# Patient Record
Sex: MALE | Race: WHITE | NOT HISPANIC OR LATINO | Employment: OTHER | URBAN - METROPOLITAN AREA
[De-identification: names, ages, dates, MRNs, and addresses within clinical notes are randomized per-mention and may not be internally consistent; named-entity substitution may affect disease eponyms.]

---

## 2017-11-21 ENCOUNTER — OFFICE VISIT (OUTPATIENT)
Dept: FAMILY MEDICINE CLINIC | Facility: CLINIC | Age: 67
End: 2017-11-21

## 2017-11-21 VITALS
WEIGHT: 201.6 LBS | DIASTOLIC BLOOD PRESSURE: 68 MMHG | HEART RATE: 59 BPM | BODY MASS INDEX: 31.64 KG/M2 | OXYGEN SATURATION: 99 % | SYSTOLIC BLOOD PRESSURE: 110 MMHG | HEIGHT: 67 IN

## 2017-11-21 DIAGNOSIS — Z85.46 HISTORY OF PROSTATE CANCER: ICD-10-CM

## 2017-11-21 DIAGNOSIS — Z80.8 FAMILY HISTORY OF THYROID CANCER: ICD-10-CM

## 2017-11-21 DIAGNOSIS — I49.5 SICK SINUS SYNDROME (HCC): Primary | ICD-10-CM

## 2017-11-21 DIAGNOSIS — E78.00 ELEVATED CHOLESTEROL: ICD-10-CM

## 2017-11-21 DIAGNOSIS — Z95.0 PACEMAKER: ICD-10-CM

## 2017-11-21 DIAGNOSIS — Z90.79 H/O PROSTATECTOMY: ICD-10-CM

## 2017-11-21 DIAGNOSIS — D17.9 MULTIPLE LIPOMAS: ICD-10-CM

## 2017-11-21 PROCEDURE — 99204 OFFICE O/P NEW MOD 45 MIN: CPT | Performed by: FAMILY MEDICINE

## 2017-11-21 NOTE — PROGRESS NOTES
Subjective   Jeb Russo is a 67 y.o. male    HPI Comments: New patient presents to establish care.  He has not had a primary care physician in [facility].  He has a home here and also home in [place] and travels frequently to his home in [place].  He is retired.  Medical history includes sick sinus syndrome with pacemaker and previous prostatectomy for prostate cancer.  He has also had knee surgery for ACL repair.  He would like to have blood work done as a general follow-up.  His cardiologist is Dr. Saini. Complains of multiple nodules under skin, not tender.      The following portions of the patient's history were reviewed and updated as appropriate: allergies, current medications, past social history and problem list    Review of Systems   Constitutional: Negative.    HENT: Negative.    Eyes: Negative.    Respiratory: Negative.    Cardiovascular: Negative.    Gastrointestinal: Negative.    Endocrine: Negative.    Genitourinary: Negative.    Musculoskeletal: Negative.    Skin: Negative.    Allergic/Immunologic: Negative.    Neurological: Negative.    Hematological: Negative.    Psychiatric/Behavioral: Negative.    All other systems reviewed and are negative.      Objective     Vitals:    11/21/17 1301   BP: 110/68   Pulse: 59   SpO2: 99%       Physical Exam   Constitutional: He is oriented to person, place, and time. He appears well-developed and well-nourished.   HENT:   Head: Normocephalic and atraumatic.   Right Ear: External ear normal.   Left Ear: External ear normal.   Nose: Nose normal.   Mouth/Throat: Oropharynx is clear and moist.   Eyes: Conjunctivae and EOM are normal. Pupils are equal, round, and reactive to light.   Neck: Normal range of motion. Neck supple. No thyromegaly present.   Cardiovascular: Normal rate, regular rhythm, normal heart sounds and intact distal pulses.    No murmur heard.  Pulmonary/Chest: Effort normal and breath sounds normal.   Abdominal: Soft. Bowel sounds are  normal. He exhibits no mass. There is no tenderness.   Musculoskeletal: Normal range of motion. He exhibits no edema.   Neurological: He is alert and oriented to person, place, and time. He has normal reflexes. No cranial nerve deficit.   Skin: Skin is warm and dry.   Multiple lipomas on upper extremities and trunk   Psychiatric: He has a normal mood and affect. His behavior is normal.       Assessment/Plan   Problem List Items Addressed This Visit        Cardiovascular and Mediastinum    Sick sinus syndrome - Primary    Relevant Orders    CBC (No Diff)    Comprehensive Metabolic Panel    Pacemaker      Other Visit Diagnoses     History of prostate cancer        Relevant Orders    PSA    H/O prostatectomy        Relevant Orders    PSA    Elevated cholesterol        Relevant Orders    Lipid Panel    Family history of thyroid cancer        Relevant Orders    TSH    T4, Free    Multiple lipomas        Relevant Orders    Ambulatory Referral to General Surgery (Completed)

## 2017-11-22 ENCOUNTER — APPOINTMENT (OUTPATIENT)
Dept: LAB | Facility: HOSPITAL | Age: 67
End: 2017-11-22

## 2017-11-22 LAB
ALBUMIN SERPL-MCNC: 4.1 G/DL (ref 3.2–4.8)
ALBUMIN/GLOB SERPL: 1.6 G/DL (ref 1.5–2.5)
ALP SERPL-CCNC: 65 U/L (ref 25–100)
ALT SERPL W P-5'-P-CCNC: 43 U/L (ref 7–40)
ANION GAP SERPL CALCULATED.3IONS-SCNC: 3 MMOL/L (ref 3–11)
ARTICHOKE IGE QN: 160 MG/DL (ref 0–130)
AST SERPL-CCNC: 32 U/L (ref 0–33)
BILIRUB SERPL-MCNC: 0.7 MG/DL (ref 0.3–1.2)
BUN BLD-MCNC: 18 MG/DL (ref 9–23)
BUN/CREAT SERPL: 18 (ref 7–25)
CALCIUM SPEC-SCNC: 9.6 MG/DL (ref 8.7–10.4)
CHLORIDE SERPL-SCNC: 106 MMOL/L (ref 99–109)
CHOLEST SERPL-MCNC: 206 MG/DL (ref 0–200)
CO2 SERPL-SCNC: 28 MMOL/L (ref 20–31)
CREAT BLD-MCNC: 1 MG/DL (ref 0.6–1.3)
DEPRECATED RDW RBC AUTO: 43.6 FL (ref 37–54)
ERYTHROCYTE [DISTWIDTH] IN BLOOD BY AUTOMATED COUNT: 13.7 % (ref 11.3–14.5)
GFR SERPL CREATININE-BSD FRML MDRD: 74 ML/MIN/1.73
GLOBULIN UR ELPH-MCNC: 2.6 GM/DL
GLUCOSE BLD-MCNC: 104 MG/DL (ref 70–100)
HCT VFR BLD AUTO: 52.9 % (ref 38.9–50.9)
HDLC SERPL-MCNC: 43 MG/DL (ref 40–60)
HGB BLD-MCNC: 17 G/DL (ref 13.1–17.5)
MCH RBC QN AUTO: 28.1 PG (ref 27–31)
MCHC RBC AUTO-ENTMCNC: 32.1 G/DL (ref 32–36)
MCV RBC AUTO: 87.3 FL (ref 80–99)
PLATELET # BLD AUTO: 200 10*3/MM3 (ref 150–450)
PMV BLD AUTO: 10.1 FL (ref 6–12)
POTASSIUM BLD-SCNC: 4.5 MMOL/L (ref 3.5–5.5)
PROT SERPL-MCNC: 6.7 G/DL (ref 5.7–8.2)
PSA SERPL-MCNC: 0.1 NG/ML (ref 0–4)
RBC # BLD AUTO: 6.06 10*6/MM3 (ref 4.2–5.76)
SODIUM BLD-SCNC: 137 MMOL/L (ref 132–146)
T4 FREE SERPL-MCNC: 1.36 NG/DL (ref 0.89–1.76)
TRIGL SERPL-MCNC: 119 MG/DL (ref 0–150)
TSH SERPL DL<=0.05 MIU/L-ACNC: 1.01 MIU/ML (ref 0.35–5.35)
WBC NRBC COR # BLD: 6.18 10*3/MM3 (ref 3.5–10.8)

## 2017-11-22 PROCEDURE — 80053 COMPREHEN METABOLIC PANEL: CPT | Performed by: FAMILY MEDICINE

## 2017-11-22 PROCEDURE — 85027 COMPLETE CBC AUTOMATED: CPT | Performed by: FAMILY MEDICINE

## 2017-11-22 PROCEDURE — 80061 LIPID PANEL: CPT | Performed by: FAMILY MEDICINE

## 2017-11-22 PROCEDURE — 36415 COLL VENOUS BLD VENIPUNCTURE: CPT | Performed by: FAMILY MEDICINE

## 2017-11-22 PROCEDURE — 84443 ASSAY THYROID STIM HORMONE: CPT | Performed by: FAMILY MEDICINE

## 2017-11-22 PROCEDURE — 84439 ASSAY OF FREE THYROXINE: CPT | Performed by: FAMILY MEDICINE

## 2017-11-22 PROCEDURE — 84153 ASSAY OF PSA TOTAL: CPT | Performed by: FAMILY MEDICINE

## 2017-11-29 ENCOUNTER — TELEPHONE (OUTPATIENT)
Dept: FAMILY MEDICINE CLINIC | Facility: CLINIC | Age: 67
End: 2017-11-29

## 2017-11-29 NOTE — TELEPHONE ENCOUNTER
----- Message from Janis Bynum sent at 11/29/2017 10:25 AM EST -----  Contact: PATIENT  He would like for someone to call him with lab results from last week if they are back. Thank you    [phone]

## 2017-11-30 ENCOUNTER — TELEPHONE (OUTPATIENT)
Dept: FAMILY MEDICINE CLINIC | Facility: CLINIC | Age: 67
End: 2017-11-30

## 2017-11-30 NOTE — TELEPHONE ENCOUNTER
----- Message from Chani Seaman sent at 11/30/2017 12:02 PM EST -----  Contact: patient  Patient is wondering about the status of lab results. A good call back number is [phone].

## 2017-12-01 ENCOUNTER — OFFICE VISIT (OUTPATIENT)
Dept: CARDIOLOGY | Facility: CLINIC | Age: 67
End: 2017-12-01

## 2017-12-01 VITALS
SYSTOLIC BLOOD PRESSURE: 110 MMHG | HEART RATE: 78 BPM | BODY MASS INDEX: 30.1 KG/M2 | HEIGHT: 69 IN | WEIGHT: 203.2 LBS | DIASTOLIC BLOOD PRESSURE: 58 MMHG | OXYGEN SATURATION: 98 %

## 2017-12-01 DIAGNOSIS — I49.5 SICK SINUS SYNDROME (HCC): Primary | ICD-10-CM

## 2017-12-01 DIAGNOSIS — Z95.0 PACEMAKER: ICD-10-CM

## 2017-12-01 PROCEDURE — 93280 PM DEVICE PROGR EVAL DUAL: CPT | Performed by: INTERNAL MEDICINE

## 2017-12-01 PROCEDURE — 99213 OFFICE O/P EST LOW 20 MIN: CPT | Performed by: INTERNAL MEDICINE

## 2017-12-01 RX ORDER — VIT C/HESPERIDIN/BIOFLAVONOIDS 500-100 MG
30 TABLET ORAL DAILY
COMMUNITY

## 2017-12-01 NOTE — PATIENT INSTRUCTIONS
"DASH Eating Plan  DASH stands for \"Dietary Approaches to Stop Hypertension.\" The DASH eating plan is a healthy eating plan that has been shown to reduce high blood pressure (hypertension). Additional health benefits may include reducing the risk of type 2 diabetes mellitus, heart disease, and stroke. The DASH eating plan may also help with weight loss.  WHAT DO I NEED TO KNOW ABOUT THE DASH EATING PLAN?  For the DASH eating plan, you will follow these general guidelines:  · Choose foods with less than 150 milligrams of sodium per serving (as listed on the food label).  · Use salt-free seasonings or herbs instead of table salt or sea salt.  · Check with your health care provider or pharmacist before using salt substitutes.  · Eat lower-sodium products. These are often labeled as \"low-sodium\" or \"no salt added.\"  · Eat fresh foods. Avoid eating a lot of canned foods.  · Eat more vegetables, fruits, and low-fat dairy products.  · Choose whole grains. Look for the word \"whole\" as the first word in the ingredient list.  · Choose fish and skinless chicken or turkey more often than red meat. Limit fish, poultry, and meat to 6 oz (170 g) each day.  · Limit sweets, desserts, sugars, and sugary drinks.  · Choose heart-healthy fats.  · Eat more home-cooked food and less restaurant, buffet, and fast food.  · Limit fried foods.  · Do not beaulieu foods. Cook foods using methods such as baking, boiling, grilling, and broiling instead.  · When eating at a restaurant, ask that your food be prepared with less salt, or no salt if possible.  WHAT FOODS CAN I EAT?  Seek help from a dietitian for individual calorie needs.  Grains  Whole grain or whole wheat bread. Brown rice. Whole grain or whole wheat pasta. Quinoa, bulgur, and whole grain cereals. Low-sodium cereals. Corn or whole wheat flour tortillas. Whole grain cornbread. Whole grain crackers. Low-sodium crackers.  Vegetables  Fresh or frozen vegetables (raw, steamed, roasted, or " grilled). Low-sodium or reduced-sodium tomato and vegetable juices. Low-sodium or reduced-sodium tomato sauce and paste. Low-sodium or reduced-sodium canned vegetables.   Fruits  All fresh, canned (in natural juice), or frozen fruits.  Meat and Other Protein Products  Ground beef (85% or leaner), grass-fed beef, or beef trimmed of fat. Skinless chicken or turkey. Ground chicken or turkey. Pork trimmed of fat. All fish and seafood. Eggs. Dried beans, peas, or lentils. Unsalted nuts and seeds. Unsalted canned beans.  Dairy  Low-fat dairy products, such as skim or 1% milk, 2% or reduced-fat cheeses, low-fat ricotta or cottage cheese, or plain low-fat yogurt. Low-sodium or reduced-sodium cheeses.  Fats and Oils  Tub margarines without trans fats. Light or reduced-fat mayonnaise and salad dressings (reduced sodium). Avocado. Safflower, olive, or canola oils. Natural peanut or almond butter.  Other  Unsalted popcorn and pretzels.  The items listed above may not be a complete list of recommended foods or beverages. Contact your dietitian for more options.  WHAT FOODS ARE NOT RECOMMENDED?  Grains  White bread. White pasta. White rice. Refined cornbread. Bagels and croissants. Crackers that contain trans fat.  Vegetables  Creamed or fried vegetables. Vegetables in a cheese sauce. Regular canned vegetables. Regular canned tomato sauce and paste. Regular tomato and vegetable juices.  Fruits  Canned fruit in light or heavy syrup. Fruit juice.  Meat and Other Protein Products  Fatty cuts of meat. Ribs, chicken wings, obrien, sausage, bologna, salami, chitterlings, fatback, hot dogs, bratwurst, and packaged luncheon meats. Salted nuts and seeds. Canned beans with salt.  Dairy  Whole or 2% milk, cream, half-and-half, and cream cheese. Whole-fat or sweetened yogurt. Full-fat cheeses or blue cheese. Nondairy creamers and whipped toppings. Processed cheese, cheese spreads, or cheese curds.  Condiments  Onion and garlic salt, seasoned  salt, table salt, and sea salt. Canned and packaged gravies. Worcestershire sauce. Tartar sauce. Barbecue sauce. Teriyaki sauce. Soy sauce, including reduced sodium. Steak sauce. Fish sauce. Oyster sauce. Cocktail sauce. Horseradish. Ketchup and mustard. Meat flavorings and tenderizers. Bouillon cubes. Hot sauce. Tabasco sauce. Marinades. Taco seasonings. Relishes.  Fats and Oils  Butter, stick margarine, lard, shortening, ghee, and obrien fat. Coconut, palm kernel, or palm oils. Regular salad dressings.  Other  Pickles and olives. Salted popcorn and pretzels.  The items listed above may not be a complete list of foods and beverages to avoid. Contact your dietitian for more information.  WHERE CAN I FIND MORE INFORMATION?  National [employer], [employer] [place]: www.nhlbi.nih.gov/health/health-topics/topics/dash/     This information is not intended to replace advice given to you by your health care provider. Make sure you discuss any questions you have with your health care provider.     Document Released: 12/06/2012 Document Revised: 04/10/2017 Document Reviewed: 10/22/2014  Elsevier Interactive Patient Education ©2017 [employer]

## 2017-12-01 NOTE — PROGRESS NOTES
Encounter Date:12/01/2017      Patient ID: Jeb Russo is a 67 y.o. male.    Chief Complaint: sinus syndrome    PROBLEM LIST:  1. Sick sinus syndrome  a. Episode of syncope 2006.   b. Subsequent extensive cardiac evaluation including negative cardiac catheterization study and Holter monitor testing.  c. [facility] dual-chamber pacemaker implant in 2006  d. PM generator change by Dr. Saini April 2012  2. Dyslipidemia, not on any treatment.  3. Prostate cancer, s/p prostatectomy  4. Obesity, BMI 30  5. Surgical history:   a. Prostatectomy   b. Knee surgery/ ACL repair (remote past)    History of Present Illness  Patient presents today for follow-up with a history of SSS. Has been doing well from a cardiac standpoint. Complains of sudden increase in heart rate when he first starts on the treadmill in the morning but gradually comes back down.  He has detected this on his feet bit and states that this does not occur if he exercises in the afternoon. Has recently arrived from [facility] for his annual visit.  Recently saw her PCP and his labs revealed significant dyslipidemia.  He is due to go back for follow-up. Denies chest pain, shortness of breath, leg swelling, palpitations, and syncope. Remains busy and active with light activity on the treadmil.    No Known Allergies      Current Outpatient Prescriptions:   •  Multiple Vitamins-Minerals (MULTIVITAMIN ADULT PO), Take 15 mg by mouth Daily., Disp: , Rfl:   •  Zinc 30 MG tablet, Take  by mouth Daily., Disp: , Rfl:     The following portions of the patient's history were reviewed and updated as appropriate: allergies, current medications, past family history, past medical history, past social history, past surgical history and problem list.    ROS  Review of Systems   Constitution: Negative for chills, fever, weight gain and weight loss.   Cardiovascular: Negative for chest pain, claudication, dyspnea on exertion, leg swelling, orthopnea, palpitations, paroxysmal  "nocturnal dyspnea and syncope.        No dizziness   Gastrointestinal: Negative for abdominal pain, constipation, diarrhea, nausea and vomiting.   Genitourinary:        No urinary symptoms   Neurological:        No symptoms of stroke.   All other systems reviewed and are negative.    Objective:     Blood pressure 110/58, pulse 78, height 69\" (175.3 cm), weight 203 lb 3.2 oz (92.2 kg), SpO2 98 %.      Physical Exam  Constitutional: She appears well-developed and well-nourished.   HENT:   HEENT exam unremarkable.   Neck: Neck supple. No JVD present.   No carotid bruits.   Cardiovascular: Normal rate, regular rhythm and normal heart sounds.    No murmur heard.  2 plus symmetric pulses.   Pulmonary/Chest: Breath sounds normal. Does not exhibit tenderness.   Abdominal:   Abdomen benign.   Musculoskeletal: Does not exhibit edema.   Neurological:   Neurological exam unremarkable.   Vitals reviewed.    Lab Review:   Procedures       BSC: Normal device check.  0% AMS. No events. 3 years of battery life remaining.      Assessment:      Diagnosis Plan   1. Sick sinus syndrome     2. Pacemaker       Plan:   Start Aspirin 81 mg.  Advised to make healthy diet choices, DASH diet recommended, he was advised to follow up with PCP regarding blood work and potential starting of statin therapy.  Continue all other current medications.   FU in 12 MO with device check, sooner as needed.  Thank you for allowing us to participate in the care of your patient.     Scribed for Valentina Saini MD by Arsalan Rodriguez. 12/1/2017  11:36 AM    IValentina MD, personally performed the services described in this documentation as scribed by the above named individual in my presence, and it is both accurate and complete.  12/1/2017  12:16 PM        Please note that portions of this note may have been completed with a voice recognition program. Efforts were made to edit the dictations, but occasionally words are mistranscribed.      "

## 2019-05-24 ENCOUNTER — OFFICE VISIT (OUTPATIENT)
Dept: FAMILY MEDICINE CLINIC | Facility: CLINIC | Age: 69
End: 2019-05-24

## 2019-05-24 VITALS
OXYGEN SATURATION: 98 % | HEIGHT: 69 IN | WEIGHT: 214 LBS | HEART RATE: 78 BPM | TEMPERATURE: 97.9 F | DIASTOLIC BLOOD PRESSURE: 72 MMHG | BODY MASS INDEX: 31.7 KG/M2 | SYSTOLIC BLOOD PRESSURE: 136 MMHG

## 2019-05-24 DIAGNOSIS — M65.341 TRIGGER RING FINGER OF RIGHT HAND: ICD-10-CM

## 2019-05-24 DIAGNOSIS — Z13.220 SCREENING CHOLESTEROL LEVEL: ICD-10-CM

## 2019-05-24 DIAGNOSIS — Z12.5 ENCOUNTER FOR SCREENING FOR MALIGNANT NEOPLASM OF PROSTATE: ICD-10-CM

## 2019-05-24 DIAGNOSIS — Z13.1 DIABETES MELLITUS SCREENING: ICD-10-CM

## 2019-05-24 DIAGNOSIS — Z00.00 MEDICARE ANNUAL WELLNESS VISIT, INITIAL: Primary | ICD-10-CM

## 2019-05-24 DIAGNOSIS — Z85.46 HISTORY OF PROSTATE CANCER: ICD-10-CM

## 2019-05-24 PROCEDURE — 99213 OFFICE O/P EST LOW 20 MIN: CPT | Performed by: PHYSICIAN ASSISTANT

## 2019-05-24 PROCEDURE — G0438 PPPS, INITIAL VISIT: HCPCS | Performed by: PHYSICIAN ASSISTANT

## 2019-05-24 NOTE — PROGRESS NOTES
Subjective   Jeb Russo is a 68 y.o. male  Annual Exam (Annual Physical ) and Finger Problem (Right ring finger locks up in the morning )      History of Present Illness  Patient comes in today for 2 separate issues one is for an annual wellness visit see separate template.  He is here also for generalized annual labs and screening test.  He spends part of the year living in [place] and part in a row on and has family in a home here in [place] which she visits throughout the year as well.  He is here visiting with his sisters and his mother through the end of this month.  He states he feels very well.  The only complaint and new problem he has is of pain and stiffness in his right ring finger for the past couple of weeks when he wakes up in the morning.  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  Denies any pain into the palm.  The following portions of the patient's history were reviewed and updated as appropriate: allergies, current medications, past social history and problem list    Review of Systems   Constitutional: Positive for activity change.   Respiratory: Negative.    Cardiovascular: Negative.    Musculoskeletal: Positive for arthralgias and myalgias. Negative for gait problem and joint swelling.   Skin: Negative.    Allergic/Immunologic: Negative.    Neurological: Negative.  Negative for weakness and numbness.   Hematological: Negative.    Psychiatric/Behavioral: Negative.        Objective     Vitals:    05/24/19 1411   BP: 136/72   Pulse: 78   Temp: 97.9 °F (36.6 °C)   SpO2: 98%       Physical Exam   Constitutional: He is oriented to person, place, and time. He appears well-developed and well-nourished. No distress.   Neck: No JVD present.   Cardiovascular: Normal rate, regular rhythm, normal heart sounds and intact distal pulses.   No murmur heard.  Pulmonary/Chest: Effort normal and breath sounds normal. No respiratory distress. He  exhibits no tenderness.   Abdominal: Soft. He exhibits no distension. There is no tenderness.   Musculoskeletal: He exhibits tenderness ( Minimal tenderness along the anterior DIP joint of the fourth digit right hand, no nodules palpable, no contractures noted in palm, no edema). He exhibits no edema.   Neurological: He is alert and oriented to person, place, and time. He displays normal reflexes. No cranial nerve deficit. He exhibits normal muscle tone. Coordination normal.   Skin: Skin is warm and dry. No rash noted. He is not diaphoretic. No erythema. No pallor.   Psychiatric: He has a normal mood and affect.   Nursing note and vitals reviewed.      Assessment/Plan     Diagnoses and all orders for this visit:    Medicare annual wellness visit, initial  -     Comprehensive metabolic panel; Future  -     TSH; Future  -     CBC & Differential; Future    History of prostate cancer  -     PSA Screen; Future    Diabetes mellitus screening  -     Comprehensive metabolic panel; Future    Screening cholesterol level  -     Lipid Panel    Encounter for screening for malignant neoplasm of prostate   -     PSA Screen; Future    Trigger ring finger of right hand    Other orders  -     diclofenac (VOLTAREN) 1 % gel gel; Apply 4 g topically to the appropriate area as directed 2 (Two) Times a Day.    I will send letter to patient regarding lab results after I review them.  We discussed recommendations for immunizations for his age group, he declines.

## 2019-05-24 NOTE — PROGRESS NOTES
QUICK REFERENCE INFORMATION:  The ABCs of the Annual Wellness Visit    Initial Medicare Wellness Visit    HEALTH RISK ASSESSMENT    : 1950    Recent Hospitalizations:  No hospitalization(s) within the last year..  ccc      Current Medical Providers:  Patient Care Team:  Jeff Phan MD as PCP - General (Family Medicine)  Valentina Saini MD as PCP - Claims Attributed        Smoking Status:  Social History     Tobacco Use   Smoking Status Former Smoker   • Types: Cigarettes   • Last attempt to quit:    • Years since quittin.3   Smokeless Tobacco Never Used       Alcohol Consumption:  Social History     Substance and Sexual Activity   Alcohol Use Yes   • Alcohol/week: 0.6 oz   • Types: 1 Glasses of wine per week    Comment: weekly       Depression Screen:   No flowsheet data found.    Health Habits and Functional and Cognitive Screening:  No flowsheet data found.        Does the patient have evidence of cognitive impairment? No    Asiprin use counseling: Does not need ASA (and currently is not on it)      Recent Lab Results:          Lab Results   Component Value Date    CHOL 206 (H) 2017    TRIG 119 2017    HDL 43 2017           Age-appropriate Screening Schedule:  Refer to the list below for future screening recommendations based on patient's age, sex and/or medical conditions. Orders for these recommended tests are listed in the plan section. The patient has been provided with a written plan.    Health Maintenance   Topic Date Due   • PNEUMOCOCCAL VACCINES (65+ LOW/MEDIUM RISK) (1 of 2 - PCV13) 2014   • COLONOSCOPY  2017   • LIPID PANEL  2018   • ZOSTER VACCINE (1 of 2) 2020 (Originally 8/3/1999)   • TDAP/TD VACCINES (1 - Tdap) 2025 (Originally [birth year]/1968)   • INFLUENZA VACCINE  2019        Subjective   History of Present Illness    Jeb Russo is a 68 y.o. male who presents for an Annual Wellness Visit.    The following portions  "of the patient's history were reviewed and updated as appropriate: allergies, past family history, past medical history, past social history, past surgical history and problem list.    Outpatient Medications Prior to Visit   Medication Sig Dispense Refill   • Multiple Vitamins-Minerals (MULTIVITAMIN ADULT PO) Take 15 mg by mouth Daily.     • Zinc 30 MG tablet Take  by mouth Daily.       No facility-administered medications prior to visit.        Patient Active Problem List   Diagnosis   • Sick sinus syndrome (CMS/HCC)   • Pacemaker       Advance Care Planning:  Patient has an advance directive - a copy has not been provided. Have asked the patient to send this to us to add to record    Identification of Risk Factors:  Risk factors include: cardiovascular risk.    Review of Systems    Compared to one year ago, the patient feels his physical health is the same.  Compared to one year ago, the patient feels his mental health is the same.    Objective     Physical Exam    Vitals:    05/24/19 1411   BP: 136/72   Pulse: 78   Temp: 97.9 °F (36.6 °C)   TempSrc: Oral   SpO2: 98%   Weight: 97.1 kg (214 lb)   Height: 174 cm (68.5\")       Patient's Body mass index is 32.07 kg/m². BMI is above normal parameters. Recommendations include: educational material.      Assessment/Plan   Patient Self-Management and Personalized Health Advice  The patient has been provided with information about: exercise and preventive services including:   · Exercise counseling provided.    Visit Diagnoses:  No diagnosis found.    No orders of the defined types were placed in this encounter.      Outpatient Encounter Medications as of 5/24/2019   Medication Sig Dispense Refill   • Multiple Vitamins-Minerals (MULTIVITAMIN ADULT PO) Take 15 mg by mouth Daily.     • Zinc 30 MG tablet Take  by mouth Daily.       No facility-administered encounter medications on file as of 5/24/2019.        Reviewed use of high risk medication in the elderly: not " applicable  Reviewed for potential of harmful drug interactions in the elderly: not applicable    Follow Up:  No Follow-up on file.     An After Visit Summary and PPPS with all of these plans were given to the patient.

## 2019-05-29 ENCOUNTER — LAB (OUTPATIENT)
Dept: LAB | Facility: HOSPITAL | Age: 69
End: 2019-05-29

## 2019-05-29 DIAGNOSIS — Z13.1 DIABETES MELLITUS SCREENING: ICD-10-CM

## 2019-05-29 DIAGNOSIS — Z12.5 ENCOUNTER FOR SCREENING FOR MALIGNANT NEOPLASM OF PROSTATE: ICD-10-CM

## 2019-05-29 DIAGNOSIS — Z85.46 HISTORY OF PROSTATE CANCER: ICD-10-CM

## 2019-05-29 DIAGNOSIS — Z00.00 MEDICARE ANNUAL WELLNESS VISIT, INITIAL: ICD-10-CM

## 2019-05-29 LAB
ALBUMIN SERPL-MCNC: 4.2 G/DL (ref 3.5–5.2)
ALBUMIN/GLOB SERPL: 1.6 G/DL
ALP SERPL-CCNC: 49 U/L (ref 39–117)
ALT SERPL W P-5'-P-CCNC: 25 U/L (ref 1–41)
ANION GAP SERPL CALCULATED.3IONS-SCNC: 13 MMOL/L
AST SERPL-CCNC: 27 U/L (ref 1–40)
BASOPHILS # BLD AUTO: 0.07 10*3/MM3 (ref 0–0.2)
BASOPHILS NFR BLD AUTO: 1 % (ref 0–1.5)
BILIRUB SERPL-MCNC: 0.8 MG/DL (ref 0.2–1.2)
BUN BLD-MCNC: 18 MG/DL (ref 8–23)
BUN/CREAT SERPL: 15.3 (ref 7–25)
CALCIUM SPEC-SCNC: 9.5 MG/DL (ref 8.6–10.5)
CHLORIDE SERPL-SCNC: 102 MMOL/L (ref 98–107)
CHOLEST SERPL-MCNC: 233 MG/DL (ref 0–200)
CO2 SERPL-SCNC: 26 MMOL/L (ref 22–29)
CREAT BLD-MCNC: 1.18 MG/DL (ref 0.76–1.27)
DEPRECATED RDW RBC AUTO: 45.1 FL (ref 37–54)
EOSINOPHIL # BLD AUTO: 0.3 10*3/MM3 (ref 0–0.4)
EOSINOPHIL NFR BLD AUTO: 4.2 % (ref 0.3–6.2)
ERYTHROCYTE [DISTWIDTH] IN BLOOD BY AUTOMATED COUNT: 14.2 % (ref 12.3–15.4)
GFR SERPL CREATININE-BSD FRML MDRD: 61 ML/MIN/1.73
GLOBULIN UR ELPH-MCNC: 2.6 GM/DL
GLUCOSE BLD-MCNC: 112 MG/DL (ref 65–99)
HCT VFR BLD AUTO: 53.5 % (ref 37.5–51)
HDLC SERPL-MCNC: 42 MG/DL (ref 40–60)
HGB BLD-MCNC: 16.7 G/DL (ref 13–17.7)
IMM GRANULOCYTES # BLD AUTO: 0.03 10*3/MM3 (ref 0–0.05)
IMM GRANULOCYTES NFR BLD AUTO: 0.4 % (ref 0–0.5)
LDLC SERPL CALC-MCNC: 164 MG/DL (ref 0–100)
LDLC/HDLC SERPL: 3.9 {RATIO}
LYMPHOCYTES # BLD AUTO: 2.53 10*3/MM3 (ref 0.7–3.1)
LYMPHOCYTES NFR BLD AUTO: 35.5 % (ref 19.6–45.3)
MCH RBC QN AUTO: 27.4 PG (ref 26.6–33)
MCHC RBC AUTO-ENTMCNC: 31.2 G/DL (ref 31.5–35.7)
MCV RBC AUTO: 87.8 FL (ref 79–97)
MONOCYTES # BLD AUTO: 0.57 10*3/MM3 (ref 0.1–0.9)
MONOCYTES NFR BLD AUTO: 8 % (ref 5–12)
NEUTROPHILS # BLD AUTO: 3.63 10*3/MM3 (ref 1.7–7)
NEUTROPHILS NFR BLD AUTO: 50.9 % (ref 42.7–76)
NRBC BLD AUTO-RTO: 0 /100 WBC (ref 0–0.2)
PLATELET # BLD AUTO: 249 10*3/MM3 (ref 140–450)
PMV BLD AUTO: 10.7 FL (ref 6–12)
POTASSIUM BLD-SCNC: 4.7 MMOL/L (ref 3.5–5.2)
PROT SERPL-MCNC: 6.8 G/DL (ref 6–8.5)
PSA SERPL-MCNC: 0.22 NG/ML (ref 0–4)
RBC # BLD AUTO: 6.09 10*6/MM3 (ref 4.14–5.8)
SODIUM BLD-SCNC: 141 MMOL/L (ref 136–145)
TRIGL SERPL-MCNC: 136 MG/DL (ref 0–150)
TSH SERPL DL<=0.05 MIU/L-ACNC: 2.76 MIU/ML (ref 0.27–4.2)
VLDLC SERPL-MCNC: 27.2 MG/DL (ref 5–40)
WBC NRBC COR # BLD: 7.13 10*3/MM3 (ref 3.4–10.8)

## 2019-05-29 PROCEDURE — 85025 COMPLETE CBC W/AUTO DIFF WBC: CPT

## 2019-05-29 PROCEDURE — G0103 PSA SCREENING: HCPCS

## 2019-05-29 PROCEDURE — 84443 ASSAY THYROID STIM HORMONE: CPT

## 2019-05-29 PROCEDURE — 80061 LIPID PANEL: CPT | Performed by: PHYSICIAN ASSISTANT

## 2019-05-29 PROCEDURE — 80053 COMPREHEN METABOLIC PANEL: CPT

## 2019-05-29 PROCEDURE — 36415 COLL VENOUS BLD VENIPUNCTURE: CPT

## 2019-06-07 ENCOUNTER — OFFICE VISIT (OUTPATIENT)
Dept: CARDIOLOGY | Facility: CLINIC | Age: 69
End: 2019-06-07

## 2019-06-07 VITALS
WEIGHT: 213 LBS | BODY MASS INDEX: 32.28 KG/M2 | DIASTOLIC BLOOD PRESSURE: 64 MMHG | SYSTOLIC BLOOD PRESSURE: 102 MMHG | HEIGHT: 68 IN | HEART RATE: 76 BPM

## 2019-06-07 DIAGNOSIS — I49.5 SICK SINUS SYNDROME (HCC): Primary | ICD-10-CM

## 2019-06-07 DIAGNOSIS — Z95.0 PACEMAKER: ICD-10-CM

## 2019-06-07 DIAGNOSIS — E78.5 HYPERLIPIDEMIA LDL GOAL <100: ICD-10-CM

## 2019-06-07 PROBLEM — E78.00 HYPERCHOLESTEROLEMIA: Status: ACTIVE | Noted: 2019-06-07

## 2019-06-07 PROCEDURE — 93288 INTERROG EVL PM/LDLS PM IP: CPT | Performed by: INTERNAL MEDICINE

## 2019-06-07 PROCEDURE — 99214 OFFICE O/P EST MOD 30 MIN: CPT | Performed by: INTERNAL MEDICINE

## 2019-06-07 RX ORDER — ROSUVASTATIN CALCIUM 10 MG/1
10 TABLET, COATED ORAL DAILY
Qty: 90 TABLET | Refills: 3 | Status: SHIPPED | OUTPATIENT
Start: 2019-06-07 | End: 2021-03-31

## 2019-06-07 NOTE — PROGRESS NOTES
Encounter Date:06/07/2019      Patient ID: Jeb Russo is a 68 y.o. male.    PCP: Jeff Phan MD     Chief Complaint: Sick sinus syndrome      PROBLEM LIST:  1. Sick Sinus Rhythm  a. Episode of Syncope 2006  b. Subsequent extensive cardiac evaluation including negative cardiac catheterization study.   c. [facility] dual-chamber pacemaker implant in 2006  d. PM generator change by Dr. Saini April 2012  2. Dyslipidemia, not on any treatment.  3. Prostate cancer, s/p prostatectomy  4. Obesity, BMI 30  5. Surgical history:   a. Prostatectomy   b. Knee surgery/ ACL repair (remote past)    History of Present Illness  Patient presents today for 1 year follow-up with a history of sick sinus rhythm, dyslipidemia, and obesity. Since last visit, he has been doing well from a cardiovascular standpoint. He denies any chest pain or cardiac issues. He has not been hospitalized or any changes of therapy since his last visit. He typically stays in the [place] for 1-3 months, as he is currently residing in [facility].  As for regular hikes and takes his dog for a walk without any limitations.    At today's visit, he denies chest pain, shortness of breath, leg swelling, palpitations, and syncope. He denies a history of smoking. He has been socially drinking.     No Known Allergies      Current Outpatient Medications:   •  Multiple Vitamins-Minerals (MULTIVITAMIN ADULT PO), Take 15 mg by mouth Daily., Disp: , Rfl:   •  Zinc 30 MG tablet, Take  by mouth Daily., Disp: , Rfl:     The following portions of the patient's history were reviewed and updated as appropriate: allergies, current medications, past family history, past medical history, past social history, past surgical history and problem list.    ROS  Review of Systems   Constitution: Negative for chills, fatigue, fever, generalized weakness, weight gain and weight loss.   Cardiovascular: Negative for chest pain, claudication, dyspnea on exertion, leg  "swelling, orthopnea, palpitations, paroxysmal nocturnal dyspnea and syncope.   Respiratory: Negative for cough, shortness of breath, snoring, and wheezing.  HENT: Negative for ear pain, nosebleeds, and tinnitus.  Gastrointestinal: Negative for abdominal pain, constipation, diarrhea, nausea and vomiting.   Genitourinary: No urinary symptoms   Neurological: Negative for dizziness, headaches, loss of balance, numbness, and symptoms of stroke.  Psychiatric: Normal mental status.     All other systems reviewed and are negative.    Objective:     /64 (BP Location: Left arm, Patient Position: Sitting)   Pulse 76   Ht 172.7 cm (68\")   Wt 96.6 kg (213 lb)   BMI 32.39 kg/m²        Physical Exam  Constitutional: Patient appears well-developed and well-nourished.   HENT: HEENT exam unremarkable.   Neck: Neck supple. No JVD present. No carotid bruits.   Cardiovascular: Normal rate, regular rhythm and normal heart sounds. No murmur heard.   2+ symmetric pulses.   Pulmonary/Chest: Breath sounds normal. Does not exhibit tenderness.   Abdominal: Abdomen benign.   Musculoskeletal: Does not exhibit edema.   Neurological: Neurological exam unremarkable.   Vitals reviewed.    Lab Review:   Lab Results   Component Value Date    GLUCOSE 112 (H) 05/29/2019    BUN 18 05/29/2019    CREATININE 1.18 05/29/2019    EGFRIFNONA 61 05/29/2019    BCR 15.3 05/29/2019    K 4.7 05/29/2019    CO2 26.0 05/29/2019    CALCIUM 9.5 05/29/2019    ALBUMIN 4.20 05/29/2019    AST 27 05/29/2019    ALT 25 05/29/2019     Lab Results   Component Value Date    CHOL 233 (H) 05/29/2019    TRIG 136 05/29/2019    HDL 42 05/29/2019     (H) 05/29/2019      Lab Results   Component Value Date    WBC 7.13 05/29/2019    HGB 16.7 05/29/2019    HCT 53.5 (H) 05/29/2019    MCV 87.8 05/29/2019     05/29/2019     Lab Results   Component Value Date    TSH 2.760 05/29/2019     No results found for: HGBA1C    DEVICE INTERROGATION:  6/7/2019, [employer]SC dual-chamber " pacemaker: Normal function, no significant mode switches are less than 1%. 2 year battery life.     Procedures       Assessment:      Diagnosis Plan   1. Sick sinus syndrome (CMS/HCC)  Pacemaker examined at today's visit, which shows normal mode switches and patient is asymptomatic. Battery life is 2 years. Continue present medical therapy.    2. Pacemaker  Pacemaker examined at today's visit, which shows normal function without significant mode switches and patient is asymptomatic. Battery life is 2 years. Continue present medical therapy.    3. Hyperlipidemia LDL goal < 100 Recommended diet change to minimize his cholesterol. Prescribed Crestor 10 mg daily for cholesterol management.      Plan:   Stable cardiac status.  Continue current medications.   Begin Aspirin 81 mg daily and Crestor 10 mg daily.  Recommended diet change, including low fat and increase vegetable consumption.   Advised to follow-up with his physician in [facility] in 3 months to recheck his cholesterol profile and consider need for adjustment of therapy.    FU in 12 MO, sooner as needed, with device check.   Thank you for allowing us to participate in the care of your patient.     Scribed for Valentina Saini MD by Tammie Merritt. 6/7/2019  11:50 AM      I, Valentina Saini MD, personally performed the services described in this documentation as scribed by the above named individual in my presence, and it is both accurate and complete.  6/7/2019  12:06 PM        Please note that portions of this note may have been completed with a voice recognition program. Efforts were made to edit the dictations, but occasionally words are mistranscribed.

## 2019-06-07 NOTE — PATIENT INSTRUCTIONS
Cholesterol  Cholesterol is a white, waxy, fat-like substance that is needed by the human body in small amounts. The liver makes all the cholesterol we need. Cholesterol is carried from the liver by the blood through the blood vessels. Deposits of cholesterol (plaques) may build up on blood vessel (artery) walls. Plaques make the arteries narrower and stiffer. Cholesterol plaques increase the risk for heart attack and stroke.  You cannot feel your cholesterol level even if it is very high. The only way to know that it is high is to have a blood test. Once you know your cholesterol levels, you should keep a record of the test results. Work with your health care provider to keep your levels in the desired range.  What do the results mean?  · Total cholesterol is a rough measure of all the cholesterol in your blood.  · LDL (low-density lipoprotein) is the “bad” cholesterol. This is the type that causes plaque to build up on the artery walls. You want this level to be low.  · HDL (high-density lipoprotein) is the “good” cholesterol because it cleans the arteries and carries the LDL away. You want this level to be high.  · Triglycerides are fat that the body can either burn for energy or store. High levels are closely linked to heart disease.  What are the desired levels of cholesterol?  · Total cholesterol below 200.  · LDL below 100 for people who are at risk, below 70 for people at very high risk.  · HDL above 40 is good. A level of 60 or higher is considered to be protective against heart disease.  · Triglycerides below 150.  How can I lower my cholesterol?  Diet  Follow your diet program as told by your health care provider.  · Choose fish or white meat chicken and turkey, roasted or baked. Limit fatty cuts of red meat, fried foods, and processed meats, such as sausage and lunch meats.  · Eat lots of fresh fruits and vegetables.  · Choose whole grains, beans, pasta, potatoes, and cereals.  · Choose olive oil, corn  oil, or canola oil, and use only small amounts.  · Avoid butter, mayonnaise, shortening, or palm kernel oils.  · Avoid foods with trans fats.  · Drink skim or nonfat milk and eat low-fat or nonfat yogurt and cheeses. Avoid whole milk, cream, ice cream, egg yolks, and full-fat cheeses.  · Healthier desserts include quinn food cake, nury snaps, animal crackers, hard candy, popsicles, and low-fat or nonfat frozen yogurt. Avoid pastries, cakes, pies, and cookies.    Exercise  · Follow your exercise program as told by your health care provider. A regular program:  ? Helps to decrease LDL and raise HDL.  ? Helps with weight control.  · Do things that increase your activity level, such as gardening, walking, and taking the stairs.  · Ask your health care provider about ways that you can be more active in your daily life.    Medicine  · Take over-the-counter and prescription medicines only as told by your health care provider.  ? Medicine may be prescribed by your health care provider to help lower cholesterol and decrease the risk for heart disease. This is usually done if diet and exercise have failed to bring down cholesterol levels.  ? If you have several risk factors, you may need medicine even if your levels are normal.    This information is not intended to replace advice given to you by your health care provider. Make sure you discuss any questions you have with your health care provider.  Document Released: 09/12/2002 Document Revised: 07/15/2017 Document Reviewed: 06/17/2017  [employer] Patient Education © 2019 [employer]

## 2020-08-07 ENCOUNTER — TELEPHONE (OUTPATIENT)
Dept: CARDIOLOGY | Facility: CLINIC | Age: 70
End: 2020-08-07

## 2020-08-07 NOTE — TELEPHONE ENCOUNTER
Pt sister calling stating pt is stuck in [place] d/t COVID. [place], sister to be mailing this to our office for AA review. Would like to know when AA recommends him to return to [place] for battery change. Will contact sister at [phone] after AA reviews

## 2020-08-13 NOTE — TELEPHONE ENCOUNTER
Hand delivered pt sister email to AA. He called her personally and recommended device recheck in 6 months. Daughter to follow up at that time and further recommendations to be determined then.

## 2021-03-22 ENCOUNTER — TELEPHONE (OUTPATIENT)
Dept: FAMILY MEDICINE CLINIC | Facility: CLINIC | Age: 71
End: 2021-03-22

## 2021-03-22 NOTE — TELEPHONE ENCOUNTER
Caller: Italo Angel    Relationship to patient: Emergency Contact    Best call back number: [phone]  WILL BE IN TOWN FOR A SHORT TIME    Type of visit: MEDICARE WELLNESS    Requested date: 03/29    If rescheduling, when is the original appointment:     Additional notes:

## 2021-03-24 ENCOUNTER — TELEPHONE (OUTPATIENT)
Dept: FAMILY MEDICINE CLINIC | Facility: CLINIC | Age: 71
End: 2021-03-24

## 2021-03-24 NOTE — TELEPHONE ENCOUNTER
I do not entirely remember the situation.  I believe I have seen him before and if he is need an appointment with me on the same day that she is coming in that sounds fine to me as long as I have time slot available.  If I do not please let me know.

## 2021-03-31 ENCOUNTER — OFFICE VISIT (OUTPATIENT)
Dept: FAMILY MEDICINE CLINIC | Facility: CLINIC | Age: 71
End: 2021-03-31

## 2021-03-31 ENCOUNTER — LAB (OUTPATIENT)
Dept: LAB | Facility: HOSPITAL | Age: 71
End: 2021-03-31

## 2021-03-31 VITALS
HEIGHT: 68 IN | WEIGHT: 216 LBS | SYSTOLIC BLOOD PRESSURE: 140 MMHG | OXYGEN SATURATION: 99 % | DIASTOLIC BLOOD PRESSURE: 72 MMHG | BODY MASS INDEX: 32.74 KG/M2 | TEMPERATURE: 97.1 F | HEART RATE: 60 BPM

## 2021-03-31 DIAGNOSIS — Z12.5 PROSTATE CANCER SCREENING: ICD-10-CM

## 2021-03-31 DIAGNOSIS — R73.03 PREDIABETES: ICD-10-CM

## 2021-03-31 DIAGNOSIS — E78.2 MIXED HYPERLIPIDEMIA: ICD-10-CM

## 2021-03-31 DIAGNOSIS — Z00.00 MEDICARE ANNUAL WELLNESS VISIT, SUBSEQUENT: Primary | ICD-10-CM

## 2021-03-31 DIAGNOSIS — Z45.010 ENCOUNTER FOR PACEMAKER AT END OF BATTERY LIFE: ICD-10-CM

## 2021-03-31 PROBLEM — Z85.46 HISTORY OF PROSTATE CANCER: Status: ACTIVE | Noted: 2021-03-31

## 2021-03-31 LAB
ALBUMIN SERPL-MCNC: 4.1 G/DL (ref 3.5–5.2)
ALBUMIN/GLOB SERPL: 1.6 G/DL
ALP SERPL-CCNC: 60 U/L (ref 39–117)
ALT SERPL W P-5'-P-CCNC: 33 U/L (ref 1–41)
ANION GAP SERPL CALCULATED.3IONS-SCNC: 6.7 MMOL/L (ref 5–15)
AST SERPL-CCNC: 24 U/L (ref 1–40)
BILIRUB SERPL-MCNC: 0.6 MG/DL (ref 0–1.2)
BUN SERPL-MCNC: 13 MG/DL (ref 8–23)
BUN/CREAT SERPL: 14.3 (ref 7–25)
CALCIUM SPEC-SCNC: 9.6 MG/DL (ref 8.6–10.5)
CHLORIDE SERPL-SCNC: 102 MMOL/L (ref 98–107)
CHOLEST SERPL-MCNC: 208 MG/DL (ref 0–200)
CO2 SERPL-SCNC: 28.3 MMOL/L (ref 22–29)
CREAT SERPL-MCNC: 0.91 MG/DL (ref 0.76–1.27)
DEPRECATED RDW RBC AUTO: 40 FL (ref 37–54)
ERYTHROCYTE [DISTWIDTH] IN BLOOD BY AUTOMATED COUNT: 12.8 % (ref 12.3–15.4)
GFR SERPL CREATININE-BSD FRML MDRD: 82 ML/MIN/1.73
GLOBULIN UR ELPH-MCNC: 2.6 GM/DL
GLUCOSE SERPL-MCNC: 108 MG/DL (ref 65–99)
HBA1C MFR BLD: 6.31 % (ref 4.8–5.6)
HCT VFR BLD AUTO: 53.3 % (ref 37.5–51)
HDLC SERPL-MCNC: 41 MG/DL (ref 40–60)
HGB BLD-MCNC: 17.7 G/DL (ref 13–17.7)
LDLC SERPL CALC-MCNC: 139 MG/DL (ref 0–100)
LDLC/HDLC SERPL: 3.33 {RATIO}
MCH RBC QN AUTO: 28.8 PG (ref 26.6–33)
MCHC RBC AUTO-ENTMCNC: 33.2 G/DL (ref 31.5–35.7)
MCV RBC AUTO: 86.7 FL (ref 79–97)
PLATELET # BLD AUTO: 215 10*3/MM3 (ref 140–450)
PMV BLD AUTO: 10.8 FL (ref 6–12)
POTASSIUM SERPL-SCNC: 4.6 MMOL/L (ref 3.5–5.2)
PROT SERPL-MCNC: 6.7 G/DL (ref 6–8.5)
PSA SERPL-MCNC: 1.96 NG/ML (ref 0–4)
RBC # BLD AUTO: 6.15 10*6/MM3 (ref 4.14–5.8)
SODIUM SERPL-SCNC: 137 MMOL/L (ref 136–145)
TRIGL SERPL-MCNC: 153 MG/DL (ref 0–150)
VLDLC SERPL-MCNC: 28 MG/DL (ref 5–40)
WBC # BLD AUTO: 6.37 10*3/MM3 (ref 3.4–10.8)

## 2021-03-31 PROCEDURE — G0439 PPPS, SUBSEQ VISIT: HCPCS | Performed by: PHYSICIAN ASSISTANT

## 2021-03-31 PROCEDURE — 83735 ASSAY OF MAGNESIUM: CPT

## 2021-03-31 PROCEDURE — 80053 COMPREHEN METABOLIC PANEL: CPT

## 2021-03-31 PROCEDURE — 36415 COLL VENOUS BLD VENIPUNCTURE: CPT

## 2021-03-31 PROCEDURE — 83036 HEMOGLOBIN GLYCOSYLATED A1C: CPT

## 2021-03-31 PROCEDURE — 80061 LIPID PANEL: CPT

## 2021-03-31 PROCEDURE — 85027 COMPLETE CBC AUTOMATED: CPT

## 2021-03-31 PROCEDURE — G0103 PSA SCREENING: HCPCS

## 2021-03-31 NOTE — PROGRESS NOTES
The ABCs of the Annual Wellness Visit  Subsequent Medicare Wellness Visit    Chief Complaint   Patient presents with   • Medicare Wellness-subsequent     Medicare Wellness with possible labs        Subjective   History of Present Illness:  Jeb Russo is a 70 y.o. male who presents for a Subsequent Medicare Wellness Visit.  Patient lives in [place] most of the year, his sister and mother live here in town and are patients of mine, he comes in once a year to see his cardiologist and have his annual visit with me, has been doing well denies any problems has received his first Covid vaccine.  Continues to stay active.    HEALTH RISK ASSESSMENT    Recent Hospitalizations:  No hospitalization(s) within the last year.    Current Medical Providers:  Patient Care Team:  Jeff Phan MD as PCP - General (Family Medicine)    Smoking Status:  Social History     Tobacco Use   Smoking Status Former Smoker   • Types: Cigarettes   • Quit date:    • Years since quittin.2   Smokeless Tobacco Never Used       Alcohol Consumption:  Social History     Substance and Sexual Activity   Alcohol Use Yes   • Alcohol/week: 1.0 standard drinks   • Types: 1 Glasses of wine per week    Comment: weekly       Depression Screen:   No flowsheet data found.    Fall Risk Screen:  STEADI Fall Risk Assessment was completed, and patient is at LOW risk for falls.Assessment completed on:3/31/2021    Health Habits and Functional and Cognitive Screening:  Functional & Cognitive Status 3/31/2021   Do you have difficulty preparing food and eating? No   Do you have difficulty bathing yourself, getting dressed or grooming yourself? No   Do you have difficulty using the toilet? No   Do you have difficulty moving around from place to place? No   Do you have trouble with steps or getting out of a bed or a chair? No   Current Diet Well Balanced Diet   Dental Exam Up to date   Eye Exam Up to date   Exercise (times per week) 0 times per week    Current Exercise Activities Include None   Do you need help using the phone?  No   Are you deaf or do you have serious difficulty hearing?  No   Do you need help with transportation? No   Do you need help shopping? No   Do you need help preparing meals?  No   Do you need help with housework?  No   Do you need help with laundry? No   Do you need help taking your medications? No   Do you need help managing money? No   Do you ever drive or ride in a car without wearing a seat belt? No   Have you felt unusual stress, anger or loneliness in the last month? No   Who do you live with? Alone   If you need help, do you have trouble finding someone available to you? No   Have you been bothered in the last four weeks by sexual problems? No         Does the patient have evidence of cognitive impairment? No    Asprin use counseling:Does not need ASA (and currently is not on it)    Age-appropriate Screening Schedule:  Refer to the list below for future screening recommendations based on patient's age, sex and/or medical conditions. Orders for these recommended tests are listed in the plan section. The patient has been provided with a written plan.    Health Maintenance   Topic Date Due   • ZOSTER VACCINE (1 of 2) Never done   • LIPID PANEL  05/29/2020   • INFLUENZA VACCINE  03/31/2022 (Originally 8/1/2020)   • TDAP/TD VACCINES (1 - Tdap) 05/22/2025 (Originally 8/3/1969)   • COLONOSCOPY  02/01/2028          The following portions of the patient's history were reviewed and updated as appropriate: allergies, current medications, past family history, past social history, past surgical history and problem list.    Outpatient Medications Prior to Visit   Medication Sig Dispense Refill   • Multiple Vitamins-Minerals (MULTIVITAMIN ADULT PO) Take 15 mg by mouth Daily.     • Zinc 30 MG tablet Take  by mouth Daily.     • rosuvastatin (CRESTOR) 10 MG tablet Take 1 tablet by mouth Daily. 90 tablet 3     No facility-administered medications  "prior to visit.       Patient Active Problem List   Diagnosis   • Sick sinus syndrome (CMS/HCC)   • Pacemaker   • Hyperlipidemia LDL goal <100   • Hypercholesterolemia   • History of prostate cancer       Advanced Care Planning:  ACP discussion was held with the patient during this visit. Patient does not have an advance directive, information provided.    Review of Systems   Constitutional: Negative for fatigue and unexpected weight change.   Respiratory: Negative for cough, chest tightness and shortness of breath.    Cardiovascular: Negative for chest pain, palpitations and leg swelling.   Gastrointestinal: Negative for nausea.   Skin: Negative for color change and rash.   Neurological: Negative for dizziness, syncope, weakness and headaches.   Psychiatric/Behavioral: Negative.        Compared to one year ago, the patient feels his physical health is the same.  Compared to one year ago, the patient feels his mental health is the same.    Reviewed chart for potential of high risk medication in the elderly: yes  Reviewed chart for potential of harmful drug interactions in the elderly:not applicable    Objective         Vitals:    03/31/21 0930   BP: 140/72   Pulse: 60   Temp: 97.1 °F (36.2 °C)   SpO2: 99%   Weight: 98 kg (216 lb)   Height: 171.5 cm (67.5\")   PainSc: 0-No pain       Body mass index is 33.33 kg/m².  Discussed the patient's BMI with him. The BMI is above average; BMI management plan is completed.    Physical Exam  Vitals and nursing note reviewed.   Constitutional:       General: He is not in acute distress.     Appearance: Normal appearance. He is well-developed. He is not ill-appearing, toxic-appearing or diaphoretic.   HENT:      Head: Normocephalic and atraumatic.      Right Ear: External ear normal.      Left Ear: External ear normal.      Nose: Nose normal.   Eyes:      Conjunctiva/sclera: Conjunctivae normal.      Pupils: Pupils are equal, round, and reactive to light.   Neck:      Thyroid: No " thyromegaly.      Vascular: No JVD.   Cardiovascular:      Rate and Rhythm: Normal rate and regular rhythm.      Heart sounds: Normal heart sounds. No murmur heard.     Pulmonary:      Effort: Pulmonary effort is normal. No respiratory distress.      Breath sounds: Normal breath sounds.   Chest:      Chest wall: No tenderness.   Abdominal:      General: Bowel sounds are normal. There is no distension.      Palpations: Abdomen is soft. There is no mass.      Tenderness: There is no abdominal tenderness.   Musculoskeletal:         General: Normal range of motion.      Cervical back: Normal range of motion and neck supple.   Skin:     General: Skin is warm and dry.      Coloration: Skin is not pale.      Findings: No erythema.   Neurological:      Mental Status: He is alert and oriented to person, place, and time.      Cranial Nerves: No cranial nerve deficit.      Coordination: Coordination normal.      Deep Tendon Reflexes: Reflexes are normal and symmetric.   Psychiatric:         Mood and Affect: Mood normal.         Behavior: Behavior normal.         Thought Content: Thought content normal.         Judgment: Judgment normal.               Assessment/Plan   Medicare Risks and Personalized Health Plan  CMS Preventative Services Quick Reference  Immunizations Discussed/Encouraged (specific immunizations; Shingrix )    The above risks/problems have been discussed with the patient.  Pertinent information has been shared with the patient in the After Visit Summary.  Follow up plans and orders are seen below in the Assessment/Plan Section.    Diagnoses and all orders for this visit:    1. Medicare annual wellness visit, subsequent (Primary)    2. Mixed hyperlipidemia  -     Lipid Panel; Future  -     CBC (No Diff); Future    3. Prediabetes  -     Comprehensive metabolic panel; Future  -     CBC (No Diff); Future  -     Hemoglobin A1c; Future    4. Prostate cancer screening  -     PSA Screen; Future      Follow Up:  No  follow-ups on file.     An After Visit Summary and PPPS were given to the patient.

## 2021-04-02 ENCOUNTER — APPOINTMENT (OUTPATIENT)
Dept: PREADMISSION TESTING | Facility: HOSPITAL | Age: 71
End: 2021-04-02

## 2021-04-02 ENCOUNTER — OFFICE VISIT (OUTPATIENT)
Dept: CARDIOLOGY | Facility: CLINIC | Age: 71
End: 2021-04-02

## 2021-04-02 ENCOUNTER — PREP FOR SURGERY (OUTPATIENT)
Dept: OTHER | Facility: HOSPITAL | Age: 71
End: 2021-04-02

## 2021-04-02 VITALS
TEMPERATURE: 97.5 F | HEIGHT: 68 IN | HEART RATE: 60 BPM | BODY MASS INDEX: 33.34 KG/M2 | OXYGEN SATURATION: 96 % | DIASTOLIC BLOOD PRESSURE: 60 MMHG | SYSTOLIC BLOOD PRESSURE: 118 MMHG | WEIGHT: 220 LBS

## 2021-04-02 VITALS — BODY MASS INDEX: 33.34 KG/M2 | HEIGHT: 68 IN | WEIGHT: 220 LBS

## 2021-04-02 DIAGNOSIS — Z45.010 ENCOUNTER FOR PACEMAKER AT END OF BATTERY LIFE: Primary | ICD-10-CM

## 2021-04-02 DIAGNOSIS — E78.5 DYSLIPIDEMIA: ICD-10-CM

## 2021-04-02 DIAGNOSIS — I49.5 SICK SINUS SYNDROME (HCC): ICD-10-CM

## 2021-04-02 DIAGNOSIS — I49.5 SICK SINUS SYNDROME (HCC): Primary | ICD-10-CM

## 2021-04-02 DIAGNOSIS — Z95.0 PACEMAKER: ICD-10-CM

## 2021-04-02 DIAGNOSIS — Z45.010 ENCOUNTER FOR PACEMAKER AT END OF BATTERY LIFE: ICD-10-CM

## 2021-04-02 LAB
ANION GAP SERPL CALCULATED.3IONS-SCNC: 11 MMOL/L (ref 5–15)
BUN SERPL-MCNC: 15 MG/DL (ref 8–23)
BUN/CREAT SERPL: 16.3 (ref 7–25)
CALCIUM SPEC-SCNC: 9.1 MG/DL (ref 8.6–10.5)
CHLORIDE SERPL-SCNC: 105 MMOL/L (ref 98–107)
CO2 SERPL-SCNC: 23 MMOL/L (ref 22–29)
CREAT SERPL-MCNC: 0.92 MG/DL (ref 0.76–1.27)
DEPRECATED RDW RBC AUTO: 42.5 FL (ref 37–54)
ERYTHROCYTE [DISTWIDTH] IN BLOOD BY AUTOMATED COUNT: 13.4 % (ref 12.3–15.4)
GFR SERPL CREATININE-BSD FRML MDRD: 81 ML/MIN/1.73
GLUCOSE SERPL-MCNC: 99 MG/DL (ref 65–99)
HCT VFR BLD AUTO: 51.2 % (ref 37.5–51)
HGB BLD-MCNC: 16.3 G/DL (ref 13–17.7)
MAGNESIUM SERPL-MCNC: 2.5 MG/DL (ref 1.6–2.4)
MCH RBC QN AUTO: 27.9 PG (ref 26.6–33)
MCHC RBC AUTO-ENTMCNC: 31.8 G/DL (ref 31.5–35.7)
MCV RBC AUTO: 87.7 FL (ref 79–97)
PLATELET # BLD AUTO: 198 10*3/MM3 (ref 140–450)
PMV BLD AUTO: 10 FL (ref 6–12)
POTASSIUM SERPL-SCNC: 4.5 MMOL/L (ref 3.5–5.2)
RBC # BLD AUTO: 5.84 10*6/MM3 (ref 4.14–5.8)
SODIUM SERPL-SCNC: 139 MMOL/L (ref 136–145)
WBC # BLD AUTO: 7.07 10*3/MM3 (ref 3.4–10.8)

## 2021-04-02 PROCEDURE — 80048 BASIC METABOLIC PNL TOTAL CA: CPT

## 2021-04-02 PROCEDURE — U0004 COV-19 TEST NON-CDC HGH THRU: HCPCS

## 2021-04-02 PROCEDURE — C9803 HOPD COVID-19 SPEC COLLECT: HCPCS

## 2021-04-02 PROCEDURE — 93288 INTERROG EVL PM/LDLS PM IP: CPT | Performed by: INTERNAL MEDICINE

## 2021-04-02 PROCEDURE — 85027 COMPLETE CBC AUTOMATED: CPT

## 2021-04-02 PROCEDURE — 99214 OFFICE O/P EST MOD 30 MIN: CPT | Performed by: INTERNAL MEDICINE

## 2021-04-02 PROCEDURE — 36415 COLL VENOUS BLD VENIPUNCTURE: CPT

## 2021-04-02 PROCEDURE — U0005 INFEC AGEN DETEC AMPLI PROBE: HCPCS

## 2021-04-02 RX ORDER — CEFAZOLIN SODIUM 2 G/100ML
2 INJECTION, SOLUTION INTRAVENOUS
Status: CANCELLED | OUTPATIENT
Start: 2021-04-02

## 2021-04-02 RX ORDER — ONDANSETRON 2 MG/ML
4 INJECTION INTRAMUSCULAR; INTRAVENOUS EVERY 6 HOURS PRN
Status: CANCELLED | OUTPATIENT
Start: 2021-04-02

## 2021-04-02 RX ORDER — SODIUM CHLORIDE 0.9 % (FLUSH) 0.9 %
10 SYRINGE (ML) INJECTION AS NEEDED
Status: CANCELLED | OUTPATIENT
Start: 2021-04-02

## 2021-04-02 RX ORDER — ACETAMINOPHEN 325 MG/1
650 TABLET ORAL EVERY 4 HOURS PRN
Status: CANCELLED | OUTPATIENT
Start: 2021-04-02

## 2021-04-02 RX ORDER — NITROGLYCERIN 0.4 MG/1
0.4 TABLET SUBLINGUAL
Status: CANCELLED | OUTPATIENT
Start: 2021-04-02

## 2021-04-02 RX ORDER — SODIUM CHLORIDE 0.9 % (FLUSH) 0.9 %
3 SYRINGE (ML) INJECTION EVERY 12 HOURS SCHEDULED
Status: CANCELLED | OUTPATIENT
Start: 2021-04-02

## 2021-04-02 NOTE — PAT
2nd Covid Vaccine appointment reminder printed for pt.     Patient to apply Chlorhexadine wipes  to surgical area (as instructed) the night before procedure and the AM of procedure. Wipes provided.    Polina Pittman done in PAT.

## 2021-04-02 NOTE — PROGRESS NOTES
[facility]    Encounter Date: 2021    Patient ID: Jeb Russo is a 70 y.o. male.  : 1950     PCP: Jeff Phan MD       Chief Complaint: device issue      PROBLEM LIST:  1. Sick Sinus Rhythm  a. Episode of Syncope   b. Subsequent extensive cardiac evaluation including negative cardiac catheterization study.   c. [facility] dual-chamber pacemaker implant in   d. PM generator change by Dr. Saini 2012  2. Dyslipidemia, not on any treatment.  3. Prostate cancer, s/p prostatectomy  4. Obesity, BMI 30  5. Surgical history:   a. Prostatectomy   b. Knee surgery/ ACL repair (remote past)    History of Present Illness  Patient presents today for a follow-up with a history of sick sinus syndrome, pacemaker, and cardiac risk factors. Since last visit, he has been feeling well overall from a cardiovascular standpoint. He received the first dose of his COVID-19 vaccine in [place] and is receiving his second dose on 2021. He hopes to receive his second dose in [place], otherwise, he will be flying back to [place] on 2021. He is still living primarily in [facility] and is returning at the end of the month. He has been staying busy by taking care of his dog and gardening. Patient otherwise denies chest pain, shortness of breath, PND, edema, palpitations, syncope, or presyncope at this time. He saw Dr. Phan earlier this week and got blood work. He lost approximately 10 kilograms at one point but gained it again. He prefers to manage his lipids and glucose with diet and exercise.       No Known Allergies      Current Outpatient Medications:   •  Multiple Vitamins-Minerals (MULTIVITAMIN ADULT PO), Take 15 mg by mouth Daily., Disp: , Rfl:   •  vitamin D3 125 MCG (5000 UT) capsule capsule, Take 5,000 Units by mouth Daily., Disp: , Rfl:   •  Zinc 30 MG tablet, Take  by mouth Daily., Disp: , Rfl:     The following portions of the  "patient's history were reviewed and updated as appropriate: allergies, current medications, past family history, past medical history, past social history, past surgical history and problem list.    ROS  Review of Systems   Constitution: Negative for chills, fever, fatigue, generalized weakness.   Cardiovascular: Negative for chest pain, dyspnea on exertion, leg swelling, palpitations, orthopnea, and syncope.   Respiratory: Negative for cough, shortness of breath, and wheezing.  HENT: Negative for ear pain, nosebleeds, and tinnitus.  Gastrointestinal: Negative for abdominal pain, constipation, diarrhea, nausea and vomiting.   Genitourinary: No urinary symptoms.  Musculoskeletal: Negative for muscle cramps.  Neurological: Negative for dizziness, headaches, loss of balance, numbness, and symptoms of stroke.  Psychiatric: Normal mental status.     All other systems reviewed and are negative.        Objective:     /60 (BP Location: Right arm, Patient Position: Sitting)   Pulse 60   Temp 97.5 °F (36.4 °C)   Ht 171.5 cm (67.5\")   Wt 99.8 kg (220 lb)   SpO2 96%   BMI 33.95 kg/m²      Physical Exam  Constitutional: Patient appears well-developed and well-nourished.   HENT: HEENT exam unremarkable.   Neck: Neck supple. No JVD present. No carotid bruits.   Cardiovascular: Normal rate, regular rhythm and normal heart sounds. No murmur heard.   2+ symmetric pulses.   Pulmonary/Chest: Breath sounds normal. Does not exhibit tenderness.   Abdominal: Abdomen benign.   Musculoskeletal: Does not exhibit edema.   Neurological: Neurological exam unremarkable.   Vitals reviewed.    Data Review:   Lab Results   Component Value Date    GLUCOSE 108 (H) 03/31/2021    BUN 13 03/31/2021    CREATININE 0.91 03/31/2021    EGFRIFNONA 82 03/31/2021    BCR 14.3 03/31/2021    K 4.6 03/31/2021    CO2 28.3 03/31/2021    CALCIUM 9.6 03/31/2021    ALBUMIN 4.10 03/31/2021    AST 24 03/31/2021    ALT 33 03/31/2021     Lab Results   Component " Value Date    CHOL 208 (H) 03/31/2021    TRIG 153 (H) 03/31/2021    HDL 41 03/31/2021     (H) 03/31/2021      Lab Results   Component Value Date    WBC 6.37 03/31/2021    RBC 6.15 (H) 03/31/2021    HGB 17.7 03/31/2021    HCT 53.3 (H) 03/31/2021    MCV 86.7 03/31/2021     03/31/2021     Lab Results   Component Value Date    HGBA1C 6.31 (H) 03/31/2021        Procedures   Manual device interrogation, 04/02/21:   Normal, well-functioning [employer] PPM with end of battery life.  Events: None  Device updates: None        Assessment:      Diagnosis Plan   1. Sick sinus syndrome (CMS/HCC)  Device reaching end of battery life. Schedule generator change for next week. Begin aspirin 81 mg daily for antiplatelet therapy.    2. Pacemaker end of battery life. Schedule generator change for next week.    3. Dyslipidemia  Begin routine aerobic exercise for at least 30 minutes 5 days per week and follow a heart healthy diet. Patient prefers to try and manage his lipids and glucose with diet and exercise.      Plan:   Stable cardiac status.   Will schedule the second dose of his COVID-19 vaccine in [place] so he can undergo his procedure in a timely fashion..   Schedule PPM generator change for next week.   Begin aspirin 81 mg daily for antiplatelet therapy.   Begin routine aerobic exercise for at least 30 minutes 5 days per week and follow a heart healthy diet.  Abnormal labs including elevated A1c and lipid profile discussed with the patient and he was advised to immediately improve his diet and exercise regimen and follow-up with PCP for further recommendations.  Continue all other current medications.   FU after procedure, sooner as needed.  Advanced care planning to be discussed with DAVE Christensen.   Thank you for allowing us to participate in the care of your patient.     Scribed for Valentina Saini MD by Zahraa Montiel. 4/2/2021  13:19 EDT      IValentina MD, personally performed the services  described in this documentation as scribed by the above named individual in my presence, and it is both accurate and complete.  4/2/2021  14:10 EDT        Please note that portions of this note may have been completed with a voice recognition program. Efforts were made to edit the dictations, but occasionally words are mistranscribed.

## 2021-04-03 LAB — SARS-COV-2 RNA NOSE QL NAA+PROBE: NOT DETECTED

## 2021-04-05 ENCOUNTER — HOSPITAL ENCOUNTER (OUTPATIENT)
Facility: HOSPITAL | Age: 71
Setting detail: HOSPITAL OUTPATIENT SURGERY
Discharge: HOME | End: 2021-04-05
Attending: INTERNAL MEDICINE | Admitting: PHYSICIAN ASSISTANT

## 2021-04-05 VITALS
OXYGEN SATURATION: 96 % | BODY MASS INDEX: 34.53 KG/M2 | HEART RATE: 60 BPM | TEMPERATURE: 97.3 F | RESPIRATION RATE: 16 BRPM | HEIGHT: 67 IN | DIASTOLIC BLOOD PRESSURE: 96 MMHG | SYSTOLIC BLOOD PRESSURE: 132 MMHG | WEIGHT: 220.02 LBS

## 2021-04-05 DIAGNOSIS — Z45.010 ENCOUNTER FOR PACEMAKER AT END OF BATTERY LIFE: ICD-10-CM

## 2021-04-05 PROCEDURE — S0260 H&P FOR SURGERY: HCPCS | Performed by: INTERNAL MEDICINE

## 2021-04-05 PROCEDURE — 33228 REMV&REPLC PM GEN DUAL LEAD: CPT | Performed by: INTERNAL MEDICINE

## 2021-04-05 PROCEDURE — 25010000002 FENTANYL CITRATE (PF) 100 MCG/2ML SOLUTION: Performed by: INTERNAL MEDICINE

## 2021-04-05 PROCEDURE — 25010000003 CEFAZOLIN IN DEXTROSE 2-4 GM/100ML-% SOLUTION: Performed by: PHYSICIAN ASSISTANT

## 2021-04-05 PROCEDURE — C1785 PMKR, DUAL, RATE-RESP: HCPCS | Performed by: INTERNAL MEDICINE

## 2021-04-05 PROCEDURE — 99152 MOD SED SAME PHYS/QHP 5/>YRS: CPT | Performed by: INTERNAL MEDICINE

## 2021-04-05 PROCEDURE — 25010000002 MIDAZOLAM PER 1 MG: Performed by: INTERNAL MEDICINE

## 2021-04-05 PROCEDURE — 99153 MOD SED SAME PHYS/QHP EA: CPT | Performed by: INTERNAL MEDICINE

## 2021-04-05 PROCEDURE — 93005 ELECTROCARDIOGRAM TRACING: CPT | Performed by: INTERNAL MEDICINE

## 2021-04-05 DEVICE — PACEMAKER
Type: IMPLANTABLE DEVICE | Status: FUNCTIONAL
Brand: ACCOLADE™ MRI DR

## 2021-04-05 RX ORDER — CEFAZOLIN SODIUM 2 G/100ML
2 INJECTION, SOLUTION INTRAVENOUS
Status: COMPLETED | OUTPATIENT
Start: 2021-04-05 | End: 2021-04-05

## 2021-04-05 RX ORDER — LIDOCAINE HYDROCHLORIDE 10 MG/ML
INJECTION, SOLUTION EPIDURAL; INFILTRATION; INTRACAUDAL; PERINEURAL AS NEEDED
Status: DISCONTINUED | OUTPATIENT
Start: 2021-04-05 | End: 2021-04-05 | Stop reason: HOSPADM

## 2021-04-05 RX ORDER — SODIUM CHLORIDE 0.9 % (FLUSH) 0.9 %
3 SYRINGE (ML) INJECTION EVERY 12 HOURS SCHEDULED
Status: DISCONTINUED | OUTPATIENT
Start: 2021-04-05 | End: 2021-04-05 | Stop reason: HOSPADM

## 2021-04-05 RX ORDER — SODIUM CHLORIDE 0.9 % (FLUSH) 0.9 %
10 SYRINGE (ML) INJECTION AS NEEDED
Status: DISCONTINUED | OUTPATIENT
Start: 2021-04-05 | End: 2021-04-05 | Stop reason: HOSPADM

## 2021-04-05 RX ORDER — FENTANYL CITRATE 50 UG/ML
INJECTION, SOLUTION INTRAMUSCULAR; INTRAVENOUS AS NEEDED
Status: DISCONTINUED | OUTPATIENT
Start: 2021-04-05 | End: 2021-04-05 | Stop reason: HOSPADM

## 2021-04-05 RX ORDER — NITROGLYCERIN 0.4 MG/1
0.4 TABLET SUBLINGUAL
Status: DISCONTINUED | OUTPATIENT
Start: 2021-04-05 | End: 2021-04-05 | Stop reason: HOSPADM

## 2021-04-05 RX ORDER — ONDANSETRON 2 MG/ML
4 INJECTION INTRAMUSCULAR; INTRAVENOUS EVERY 6 HOURS PRN
Status: DISCONTINUED | OUTPATIENT
Start: 2021-04-05 | End: 2021-04-05 | Stop reason: HOSPADM

## 2021-04-05 RX ORDER — MIDAZOLAM HYDROCHLORIDE 1 MG/ML
INJECTION INTRAMUSCULAR; INTRAVENOUS AS NEEDED
Status: DISCONTINUED | OUTPATIENT
Start: 2021-04-05 | End: 2021-04-05 | Stop reason: HOSPADM

## 2021-04-05 RX ORDER — ACETAMINOPHEN 325 MG/1
650 TABLET ORAL EVERY 4 HOURS PRN
Status: DISCONTINUED | OUTPATIENT
Start: 2021-04-05 | End: 2021-04-05 | Stop reason: HOSPADM

## 2021-04-05 RX ADMIN — CEFAZOLIN SODIUM 2 G: 2 INJECTION, SOLUTION INTRAVENOUS at 08:04

## 2021-04-05 NOTE — H&P
[facility] at [facility]  IP Progress Note      Chief Complaint: Pacemaker end of battery life.    Subjective   Please see office note of April 2, 2021, H&P updated, no changes.  Objective     There were no vitals taken for this visit.   No intake or output data in the 24 hours ending 04/05/21 0743    Physical Exam:  General: No acute distress.   Neck: no JVD.  Chest:No respiratory distress, breath sounds are normal. No wheezes,  rhonchi or rales.  Cardiovascular: Normal S1 and S2, no murmer, gallop or rub.    Extremities: No edema.    Results Review:     I reviewed the patient's new clinical results.    Results from last 7 days   Lab Units 04/02/21  1424   WBC 10*3/mm3 7.07   HEMOGLOBIN g/dL 16.3   HEMATOCRIT % 51.2*   PLATELETS 10*3/mm3 198     Results from last 7 days   Lab Units 04/02/21  1424 03/31/21  1015   SODIUM mmol/L 139 137   POTASSIUM mmol/L 4.5 4.6   CHLORIDE mmol/L 105 102   CO2 mmol/L 23.0 28.3   BUN mg/dL 15 13   CREATININE mg/dL 0.92 0.91   CALCIUM mg/dL 9.1 9.6   BILIRUBIN mg/dL  --  0.6   ALK PHOS U/L  --  60   ALT (SGPT) U/L  --  33   AST (SGOT) U/L  --  24   GLUCOSE mg/dL 99 108*     Results from last 7 days   Lab Units 04/02/21  1424   SODIUM mmol/L 139   POTASSIUM mmol/L 4.5   CHLORIDE mmol/L 105   CO2 mmol/L 23.0   BUN mg/dL 15   CREATININE mg/dL 0.92   GLUCOSE mg/dL 99   CALCIUM mg/dL 9.1         No results found for: CKTOTAL, CKMB, CKMBINDEX, TROPONINI, TROPONINT      Results from last 7 days   Lab Units 03/31/21  1015   CHOLESTEROL mg/dL 208*   TRIGLYCERIDES mg/dL 153*   HDL CHOL mg/dL 41   LDL CHOL mg/dL 139*           Tele: AV Paced      Assessment:  Pacemaker end of battery life.  History of sick sinus syndrome/syncope.  Status post PPM  Plan:  Pacemaker generator change.    Valentina Saini MD, FACC, [facility]

## 2021-04-05 NOTE — INTERVAL H&P NOTE
H&P reviewed. The patient was examined and there are no changes to the H&P.      Pre-cardiac Catheterization Report  Cardiovascular Laboratory  [facility]    Patient:  Jeb Russo  :  1950  PCP:  Jeff Phan MD  PHONE:  [phone]    DATE: 2021      MEDICATIONS:  Prior to Admission medications    Medication Sig Start Date End Date Taking? Authorizing Provider   Multiple Vitamins-Minerals (MULTIVITAMIN ADULT PO) Take 15 mg by mouth Daily.   Yes ProviderLory MD   vitamin D3 125 MCG (5000 UT) capsule capsule Take 5,000 Units by mouth Daily.   Yes ProviderLory MD   Zinc 30 MG tablet Take 30 mg by mouth Daily.   Yes ProviderLory MD       Past medical & surgical history, social and family history reviewed in the electronic medical record.    Physical Exam:    Vitals:   Vitals:    21 0735   BP: 103/75   Pulse: 60   Temp: 97.3 °F (36.3 °C)   SpO2: 96%          2135   Weight: 99.8 kg (220 lb 0.3 oz)   Body mass index is 34.46 kg/m².    GENERAL: No apparent distress.  No significant changes since last exam.  CHEST: Clear to auscultation bilaterally no stridor no wheeze.  CV: S1, S2, Regular without Murmurs, Rubs or Gallops  EXTREMITIES: No edema.        Results from last 7 days   Lab Units 21  1424   SODIUM mmol/L 139   POTASSIUM mmol/L 4.5   CHLORIDE mmol/L 105   CO2 mmol/L 23.0   BUN mg/dL 15   CREATININE mg/dL 0.92   GLUCOSE mg/dL 99   CALCIUM mg/dL 9.1     Results from last 7 days   Lab Units 21  1424   WBC 10*3/mm3 7.07   HEMOGLOBIN g/dL 16.3   HEMATOCRIT % 51.2*   PLATELETS 10*3/mm3 198     Lab Results   Component Value Date    CHOL 208 (H) 2021    CHOL 233 (H) 2019    CHOL 206 (H) 2017     Lab Results   Component Value Date    TRIG 153 (H) 2021    TRIG 136 2019    TRIG 119 2017     Lab Results   Component Value Date    HDL 41 2021    HDL 42 2019    HDL 43 2017      Lab Results   Component Value Date    CHOL 208 (H) 03/31/2021    TRIG 153 (H) 03/31/2021    HDL 41 03/31/2021     (H) 03/31/2021     Results from last 7 days   Lab Units 03/31/21  1015   HEMOGLOBIN A1C % 6.31*     Estimated Creatinine Clearance: 82.9 mL/min (by C-G formula based on SCr of 0.92 mg/dL).    SARS-CoV-2 NAEL   Date Value Ref Range Status   04/02/2021 Not Detected Not Detected Final         IMPRESSION:  Pacemaker battery EOL    PLAN:generator change      Electronically signed by GIO Rolon, 04/05/21, 7:57 AM EDT.

## 2021-04-05 NOTE — DISCHARGE INSTR - APPOINTMENTS
The office will be calling to confirm an appointment with Dr. Saini on April 14th at 3pm. If you have any questions please call [phone].

## 2021-04-12 ENCOUNTER — IMMUNIZATION (OUTPATIENT)
Dept: VACCINE CLINIC | Facility: HOSPITAL | Age: 71
End: 2021-04-12

## 2021-04-12 ENCOUNTER — TELEPHONE (OUTPATIENT)
Dept: FAMILY MEDICINE CLINIC | Facility: CLINIC | Age: 71
End: 2021-04-12

## 2021-04-12 DIAGNOSIS — Z85.46 HISTORY OF PROSTATE CANCER: Primary | ICD-10-CM

## 2021-04-12 PROCEDURE — 91300 HC SARSCOV02 VAC 30MCG/0.3ML IM: CPT | Performed by: INTERNAL MEDICINE

## 2021-04-12 PROCEDURE — 0001A: CPT | Performed by: INTERNAL MEDICINE

## 2021-04-12 NOTE — TELEPHONE ENCOUNTER
A user error has taken place: encounter opened in error, closed for administrative reasons.

## 2021-04-12 NOTE — TELEPHONE ENCOUNTER
----- Message from Isabel Roberts sent at 4/12/2021 10:42 AM EDT -----  Regarding: LAB ORDER  Patient's sister called, Italo and stated that her brother needs another PSA test. He has a past history of prostate cancer and his urologist back in [place] wants him to repeat the test due to this test reading higher than the last two tests and wants to make sure it is correct. You can reach Italo at [phone].   Thanks  Isabel.....

## 2021-04-12 NOTE — TELEPHONE ENCOUNTER
PTS SISTER CALLED, ASKED IF THEY COULD GET A PSA TEST ORDERD BEFORE 11. PT HAS A COVID TEST AT 11:20. [phone]

## 2021-04-13 ENCOUNTER — OFFICE VISIT (OUTPATIENT)
Dept: FAMILY MEDICINE CLINIC | Facility: CLINIC | Age: 71
End: 2021-04-13

## 2021-04-13 ENCOUNTER — LAB (OUTPATIENT)
Dept: LAB | Facility: HOSPITAL | Age: 71
End: 2021-04-13

## 2021-04-13 VITALS
BODY MASS INDEX: 34.21 KG/M2 | OXYGEN SATURATION: 99 % | HEIGHT: 67 IN | SYSTOLIC BLOOD PRESSURE: 118 MMHG | DIASTOLIC BLOOD PRESSURE: 66 MMHG | HEART RATE: 71 BPM | TEMPERATURE: 97.2 F | WEIGHT: 218 LBS

## 2021-04-13 DIAGNOSIS — Z85.46 HISTORY OF PROSTATE CANCER: ICD-10-CM

## 2021-04-13 DIAGNOSIS — R79.0 ABNORMAL BLOOD LEVEL OF MAGNESIUM: Primary | ICD-10-CM

## 2021-04-13 DIAGNOSIS — R79.0 ABNORMAL BLOOD LEVEL OF MAGNESIUM: ICD-10-CM

## 2021-04-13 DIAGNOSIS — M19.90 ARTHRITIS: ICD-10-CM

## 2021-04-13 DIAGNOSIS — R73.03 PREDIABETES: ICD-10-CM

## 2021-04-13 LAB — MAGNESIUM SERPL-MCNC: 2.5 MG/DL (ref 1.6–2.4)

## 2021-04-13 PROCEDURE — 36415 COLL VENOUS BLD VENIPUNCTURE: CPT

## 2021-04-13 PROCEDURE — 99212 OFFICE O/P EST SF 10 MIN: CPT | Performed by: PHYSICIAN ASSISTANT

## 2021-04-13 PROCEDURE — 84153 ASSAY OF PSA TOTAL: CPT

## 2021-04-13 PROCEDURE — 83735 ASSAY OF MAGNESIUM: CPT

## 2021-04-13 NOTE — PROGRESS NOTES
Subjective   Jeb Russo is a 70 y.o. male  Follow-up (follow up on recent labs )      History of Present Illness  Patient is a pleasant 70-year-old patient comes in today for follow-up on recent elevation of PSA, he has a history of prostate cancer status post prostatectomy 2015 his urologist is in [place] he is going be traveling there and 2 days.  Recent PSA was elevated compared to last year and his urologist recommended he have it repeated.  Patient will have his pacemaker changed recently magnesium level was mildly elevated he is concerned that that, labs that I ordered at his last appointment showed his blood does feel that the prediabetic range he is here to discuss abnormal lab results today.  He is feeling well denies any problems weight is down 2 pounds he is trying to improve diet and increase his exercise frequency.  The following portions of the patient's history were reviewed and updated as appropriate: allergies, current medications, past social history and problem list    Review of Systems   Constitutional: Negative for activity change, appetite change, fatigue and unexpected weight change.   Respiratory: Negative for chest tightness.    Cardiovascular: Negative for chest pain and palpitations.   Genitourinary: Negative.    Musculoskeletal: Positive for arthralgias.   Psychiatric/Behavioral: Negative for agitation, behavioral problems, confusion, decreased concentration, dysphoric mood and sleep disturbance. The patient is not nervous/anxious and is not hyperactive.        Objective     Vitals:    04/13/21 1135   BP: 118/66   Pulse: 71   Temp: 97.2 °F (36.2 °C)   SpO2: 99%       Physical Exam  Vitals and nursing note reviewed.   Constitutional:       General: He is not in acute distress.     Appearance: Normal appearance. He is well-developed. He is obese. He is not ill-appearing, toxic-appearing or diaphoretic.   HENT:      Head: Normocephalic and atraumatic.   Cardiovascular:      Rate  and Rhythm: Normal rate and regular rhythm.      Heart sounds: Normal heart sounds.   Pulmonary:      Effort: Pulmonary effort is normal.   Musculoskeletal:         General: No tenderness.   Neurological:      Mental Status: He is alert and oriented to person, place, and time.      Cranial Nerves: No cranial nerve deficit.      Gait: Gait normal.   Psychiatric:         Attention and Perception: He is attentive.         Mood and Affect: Mood normal.         Speech: Speech normal.         Behavior: Behavior normal.         Thought Content: Thought content normal.         Judgment: Judgment normal.         Assessment/Plan     Diagnoses and all orders for this visit:    1. Abnormal blood level of magnesium (Primary)  -     Magnesium; Future    2. History of prostate cancer    3. Prediabetes    4. Arthritis    Other orders  -     diclofenac (VOLTAREN) 50 MG EC tablet; Take 1 tablet by mouth 3 (Three) Times a Day. For arthritis  Dispense: 90 tablet; Refill: 1    Discussed results of prediabetes, encouraged increased exercise, weight loss and diabetic diet, will repeat PSA today along with magnesium level notify patient of labs when I receive them.  He will forward his results of his PSA to his urologist will be following up with him next week in [place].

## 2021-04-14 LAB — PSA SERPL-MCNC: 2.08 NG/ML (ref 0–4)

## 2021-05-10 LAB
QT INTERVAL: 502 MS
QTC INTERVAL: 502 MS

## 2021-09-20 ENCOUNTER — HOSPITAL ENCOUNTER (OUTPATIENT)
Dept: RADIATION ONCOLOGY | Facility: HOSPITAL | Age: 71
Setting detail: RADIATION/ONCOLOGY SERIES
Discharge: HOME | End: 2021-09-20

## 2021-09-20 ENCOUNTER — OFFICE VISIT (OUTPATIENT)
Dept: RADIATION ONCOLOGY | Facility: HOSPITAL | Age: 71
End: 2021-09-20

## 2021-09-20 VITALS
TEMPERATURE: 97 F | DIASTOLIC BLOOD PRESSURE: 78 MMHG | HEART RATE: 60 BPM | SYSTOLIC BLOOD PRESSURE: 142 MMHG | BODY MASS INDEX: 33.34 KG/M2 | OXYGEN SATURATION: 96 % | WEIGHT: 212.9 LBS | RESPIRATION RATE: 20 BRPM

## 2021-09-20 DIAGNOSIS — C61 PROSTATE CANCER (HCC): Primary | ICD-10-CM

## 2021-09-20 PROCEDURE — G0463 HOSPITAL OUTPT CLINIC VISIT: HCPCS

## 2021-09-20 NOTE — PROGRESS NOTES
CONSULTATION NOTE      :                                                          1950  DATE OF CONSULTATION:                       2021   REQUESTING PHYSICIAN:                   Julián Foley MD  REASON FOR CONSULTATION:           Prostate cancer (CMS/Formerly [facility])  Pathologic bone- Stage BENNIE (pT2, pN1, cM0, PSA: 5, Grade Group: 2)       BRIEF HISTORY:  The patient is a very pleasant 71 y.o. male  with biochemical recurrence of prostate cancer.  Status post robotic prostatectomy performed 2015 at [facility] in [place], [place] by Dr. Jack Dwyer.  Patient believes his preoperative PSA was around 5 ng/mL.  Pathology report is available for review.  This showed prostatic adenocarcinoma, [place]'s 3+4 = 7, involving 12% of the prostate gland.    There was presence of perineural invasion.    There was no definite evidence of lymphovascular invasion.  Margins were negative.  Seminal vesicles were not involved.    An extensive lymph node sampling was performed.  2 out of 19 lymph nodes contained metastatic disease.  These lymph nodes were in the left external iliac chain with the largest tumor deposit 9.5 mm.  There was no mention of extracapsular extension.  Patient healed well after surgery and regained full bladder control with excellent performance status.  PSA apparently was undetectable initially.  He did not receive adjuvant hormonal therapy or radiotherapy.  Subsequent PSA levels have increased with values:  2017 = 0.1 ng/ml   2019 = 0.221 ng/ml  3/31/2021 = 1.96 ng/ml  2021 = 2.08 ng/ml  2021 = 1.606 ng/ml    On 2021 he underwent restaging imaging studies performed at [facility].  Nuclear medicine bone scan showed no evidence of bony metastasis.  CT scan of chest, abdomen and pelvis showed borderline axillary and mediastinal lymph nodes thought to be possibly related to previous Covid vaccination reaction.  There was no evidence of pathologic lymphadenopathy in the  abdomen or pelvis.  There was no suspicious mass in the region of the prostate bed or elsewhere.    He initiated androgen ablation with LHRH agonist injection and bicalutamide in 2021.  He has had significant hormone withdrawal symptoms of hot flashes, fatigue and impaired libido.  He has had a very good response to androgen ablation with PSA performed last week at [facility] reported to be 0.12 ng/ml.  He was given options of continuing androgen ablation versus stopping all treatment and restaging with prostate-specific PET/CT when PSA begins to rise again to determine if there is any evidence of more extensive extraprostatic spread.  Patient is uncomfortable with that approach.  Patient, his family and his other physicians would like to proceed with definitive salvage pelvic irradiation.    Allergy: No Known Allergies    Social History:   Social History     Socioeconomic History   • Marital status:      Spouse name: Not on file   • Number of children: Not on file   • Years of education: Not on file   • Highest education level: Not on file   Tobacco Use   • Smoking status: Former Smoker     Packs/day: 0.50     Years: 1.00     Pack years: 0.50     Types: Cigarettes     Quit date:      Years since quittin.7   • Smokeless tobacco: Never Used   Substance and Sexual Activity   • Alcohol use: Yes     Alcohol/week: 1.0 standard drinks     Types: 1 Glasses of wine per week     Comment: weekly   • Drug use: No   • Sexual activity: Defer       Past Medical History:   Past Medical History:   Diagnosis Date   • Cancer (CMS/HCC)     prostate   • Encounter for pacemaker at end of battery life 2021    Added automatically from request for surgery [id number]   • Prostate cancer (CMS/HCC)        Family History: family history includes Thyroid disease in his father and mother.     Surgical History:   Past Surgical History:   Procedure Laterality Date   • ANKLE SURGERY     • CARDIAC  ELECTROPHYSIOLOGY PROCEDURE N/A 4/5/2021    Procedure: PPM generator change - dual  [email or web address];  Surgeon: Valentina Saini MD;  Location: [facility] INVASIVE LOCATION;  Service: Cardiology;  Laterality: N/A;   • COLONOSCOPY     • INSERT / REPLACE / REMOVE PACEMAKER  2009   • KNEE ACL RECONSTRUCTION     • KNEE ACL RECONSTRUCTION     • PROSTATE SURGERY  2015        Review of Systems:   Review of Systems   All other systems reviewed and are negative.          IPSS Questionnaire (AUA-7):  Over the past month…    1)  Incomplete Emptying  How often have you had a sensation of not emptying your bladder?  0 - Not at all   2)  Frequency  How often have you had to urinate less than every two hours? 0 - Not at all   3)  Intermittency  How often have you found you stopped and started again several times when you urinated?  0 - Not at all   4) Urgency  How often have you found it difficult to postpone urination?  0 - Not at all   5) Weak Stream  How often have you had a weak urinary stream?  0 - Not at all   6) Straining  How often have you had to push or strain to begin urination?  0 - Not at all   7) Nocturia  How many times did you typically get up at night to urinate?  2 - 2 times   Total Score:  2       Quality of life due to urinary symptoms:  If you were to spend the rest of your life with your urinary condition the way it is now, how would you feel about that? 1-Pleased   Urine Leakage (Incontinence) 0-No Leakage     Sexual Health Inventory  Current Status    1)  How do you rate your confidence that you could achieve and keep an erection? 2-Low   2) When you had erections with sexual stimulation, how often were your erections hard enough for penetration (entering your partner)? 4-Most times (much more than half the time)   3)  During sexual intercourse, how often were you able to maintain your erection after you had penetrated (entered) into your partner? 4-Most times (much more than half the time)   4) During  sexual intercourse, how difficult was it to maintain your erection to completion of intercourse? 4-Slightly difficult   5) When you attempted sexual intercourse, how often was it satisfactory to you? 4-Most times (much more than half the time)   Total Score: 18       Bowel Health Inventory  Current Status: 0-No problems, no rectal bleeding, no discharge, less then 5 bowel movements a day             Objective   VITAL SIGNS:   Vitals:    09/20/21 1304   BP: 142/78   Pulse: 60   Resp: 20   Temp: 97 °F (36.1 °C)   TempSrc: Temporal   SpO2: 96%   Weight: 96.6 kg (212 lb 14.4 oz)   PainSc: 0-No pain        Karnofsky score: 90     Physical Exam:   Physical Exam  Vitals and nursing note reviewed.   Constitutional:       Appearance: He is well-developed.   HENT:      Head: Normocephalic and atraumatic.   Cardiovascular:      Rate and Rhythm: Normal rate and regular rhythm.      Heart sounds: Normal heart sounds. No murmur heard.     Pulmonary:      Effort: Pulmonary effort is normal.      Breath sounds: Normal breath sounds. No wheezing or rales.   Abdominal:      General: Bowel sounds are normal. There is no distension.      Palpations: Abdomen is soft.      Tenderness: There is no abdominal tenderness.   Musculoskeletal:         General: No tenderness. Normal range of motion.      Cervical back: Normal range of motion and neck supple.   Lymphadenopathy:      Cervical: No cervical adenopathy.      Upper Body:      Right upper body: No supraclavicular adenopathy.      Left upper body: No supraclavicular adenopathy.   Skin:     General: Skin is warm and dry.   Neurological:      Mental Status: He is alert and oriented to person, place, and time.      Sensory: No sensory deficit.   Psychiatric:         Behavior: Behavior normal.         Thought Content: Thought content normal.         Judgment: Judgment normal.              The following portions of the patient's history were reviewed and updated as appropriate: allergies,  current medications, past family history, past medical history, past social history, past surgical history and problem list.    Assessment:   Assessment      Biochemical recurrence of prostate cancer.    He is status post robotic prostatectomy with lymph node dissection 2/4/2015.  He had [place]'s 3+4 = 7, pathologic stage Amy (T2, and 1, M0).  He had 2+ left external iliac lymph nodes containing subcentimeter metastatic deposits.  6 years later he has biochemical recurrence with no obvious source of disease on conventional imaging studies.  The more prostate-specific imaging studies, such as Pylarify PSMA PET or Axumen PET, may not be as useful at this time since he initiated androgen ablation 5 months ago and has already had a very good biochemical response.    This does indicate that he still has hormone sensitive disease.  Late recurrence is also in favor of still having disease confined to the pelvis.  I agree that it would be reasonable to offer salvage pelvic irradiation at this time as definitive treatment.    If he has a complete biochemical response, he would be able to discontinue androgen ablation to improve his quality of life.  We reviewed the use of radiotherapy in this situation in detail today.  Informed consent was obtained.    RECOMMENDATIONS: He will return tomorrow for CT simulation.  Patient will try to obtain a copy of the disc containing imaging studies performed in April 2021 for review.  We will try to get a copy of his most recent PSA performed last week at [facility].  Patient is due for screening colonoscopy, which will be performed prior to initiation of radiotherapy.  The prostate bed and higher risk left external iliac region, if it can be determined, will receive a minimum dose of 64 Gray delivered over 6-1/2 weeks.  The lower risk pelvic lymphatics will receive a minimum dose of 50-54 Gray concurrently using IMRT technique with simultaneous integrated boost on the  helical Tomotherapy unit with image guidance.  We will try to send notes also to his Urologist at [facility], Dr Jack Dwyer and to patient's nephew, Dr Leora Angel, who is also a Urologist.    Follow Up:   Return in about 1 day (around 9/21/2021) for Simulation.  Diagnoses and all orders for this visit:    1. Prostate cancer (CMS/HCC) (Primary)         Niraj Otoole MD

## 2021-09-21 ENCOUNTER — HOSPITAL ENCOUNTER (OUTPATIENT)
Dept: RADIATION ONCOLOGY | Facility: HOSPITAL | Age: 71
Discharge: HOME | End: 2021-09-21

## 2021-09-21 DIAGNOSIS — Z12.11 ENCOUNTER FOR SCREENING COLONOSCOPY: Primary | ICD-10-CM

## 2021-09-23 ENCOUNTER — OUTSIDE FACILITY SERVICE (OUTPATIENT)
Dept: GASTROENTEROLOGY | Facility: CLINIC | Age: 71
End: 2021-09-23

## 2021-09-23 PROCEDURE — 88305 TISSUE EXAM BY PATHOLOGIST: CPT | Performed by: INTERNAL MEDICINE

## 2021-09-23 PROCEDURE — 45385 COLONOSCOPY W/LESION REMOVAL: CPT | Performed by: INTERNAL MEDICINE

## 2021-09-23 PROCEDURE — G0500 MOD SEDAT ENDO SERVICE >5YRS: HCPCS | Performed by: INTERNAL MEDICINE

## 2021-09-24 ENCOUNTER — LAB REQUISITION (OUTPATIENT)
Dept: LAB | Facility: HOSPITAL | Age: 71
End: 2021-09-24

## 2021-09-24 DIAGNOSIS — Z12.11 ENCOUNTER FOR SCREENING FOR MALIGNANT NEOPLASM OF COLON: ICD-10-CM

## 2021-09-27 LAB
CYTO UR: NORMAL
LAB AP CASE REPORT: NORMAL
LAB AP CLINICAL INFORMATION: NORMAL
PATH REPORT.FINAL DX SPEC: NORMAL
PATH REPORT.GROSS SPEC: NORMAL

## 2021-09-29 PROCEDURE — 77300 RADIATION THERAPY DOSE PLAN: CPT | Performed by: RADIOLOGY

## 2021-09-29 PROCEDURE — 77338 DESIGN MLC DEVICE FOR IMRT: CPT | Performed by: RADIOLOGY

## 2021-09-29 PROCEDURE — 77301 RADIOTHERAPY DOSE PLAN IMRT: CPT | Performed by: RADIOLOGY

## 2021-09-30 ENCOUNTER — HOSPITAL ENCOUNTER (OUTPATIENT)
Dept: RADIATION ONCOLOGY | Facility: HOSPITAL | Age: 71
Discharge: HOME | End: 2021-09-30

## 2021-09-30 PROCEDURE — 77385: CPT | Performed by: RADIOLOGY

## 2021-10-01 ENCOUNTER — HOSPITAL ENCOUNTER (OUTPATIENT)
Dept: RADIATION ONCOLOGY | Facility: HOSPITAL | Age: 71
Setting detail: RADIATION/ONCOLOGY SERIES
Discharge: HOME | End: 2021-10-01

## 2021-10-01 ENCOUNTER — HOSPITAL ENCOUNTER (OUTPATIENT)
Dept: RADIATION ONCOLOGY | Facility: HOSPITAL | Age: 71
Discharge: HOME | End: 2021-10-01

## 2021-10-01 PROCEDURE — 77336 RADIATION PHYSICS CONSULT: CPT | Performed by: RADIOLOGY

## 2021-10-01 PROCEDURE — 77385: CPT | Performed by: RADIOLOGY

## 2021-10-04 ENCOUNTER — HOSPITAL ENCOUNTER (OUTPATIENT)
Dept: RADIATION ONCOLOGY | Facility: HOSPITAL | Age: 71
Discharge: HOME | End: 2021-10-04

## 2021-10-04 PROCEDURE — 77385: CPT | Performed by: RADIOLOGY

## 2021-10-05 ENCOUNTER — HOSPITAL ENCOUNTER (OUTPATIENT)
Dept: RADIATION ONCOLOGY | Facility: HOSPITAL | Age: 71
Discharge: HOME | End: 2021-10-05

## 2021-10-05 VITALS — BODY MASS INDEX: 33 KG/M2 | WEIGHT: 210.7 LBS

## 2021-10-05 PROCEDURE — 77385: CPT | Performed by: RADIOLOGY

## 2021-10-06 ENCOUNTER — HOSPITAL ENCOUNTER (OUTPATIENT)
Dept: RADIATION ONCOLOGY | Facility: HOSPITAL | Age: 71
Discharge: HOME | End: 2021-10-06

## 2021-10-06 PROCEDURE — 77385: CPT | Performed by: RADIOLOGY

## 2021-10-07 ENCOUNTER — HOSPITAL ENCOUNTER (OUTPATIENT)
Dept: RADIATION ONCOLOGY | Facility: HOSPITAL | Age: 71
Discharge: HOME | End: 2021-10-07

## 2021-10-07 PROCEDURE — 77385: CPT | Performed by: RADIOLOGY

## 2021-10-08 ENCOUNTER — HOSPITAL ENCOUNTER (OUTPATIENT)
Dept: RADIATION ONCOLOGY | Facility: HOSPITAL | Age: 71
Discharge: HOME | End: 2021-10-08

## 2021-10-08 PROCEDURE — 77336 RADIATION PHYSICS CONSULT: CPT | Performed by: RADIOLOGY

## 2021-10-08 PROCEDURE — 77385: CPT | Performed by: RADIOLOGY

## 2021-10-11 ENCOUNTER — HOSPITAL ENCOUNTER (OUTPATIENT)
Dept: RADIATION ONCOLOGY | Facility: HOSPITAL | Age: 71
Discharge: HOME | End: 2021-10-11

## 2021-10-11 PROCEDURE — 77385: CPT | Performed by: RADIOLOGY

## 2021-10-12 ENCOUNTER — HOSPITAL ENCOUNTER (OUTPATIENT)
Dept: RADIATION ONCOLOGY | Facility: HOSPITAL | Age: 71
Discharge: HOME | End: 2021-10-12

## 2021-10-12 VITALS — WEIGHT: 210.2 LBS | BODY MASS INDEX: 32.92 KG/M2

## 2021-10-12 PROCEDURE — 77385: CPT | Performed by: RADIOLOGY

## 2021-10-13 ENCOUNTER — HOSPITAL ENCOUNTER (OUTPATIENT)
Dept: RADIATION ONCOLOGY | Facility: HOSPITAL | Age: 71
Discharge: HOME | End: 2021-10-13

## 2021-10-13 ENCOUNTER — DOCUMENTATION (OUTPATIENT)
Dept: NUTRITION | Facility: HOSPITAL | Age: 71
End: 2021-10-13

## 2021-10-13 PROCEDURE — 77385: CPT | Performed by: RADIOLOGY

## 2021-10-13 NOTE — PROGRESS NOTES
ONC Nutrition    Diagnosis: Biochemical recurrence of prostate cancer   Surgery:  Status post robotic prostatectomy performed 2/4/2015 at [facility] in [place], [place] by Dr. Jack Dwyer / perineural invasion; no definite evidence of lymphovascular invasion; negative margins; seminal vesicles were not involved  Androgen Ablation:  Initiated androgen ablation with LHRH agonist injection and bicalutamide in April 2021 / very good response to androgen ablation with PSA performed last week at [facility] reported to be 0.12 ng/ml  Radiation:  Definitive salvage pelvic irradiation / minimum dose of 64 Gray delivered over 6-1/2 weeks.  The lower risk pelvic lymphatics will receive a minimum dose of 50-54 Gray concurrently using IMRT technique with simultaneous integrated boost on the helical Tomotherapy unit with image guidance    Weight 210.2 lbs / stable weight    Consulted with patient during RAD ONC status checks.  Patient is progressing well with treatment; good appetite and oral intake.  Discussed possible nutritionally related side effects of treatment including fatigue, gas and bowel changes that he may experience with progression of treatment.  Questions addressed; patient verbalized good comprehension. Will follow.

## 2021-10-14 ENCOUNTER — HOSPITAL ENCOUNTER (OUTPATIENT)
Dept: RADIATION ONCOLOGY | Facility: HOSPITAL | Age: 71
Discharge: HOME | End: 2021-10-14

## 2021-10-14 PROCEDURE — 77385: CPT | Performed by: RADIOLOGY

## 2021-10-15 ENCOUNTER — HOSPITAL ENCOUNTER (OUTPATIENT)
Dept: RADIATION ONCOLOGY | Facility: HOSPITAL | Age: 71
Discharge: HOME | End: 2021-10-15

## 2021-10-15 PROCEDURE — 77336 RADIATION PHYSICS CONSULT: CPT | Performed by: RADIOLOGY

## 2021-10-15 PROCEDURE — 77385: CPT | Performed by: RADIOLOGY

## 2021-10-18 ENCOUNTER — HOSPITAL ENCOUNTER (OUTPATIENT)
Dept: RADIATION ONCOLOGY | Facility: HOSPITAL | Age: 71
Discharge: HOME | End: 2021-10-18

## 2021-10-18 PROCEDURE — 77385: CPT | Performed by: RADIOLOGY

## 2021-10-19 ENCOUNTER — HOSPITAL ENCOUNTER (OUTPATIENT)
Dept: RADIATION ONCOLOGY | Facility: HOSPITAL | Age: 71
Discharge: HOME | End: 2021-10-19

## 2021-10-19 VITALS — BODY MASS INDEX: 33.44 KG/M2 | WEIGHT: 213.5 LBS

## 2021-10-19 PROCEDURE — 77385: CPT | Performed by: RADIOLOGY

## 2021-10-20 ENCOUNTER — HOSPITAL ENCOUNTER (OUTPATIENT)
Dept: RADIATION ONCOLOGY | Facility: HOSPITAL | Age: 71
Discharge: HOME | End: 2021-10-20

## 2021-10-20 PROCEDURE — 77385: CPT | Performed by: RADIOLOGY

## 2021-10-21 ENCOUNTER — HOSPITAL ENCOUNTER (OUTPATIENT)
Dept: RADIATION ONCOLOGY | Facility: HOSPITAL | Age: 71
Discharge: HOME | End: 2021-10-21

## 2021-10-21 PROCEDURE — 77385: CPT | Performed by: RADIOLOGY

## 2021-10-22 ENCOUNTER — HOSPITAL ENCOUNTER (OUTPATIENT)
Dept: RADIATION ONCOLOGY | Facility: HOSPITAL | Age: 71
Discharge: HOME | End: 2021-10-22

## 2021-10-22 PROCEDURE — 77336 RADIATION PHYSICS CONSULT: CPT | Performed by: RADIOLOGY

## 2021-10-22 PROCEDURE — 77385: CPT | Performed by: RADIOLOGY

## 2021-10-25 ENCOUNTER — HOSPITAL ENCOUNTER (OUTPATIENT)
Dept: RADIATION ONCOLOGY | Facility: HOSPITAL | Age: 71
Discharge: HOME | End: 2021-10-25

## 2021-10-25 PROCEDURE — 77385: CPT | Performed by: RADIOLOGY

## 2021-10-26 ENCOUNTER — HOSPITAL ENCOUNTER (OUTPATIENT)
Dept: RADIATION ONCOLOGY | Facility: HOSPITAL | Age: 71
Discharge: HOME | End: 2021-10-26

## 2021-10-26 VITALS — BODY MASS INDEX: 33.11 KG/M2 | WEIGHT: 211.4 LBS

## 2021-10-26 PROCEDURE — 77385: CPT | Performed by: RADIOLOGY

## 2021-10-27 ENCOUNTER — HOSPITAL ENCOUNTER (OUTPATIENT)
Dept: RADIATION ONCOLOGY | Facility: HOSPITAL | Age: 71
Discharge: HOME | End: 2021-10-27

## 2021-10-27 PROCEDURE — 77385: CPT | Performed by: RADIOLOGY

## 2021-10-28 ENCOUNTER — HOSPITAL ENCOUNTER (OUTPATIENT)
Dept: RADIATION ONCOLOGY | Facility: HOSPITAL | Age: 71
Discharge: HOME | End: 2021-10-28

## 2021-10-28 PROCEDURE — 77385: CPT | Performed by: RADIOLOGY

## 2021-10-28 NOTE — PROGRESS NOTES
[facility] Cardiology    Encounter Date: 10/29/2021    Patient ID: Jeb Russo is a 71 y.o. male.  : 1950     PCP: Jeff Phan MD       Chief Complaint: Sick sinus syndrome (CMS/HCC)      PROBLEM LIST:  1. Sick Sinus Rhythm  a. Episode of Syncope   b. Subsequent extensive cardiac evaluation including negative cardiac catheterization study.   c. [facility] dual-chamber pacemaker implant in   d. PM generator change by Dr. Saini, 2012  e. PM generator change by Dr. Saini, 2021  2. Dyslipidemia, not on any treatment.  3. Prostate cancer, s/p prostatectomy  4. Obesity, BMI 30  5. Surgical history:   a. Prostatectomy   b. Knee surgery/ ACL repair (remote past)    History of Present Illness  Patient presents today for a follow-up s/p PPM generator change (21) with a history of sick sinus syndrome and cardiac risk factors. Since last visit, patient has experienced pain and discomfort from pacemaker being too close to surface. He's been receiving radiation treatments here at [facility] for prostate cancer and has about 10 treatments more to go. He will be here in [place] for two weeks until he has to return home in [facility]. Patient denies shortness of breath, palpitations, edema, dizziness, and syncope.     No Known Allergies      Current Outpatient Medications:   •  B Complex Vitamins (VITAMIN B COMPLEX PO), Take  by mouth Every Other Day., Disp: , Rfl:   •  Multiple Vitamins-Minerals (MULTIVITAMIN ADULT PO), Take 15 mg by mouth Daily., Disp: , Rfl:   •  vitamin D3 125 MCG (5000 UT) capsule capsule, Take 5,000 Units by mouth Daily., Disp: , Rfl:   •  Zinc 30 MG tablet, Take 30 mg by mouth Daily., Disp: , Rfl:   •  Sod Picosulfate-Mag Ox-Cit Acd 10-3.5-12 MG-GM -GM/160ML solution, Take 1 kit by mouth Take As Directed., Disp: 320 mL, Rfl: 0    The following portions of the patient's history were reviewed and updated as appropriate: allergies, current  "medications, past family history, past medical history, past social history, past surgical history and problem list.    ROS  Review of Systems   Constitution: Negative for chills, fever, fatigue, generalized weakness.   Cardiovascular: Negative for chest pain, dyspnea on exertion, leg swelling, palpitations, orthopnea, and syncope.  Pacemaker header is palpable under thin skin, there is no evidence of skin breakdown.  Respiratory: Negative for cough, shortness of breath, and wheezing.  HENT: Negative for ear pain, nosebleeds, and tinnitus.  Gastrointestinal: Negative for abdominal pain, constipation, diarrhea, nausea and vomiting.   Genitourinary: No urinary symptoms.  Musculoskeletal: Negative for muscle cramps.  Neurological: Negative for dizziness, headaches, loss of balance, numbness, and symptoms of stroke.  Psychiatric: Normal mental status.     All other systems reviewed and are negative.        Objective:     /60 (BP Location: Left arm, Patient Position: Sitting, Cuff Size: Adult)   Pulse 60   Ht 170.2 cm (67\")   Wt 94.3 kg (208 lb)   SpO2 97%   BMI 32.58 kg/m²      Physical Exam  Constitutional: Patient appears well-developed and well-nourished.   HENT: HEENT exam unremarkable.   Neck: Neck supple. No JVD present. No carotid bruits.   Cardiovascular: Normal rate, regular rhythm and normal heart sounds. No murmur heard.   2+ symmetric pulses.   Pulmonary/Chest: Breath sounds normal. Does not exhibit tenderness.   Abdominal: Abdomen benign.   Musculoskeletal: Does not exhibit edema.   Neurological: Neurological exam unremarkable.   Vitals reviewed.    Data Review:   Lab Results   Component Value Date    GLUCOSE 99 04/02/2021    BUN 15 04/02/2021    CREATININE 0.92 04/02/2021    EGFRIFNONA 81 04/02/2021    BCR 16.3 04/02/2021    K 4.5 04/02/2021    CO2 23.0 04/02/2021    CALCIUM 9.1 04/02/2021    ALBUMIN 4.10 03/31/2021    AST 24 03/31/2021    ALT 33 03/31/2021     Lab Results   Component Value Date "    CHOL 208 (H) 03/31/2021    TRIG 153 (H) 03/31/2021    HDL 41 03/31/2021     (H) 03/31/2021      Lab Results   Component Value Date    WBC 7.07 04/02/2021    RBC 5.84 (H) 04/02/2021    HGB 16.3 04/02/2021    HCT 51.2 (H) 04/02/2021    MCV 87.7 04/02/2021     04/02/2021     Lab Results   Component Value Date    HGBA1C 6.31 (H) 03/31/2021        Procedures     Manual device interrogation, 10/29/21:   Normal, well-functioning [employer] PPM with 9 years of battery life remaining.   Events: 1 NSVT 3.5 sec @ 169 bpm  Device updates: None           Assessment:      Diagnosis Plan   1. Sick sinus syndrome (HCC), status post PPM with normal pacemaker function. Schedule PPM revision with Dr. Jaramillo in next 2 weeks to implant device deeper into muscle    2. Dyslipidemia  Monitored by PCP, continue diet and exercise.     Plan:   Referral to Dr. Jaramillo for pacemaker revision within next 2 weeks to implant device deeper, due to the device being close to surface there is risk of skin erosion and [place] recommended that he should stay and get this taken care of before he leaves.  I have personally discussed with Dr. Jaramillo and his staff and they will be scheduling him in the near future.  Continue current medications.   FU with Dr. Jaramillo per his discretion, follow-up with me in 6 MO, sooner as needed.  Thank you for allowing us to participate in the care of your patient.     Scribed for Valentina Saini MD by Alexia Rondon. 10/29/2021 10:31 EDT    I, Valentina Saini MD, personally performed the services described in this documentation as scribed by the above named individual in my presence, and it is both accurate and complete.  10/29/2021  16:17 EDT        Please note that portions of this note may have been completed with a voice recognition program. Efforts were made to edit the dictations, but occasionally words are mistranscribed.

## 2021-10-29 ENCOUNTER — HOSPITAL ENCOUNTER (OUTPATIENT)
Dept: RADIATION ONCOLOGY | Facility: HOSPITAL | Age: 71
Discharge: HOME | End: 2021-10-29

## 2021-10-29 ENCOUNTER — OFFICE VISIT (OUTPATIENT)
Dept: CARDIOLOGY | Facility: CLINIC | Age: 71
End: 2021-10-29

## 2021-10-29 VITALS
BODY MASS INDEX: 32.65 KG/M2 | HEIGHT: 67 IN | SYSTOLIC BLOOD PRESSURE: 112 MMHG | WEIGHT: 208 LBS | OXYGEN SATURATION: 97 % | HEART RATE: 60 BPM | DIASTOLIC BLOOD PRESSURE: 60 MMHG

## 2021-10-29 DIAGNOSIS — I49.5 SICK SINUS SYNDROME (HCC): Primary | ICD-10-CM

## 2021-10-29 DIAGNOSIS — E78.5 DYSLIPIDEMIA: ICD-10-CM

## 2021-10-29 DIAGNOSIS — T82.897S EROSION OF PACEMAKER POCKET DUE TO AND NOT CONCURRENT WITH IMPLANTATION OF CARDIAC PACEMAKER: Primary | ICD-10-CM

## 2021-10-29 PROCEDURE — 99214 OFFICE O/P EST MOD 30 MIN: CPT | Performed by: INTERNAL MEDICINE

## 2021-10-29 PROCEDURE — 77385: CPT | Performed by: RADIOLOGY

## 2021-10-29 PROCEDURE — 77336 RADIATION PHYSICS CONSULT: CPT | Performed by: RADIOLOGY

## 2021-10-29 PROCEDURE — 93288 INTERROG EVL PM/LDLS PM IP: CPT | Performed by: INTERNAL MEDICINE

## 2021-11-01 ENCOUNTER — HOSPITAL ENCOUNTER (OUTPATIENT)
Facility: HOSPITAL | Age: 71
Discharge: HOME | End: 2021-11-01
Attending: INTERNAL MEDICINE | Admitting: INTERNAL MEDICINE

## 2021-11-01 ENCOUNTER — HOSPITAL ENCOUNTER (OUTPATIENT)
Dept: RADIATION ONCOLOGY | Facility: HOSPITAL | Age: 71
Setting detail: RADIATION/ONCOLOGY SERIES
Discharge: HOME | End: 2021-11-01

## 2021-11-01 ENCOUNTER — HOSPITAL ENCOUNTER (OUTPATIENT)
Dept: RADIATION ONCOLOGY | Facility: HOSPITAL | Age: 71
Discharge: HOME | End: 2021-11-01

## 2021-11-01 VITALS
HEART RATE: 60 BPM | BODY MASS INDEX: 32.71 KG/M2 | DIASTOLIC BLOOD PRESSURE: 75 MMHG | HEIGHT: 67 IN | SYSTOLIC BLOOD PRESSURE: 157 MMHG | OXYGEN SATURATION: 97 % | TEMPERATURE: 97.6 F | WEIGHT: 208.4 LBS | RESPIRATION RATE: 18 BRPM

## 2021-11-01 DIAGNOSIS — T82.897S EROSION OF PACEMAKER POCKET DUE TO AND NOT CONCURRENT WITH IMPLANTATION OF CARDIAC PACEMAKER: ICD-10-CM

## 2021-11-01 LAB
ANION GAP SERPL CALCULATED.3IONS-SCNC: 8 MMOL/L (ref 5–15)
BUN SERPL-MCNC: 13 MG/DL (ref 8–23)
BUN/CREAT SERPL: 13.3 (ref 7–25)
CALCIUM SPEC-SCNC: 9.9 MG/DL (ref 8.6–10.5)
CHLORIDE SERPL-SCNC: 102 MMOL/L (ref 98–107)
CO2 SERPL-SCNC: 26 MMOL/L (ref 22–29)
CREAT SERPL-MCNC: 0.98 MG/DL (ref 0.76–1.27)
DEPRECATED RDW RBC AUTO: 44 FL (ref 37–54)
ERYTHROCYTE [DISTWIDTH] IN BLOOD BY AUTOMATED COUNT: 13.7 % (ref 12.3–15.4)
GFR SERPL CREATININE-BSD FRML MDRD: 75 ML/MIN/1.73
GLUCOSE SERPL-MCNC: 108 MG/DL (ref 65–99)
HBA1C MFR BLD: 5.8 % (ref 4.8–5.6)
HCT VFR BLD AUTO: 46.3 % (ref 37.5–51)
HGB BLD-MCNC: 14.8 G/DL (ref 13–17.7)
MCH RBC QN AUTO: 28.4 PG (ref 26.6–33)
MCHC RBC AUTO-ENTMCNC: 32 G/DL (ref 31.5–35.7)
MCV RBC AUTO: 88.7 FL (ref 79–97)
PLATELET # BLD AUTO: 179 10*3/MM3 (ref 140–450)
PMV BLD AUTO: 9.7 FL (ref 6–12)
POTASSIUM SERPL-SCNC: 4.2 MMOL/L (ref 3.5–5.2)
RBC # BLD AUTO: 5.22 10*6/MM3 (ref 4.14–5.8)
SODIUM SERPL-SCNC: 136 MMOL/L (ref 136–145)
WBC # BLD AUTO: 4.19 10*3/MM3 (ref 3.4–10.8)

## 2021-11-01 PROCEDURE — 99153 MOD SED SAME PHYS/QHP EA: CPT | Performed by: INTERNAL MEDICINE

## 2021-11-01 PROCEDURE — 33222 RELOCATION POCKET PACEMAKER: CPT | Performed by: INTERNAL MEDICINE

## 2021-11-01 PROCEDURE — 25010000002 FENTANYL CITRATE (PF) 50 MCG/ML SOLUTION: Performed by: INTERNAL MEDICINE

## 2021-11-01 PROCEDURE — 25010000002 ONDANSETRON PER 1 MG: Performed by: INTERNAL MEDICINE

## 2021-11-01 PROCEDURE — 99152 MOD SED SAME PHYS/QHP 5/>YRS: CPT | Performed by: INTERNAL MEDICINE

## 2021-11-01 PROCEDURE — 77385: CPT | Performed by: RADIOLOGY

## 2021-11-01 PROCEDURE — 83036 HEMOGLOBIN GLYCOSYLATED A1C: CPT | Performed by: NURSE PRACTITIONER

## 2021-11-01 PROCEDURE — 0 LIDOCAINE 1 % SOLUTION: Performed by: INTERNAL MEDICINE

## 2021-11-01 PROCEDURE — 0 CEFAZOLIN IN DEXTROSE 2-4 GM/100ML-% SOLUTION: Performed by: NURSE PRACTITIONER

## 2021-11-01 PROCEDURE — 85027 COMPLETE CBC AUTOMATED: CPT | Performed by: INTERNAL MEDICINE

## 2021-11-01 PROCEDURE — 25010000002 MIDAZOLAM PER 1 MG: Performed by: INTERNAL MEDICINE

## 2021-11-01 PROCEDURE — 80048 BASIC METABOLIC PNL TOTAL CA: CPT | Performed by: INTERNAL MEDICINE

## 2021-11-01 PROCEDURE — 33999 UNLISTED PX CARDIAC SURGERY: CPT | Performed by: INTERNAL MEDICINE

## 2021-11-01 RX ORDER — BUPIVACAINE HYDROCHLORIDE 5 MG/ML
INJECTION, SOLUTION PERINEURAL AS NEEDED
Status: DISCONTINUED | OUTPATIENT
Start: 2021-11-01 | End: 2021-11-01 | Stop reason: HOSPADM

## 2021-11-01 RX ORDER — ONDANSETRON 2 MG/ML
4 INJECTION INTRAMUSCULAR; INTRAVENOUS EVERY 6 HOURS PRN
Status: DISCONTINUED | OUTPATIENT
Start: 2021-11-01 | End: 2021-11-01 | Stop reason: HOSPADM

## 2021-11-01 RX ORDER — HYDROCODONE BITARTRATE AND ACETAMINOPHEN 5; 325 MG/1; MG/1
1 TABLET ORAL EVERY 4 HOURS PRN
Status: DISCONTINUED | OUTPATIENT
Start: 2021-11-01 | End: 2021-11-01 | Stop reason: HOSPADM

## 2021-11-01 RX ORDER — ACETAMINOPHEN 325 MG/1
650 TABLET ORAL EVERY 4 HOURS PRN
Status: DISCONTINUED | OUTPATIENT
Start: 2021-11-01 | End: 2021-11-01 | Stop reason: HOSPADM

## 2021-11-01 RX ORDER — CEFAZOLIN SODIUM 2 G/100ML
2 INJECTION, SOLUTION INTRAVENOUS ONCE
Status: COMPLETED | OUTPATIENT
Start: 2021-11-01 | End: 2021-11-01

## 2021-11-01 RX ORDER — NITROGLYCERIN 0.4 MG/1
0.4 TABLET SUBLINGUAL
Status: DISCONTINUED | OUTPATIENT
Start: 2021-11-01 | End: 2021-11-01 | Stop reason: HOSPADM

## 2021-11-01 RX ORDER — LIDOCAINE HYDROCHLORIDE 10 MG/ML
INJECTION, SOLUTION INFILTRATION; PERINEURAL AS NEEDED
Status: DISCONTINUED | OUTPATIENT
Start: 2021-11-01 | End: 2021-11-01 | Stop reason: HOSPADM

## 2021-11-01 RX ORDER — MIDAZOLAM HYDROCHLORIDE 1 MG/ML
INJECTION INTRAMUSCULAR; INTRAVENOUS AS NEEDED
Status: DISCONTINUED | OUTPATIENT
Start: 2021-11-01 | End: 2021-11-01 | Stop reason: HOSPADM

## 2021-11-01 RX ORDER — ACETAMINOPHEN 650 MG/1
650 SUPPOSITORY RECTAL EVERY 4 HOURS PRN
Status: DISCONTINUED | OUTPATIENT
Start: 2021-11-01 | End: 2021-11-01 | Stop reason: HOSPADM

## 2021-11-01 RX ORDER — FENTANYL CITRATE 50 UG/ML
INJECTION, SOLUTION INTRAMUSCULAR; INTRAVENOUS AS NEEDED
Status: DISCONTINUED | OUTPATIENT
Start: 2021-11-01 | End: 2021-11-01 | Stop reason: HOSPADM

## 2021-11-01 RX ORDER — SODIUM CHLORIDE 0.9 % (FLUSH) 0.9 %
1-10 SYRINGE (ML) INJECTION AS NEEDED
Status: DISCONTINUED | OUTPATIENT
Start: 2021-11-01 | End: 2021-11-01 | Stop reason: HOSPADM

## 2021-11-01 RX ORDER — SODIUM CHLORIDE 0.9 % (FLUSH) 0.9 %
3 SYRINGE (ML) INJECTION EVERY 12 HOURS SCHEDULED
Status: DISCONTINUED | OUTPATIENT
Start: 2021-11-01 | End: 2021-11-01 | Stop reason: HOSPADM

## 2021-11-01 RX ORDER — SODIUM CHLORIDE 0.9 % (FLUSH) 0.9 %
10 SYRINGE (ML) INJECTION AS NEEDED
Status: DISCONTINUED | OUTPATIENT
Start: 2021-11-01 | End: 2021-11-01 | Stop reason: HOSPADM

## 2021-11-01 RX ORDER — SODIUM CHLORIDE 9 MG/ML
INJECTION, SOLUTION INTRAVENOUS CONTINUOUS PRN
Status: DISCONTINUED | OUTPATIENT
Start: 2021-11-01 | End: 2021-11-01 | Stop reason: HOSPADM

## 2021-11-01 RX ORDER — ONDANSETRON 2 MG/ML
INJECTION INTRAMUSCULAR; INTRAVENOUS AS NEEDED
Status: DISCONTINUED | OUTPATIENT
Start: 2021-11-01 | End: 2021-11-01 | Stop reason: HOSPADM

## 2021-11-01 RX ADMIN — CEFAZOLIN SODIUM 2 G: 2 INJECTION, SOLUTION INTRAVENOUS at 16:09

## 2021-11-01 NOTE — H&P
Cardiology Consult/H&P     Jeb Russo  1950  [phone]  [phone] (work)    11/01/21    DATE OF ADMISSION: 11/1/2021  [facility] Jeff Meade MD  [address] [facility] [address] [facility] [address] [address] / [facility] [address]    CC: SSS with [facility] PPM     PROBLEM LIST:  Sick Sinus Rhythm  Episode of Syncope 2006  Subsequent extensive cardiac evaluation including negative cardiac catheterization study.   [facility] dual-chamber pacemaker implant in 2006  PM generator change by Dr. Saini, 4/2012  PM generator change by Dr. Saini, 4/5/2021  Dyslipidemia, not on any treatment.  Prostate cancer, s/p prostatectomy and radiation treatment  Obesity, BMI 30  Surgical history:   Prostatectomy   Knee surgery/ ACL repair (remote past)      History of Present Illness: Mr. Russo is a pleasant 71-year-old male referred by Dr. Saini for pacemaker pocket revision.  Patient has  A history of  sick sinus syndrome associated with syncope patient had a dual-chamber [employer] maker placed in 2006.  He has had subsequent generator changes in 2012 and more recently in April 2021 by Dr. Cruz.  Following this most recent pacemaker generator change patient has noted more superficial position of his pacemaker on his chest.  Patient also notes some discomfort and irritation at the site since the April 2021 generator change.   He denies any redness, drainage, swelling.  No fevers, chills other signs or symptoms of infection.    Of note the patient is currently undergoing radiation treatments for prostate cancer.  No other ongoing medical issues at this time.      No Known Allergies    Prior to Admission Medications       Prescriptions Last Dose Informant Patient Reported? Taking?    B Complex Vitamins (VITAMIN B COMPLEX PO)   Yes No    Take  by mouth Every Other Day.    Multiple Vitamins-Minerals (MULTIVITAMIN ADULT PO)  Self Yes No    Take 15 mg by mouth Daily.    vitamin D3 125 MCG (5000 UT) capsule capsule  Self  Yes No    Take 5,000 Units by mouth Daily.    Zinc 30 MG tablet  Self Yes No    Take 30 mg by mouth Daily.              Current Facility-Administered Medications:     acetaminophen (TYLENOL) tablet 650 mg, 650 mg, Oral, Q4H PRN, Clementine Messina APRN    ceFAZolin in dextrose (ANCEF) IVPB solution 2 g, 2 g, Intravenous, Once, Clementine Messina APRN    nitroglycerin (NITROSTAT) SL tablet 0.4 mg, 0.4 mg, Sublingual, Q5 Min PRN, Clementine Messina APRN    ondansetron (ZOFRAN) injection 4 mg, 4 mg, Intravenous, Q6H PRN, Clementine Messina APRN    sodium chloride 0.9 % flush 1-10 mL, 1-10 mL, Intravenous, PRN, Clementine Messina APRN    sodium chloride 0.9 % flush 3 mL, 3 mL, Intravenous, Q12H, Clementine Messina APRN    Social History     Socioeconomic History    Marital status:    Tobacco Use    Smoking status: Former Smoker     Packs/day: 0.50     Years: 1.00     Pack years: 0.50     Types: Cigarettes     Quit date:      Years since quittin.8    Smokeless tobacco: Never Used   Substance and Sexual Activity    Alcohol use: Yes     Alcohol/week: 1.0 standard drink     Types: 1 Glasses of wine per week     Comment: weekly    Drug use: No    Sexual activity: Defer       Family History   Problem Relation Age of Onset    Thyroid disease Mother     Thyroid disease Father        REVIEW OF SYSTEMS:   CONST:  No weight loss, fever, chills, weakness or + fatigue.   HEENT:  No visual loss, blurred vision, double vision, yellow sclerae.                   No hearing loss, congestion, sore throat.   SKIN:      No rashes, urticaria, ulcers, sores.     RESP:     No shortness of breath, hemoptysis, cough, sputum.   GI:           No anorexia, nausea, vomiting, diarrhea. No abdominal pain, melena.   :         No burning on urination, hematuria or increased frequency.  ENDO:    No diaphoresis, cold or heat intolerance. No polyuria or polydipsia.   NEURO:  No headache, dizziness, syncope, paralysis, ataxia, or parasthesias.               "    No change in bowel or bladder control. No history of CVA/TIA  MUSC:    No muscle, back pain, joint pain or stiffness.   HEME:    No anemia, bleeding, bruising. No history of DVT/PE.  PSYCH:  No history of depression, anxiety    Vitals:    11/01/21 1404 11/01/21 1405   BP: 125/79 125/69   BP Location: Right arm Left arm   Pulse: 60    Temp: 97.6 °F (36.4 °C)    TempSrc: Tympanic    Weight: 94.5 kg (208 lb 6.4 oz)    Height: 170.2 cm (67\")          Vital Sign Min/Max for last 24 hours  Temp  Min: 97.6 °F (36.4 °C)  Max: 97.6 °F (36.4 °C)   BP  Min: 125/69  Max: 125/69   Pulse  Min: 60  Max: 60   No data recorded   No data recorded   No data recorded    No intake or output data in the 24 hours ending 11/01/21 1409          Physical Exam:  GEN: Well nourished, Well- developed  No acute distress  HEENT: Normocephalic, Atraumatic, PERRLA, moist mucous membranes  NECK: supple, NO JVD, no thyromegaly, no lymphadenopathy  CARD: S1S2  RRR no murmur, gallop, rub  LUNGS: Clear to auscultataion, normal respiratory effort  ABDOMEN: Soft, nontender, normal bowel sounds  EXTREMITIES:No gross deformities,  No clubbing, cyanosis, or edema  SKIN: Warm, dry-left subclavicular pacemaker site noted.  Header and leads palpable.  No redness, drainage, swelling at the site.  NEURO: No focal deficits  PSYCHIATRIC: Normal affect and mood      I personally viewed and interpreted the patient's EKG/Telemetry/lab data    Data:   Laboratory data pending      Telemetry: AP,  60s       Assessment and Plan:   1) Sick sinus syndrome associated with syncope  -Patient is status post BSC PPM placement  Initially in 2006 with generator change in 2012 and more recently in  April 2021.  The device is seated  superficially and he  has  some irritation/ discomfort at the pocket site. Given the bothersome sensation especially when he is sleeping due to superficial location, He would prefer to have pocket revision . The device  Will be moved to a " submuscular location. Dr. Jaramillo has reviewed the risk, benefits, alternatives the procedure with the patient in detail.  The risks, benefits, and alternatives of the procedure have been reviewed and the patient wishes to proceed.        2) hyperlipidemia: per PCP    3) prostate CA- s/p surgery now undergoing radiation    Electronically signed by EMANI Hughes, 11/01/21, 1:56 PM EDT.    After reviewing the location of the pacemaker and its surrounding tissue, I feel that relocating the subcutaneous pocket is most likely the best option at this time especially from less painful situation in the future.  The patient is agreeable with me and understands the risks and potential complications of the procedure.      I, Anant Jaramillo MD, personally performed the services face to face as described and documented by the above named individual. I have made any necessary edits and it is both accurate and complete 11/8/2021  11:46 EST

## 2021-11-02 ENCOUNTER — HOSPITAL ENCOUNTER (OUTPATIENT)
Dept: RADIATION ONCOLOGY | Facility: HOSPITAL | Age: 71
Discharge: HOME | End: 2021-11-02

## 2021-11-02 VITALS — WEIGHT: 214 LBS | BODY MASS INDEX: 33.52 KG/M2

## 2021-11-02 PROCEDURE — 77385: CPT | Performed by: RADIOLOGY

## 2021-11-03 ENCOUNTER — HOSPITAL ENCOUNTER (OUTPATIENT)
Dept: RADIATION ONCOLOGY | Facility: HOSPITAL | Age: 71
Discharge: HOME | End: 2021-11-03

## 2021-11-03 PROCEDURE — 77385: CPT | Performed by: RADIOLOGY

## 2021-11-04 ENCOUNTER — HOSPITAL ENCOUNTER (OUTPATIENT)
Dept: RADIATION ONCOLOGY | Facility: HOSPITAL | Age: 71
Discharge: HOME | End: 2021-11-04

## 2021-11-04 PROCEDURE — 77385: CPT | Performed by: RADIOLOGY

## 2021-11-05 ENCOUNTER — OFFICE VISIT (OUTPATIENT)
Dept: CARDIOLOGY | Facility: CLINIC | Age: 71
End: 2021-11-05

## 2021-11-05 ENCOUNTER — HOSPITAL ENCOUNTER (OUTPATIENT)
Dept: RADIATION ONCOLOGY | Facility: HOSPITAL | Age: 71
Discharge: HOME | End: 2021-11-05

## 2021-11-05 DIAGNOSIS — Z95.0 CARDIAC PACEMAKER IN SITU: Primary | ICD-10-CM

## 2021-11-05 PROCEDURE — 77385: CPT | Performed by: RADIOLOGY

## 2021-11-05 PROCEDURE — 77336 RADIATION PHYSICS CONSULT: CPT | Performed by: RADIOLOGY

## 2021-11-05 PROCEDURE — 99024 POSTOP FOLLOW-UP VISIT: CPT | Performed by: INTERNAL MEDICINE

## 2021-11-05 NOTE — PROGRESS NOTES
WOUND CHECK    2021    Jeb Russo, : 1950    WOUND CHECK    B/P:  (Sitting) (Standing)     Pulse:     Patient has fever: [] Temperature if indicated: Patient denies fever.    Wound Location: Left infraclavicular  Dressing Removed [x]        Old Dressing Appearance:  Clean, dry []                 Old, bloody drainage [x]                            Moist, serous drainage []                Moist, thick yellow/green drainage []       Wound Appearance: Redness []                  Drainage []                  Culture obtained []        Color:      Consistency:      Amount:          Gloves used, wound cleansed with sterile 4x4 and peroxide [x]       MD notified [] MD orders:     Antibiotic started []  If checked, type  Other: Francia Perry PAC assessed the patient. Old dressing and steri-strips were removed, incision area cleaned with peroxide. The incision was not completely closed. Francia instructed me to re-steri-strip the incision and place gauze and tegaderm back over the wound. He is coming back on Tuesday for a f/u wound check.    Appointment for follow-up scheduled for 3 months post procedure [x]    Future Appointments   Date Time Provider Department Center   2021 11:30 AM WOUND CHECK [facility]   2/3/2022  1:00 PM Leland Trejo PA [facility]   2022 12:30 PM aVlentina Saini MD [facility]           Kelly Hi RN, 21      MD Signature:______________________________ Completed By/Date:

## 2021-11-08 ENCOUNTER — HOSPITAL ENCOUNTER (OUTPATIENT)
Dept: RADIATION ONCOLOGY | Facility: HOSPITAL | Age: 71
Discharge: HOME | End: 2021-11-08

## 2021-11-08 PROCEDURE — 77385: CPT | Performed by: RADIOLOGY

## 2021-11-09 ENCOUNTER — OFFICE VISIT (OUTPATIENT)
Dept: CARDIOLOGY | Facility: CLINIC | Age: 71
End: 2021-11-09

## 2021-11-09 ENCOUNTER — HOSPITAL ENCOUNTER (OUTPATIENT)
Dept: RADIATION ONCOLOGY | Facility: HOSPITAL | Age: 71
Discharge: HOME | End: 2021-11-09

## 2021-11-09 VITALS — WEIGHT: 209.9 LBS | BODY MASS INDEX: 32.87 KG/M2

## 2021-11-09 DIAGNOSIS — T82.897S EROSION OF PACEMAKER POCKET DUE TO AND NOT CONCURRENT WITH IMPLANTATION OF CARDIAC PACEMAKER: Primary | ICD-10-CM

## 2021-11-09 PROCEDURE — 77385: CPT | Performed by: RADIOLOGY

## 2021-11-09 PROCEDURE — 99024 POSTOP FOLLOW-UP VISIT: CPT | Performed by: INTERNAL MEDICINE

## 2021-11-09 NOTE — PROGRESS NOTES
2021    Jeb Russo, : 1950    WOUND CHECK    B/P:     Pulse:     Patient has fever: [] Temperature if indicated:     Wound Location: Left Infraclavicular    Dressing Removed [x]        Old Dressing Appearance:  Clean, dry [x]                 Old, bloody drainage []                            Moist, serous drainage []                Moist, thick yellow/green drainage []       Wound Appearance: Redness []                  Drainage []                  Culture obtained []        Color: Clear and N/A     Consistency: N/A     Amount: none         Gloves used, wound cleansed with sterile 4x4 and peroxide [x]       MD notified [] MD orders:   Antibiotic started []  If checked, type   Other:   Appointment for follow-up scheduled for 3 months post procedure []    Future Appointments   Date Time Provider Department Center   2021 11:30 AM WOUND CHECK [facility]   2/3/2022  1:00 PM Leland Trejo PA [facility]   2022 12:30 PM Valentina Saini MD [facility]           Anna Finch MA, 21      MD Signature:______________________________ Completed By/Date:

## 2021-11-10 ENCOUNTER — HOSPITAL ENCOUNTER (OUTPATIENT)
Dept: RADIATION ONCOLOGY | Facility: HOSPITAL | Age: 71
Discharge: HOME | End: 2021-11-10

## 2021-11-10 PROCEDURE — 77385: CPT | Performed by: RADIOLOGY

## 2021-11-11 ENCOUNTER — HOSPITAL ENCOUNTER (OUTPATIENT)
Dept: RADIATION ONCOLOGY | Facility: HOSPITAL | Age: 71
Discharge: HOME | End: 2021-11-11

## 2021-11-11 PROCEDURE — 77385: CPT | Performed by: RADIOLOGY

## 2021-11-12 ENCOUNTER — HOSPITAL ENCOUNTER (OUTPATIENT)
Dept: RADIATION ONCOLOGY | Facility: HOSPITAL | Age: 71
Discharge: HOME | End: 2021-11-12

## 2021-11-12 DIAGNOSIS — C61 PROSTATE CANCER (HCC): Primary | ICD-10-CM

## 2021-11-12 PROCEDURE — 77385: CPT | Performed by: RADIOLOGY

## 2021-11-14 PROCEDURE — U0004 COV-19 TEST NON-CDC HGH THRU: HCPCS | Performed by: PERSONAL EMERGENCY RESPONSE ATTENDANT

## 2021-11-14 PROCEDURE — U0005 INFEC AGEN DETEC AMPLI PROBE: HCPCS | Performed by: PERSONAL EMERGENCY RESPONSE ATTENDANT

## 2021-11-15 ENCOUNTER — TELEPHONE (OUTPATIENT)
Dept: URGENT CARE | Facility: CLINIC | Age: 71
End: 2021-11-15

## 2021-11-15 NOTE — TELEPHONE ENCOUNTER
----- Message from Sidney Ceron MD sent at 11/15/2021 12:25 PM EST -----  Please inform patient of negative Covid test    ----- Message -----  From: Lab, Background User  Sent: 11/15/2021  12:18 PM EST  To: [facility] Riley Rios Covid Results

## 2021-11-23 NOTE — RADIATION COMPLETION NOTES
RADIATION ONCOLOGY COMPLETION NOTE    PATIENT:   Jeb Russo  MEDICAL RECORD:  [id number]  :    1950  COMPLETION DATE: 2021  DIAGNOSIS:   Prostate cancer  Stage BENNIE (pT2, pN1, cM0, PSA: 5, Grade Group: 2)      BRIEF HISTORY:  This 71 y.o. patient completed radiotherapy.  He has biochemical recurrence of prostate cancer.    He is status post robotic prostatectomy with lymph node dissection 2015.  He had Khadijah's 3+4 = 7, pathologic stage Bennie (T2, and 1, M0).  He had 2 positive left external iliac lymph nodes containing subcentimeter metastatic deposits.  6 years later he has biochemical recurrence with no obvious source of disease on conventional imaging studies.  He initiated androgen ablation 5 months ago and has already had a very good biochemical response with PSA decreasing from 2.08 ng/ml on 2021 to a value of 0.12 ng/ml.    This does indicate that he still has hormone sensitive disease.  Salvage pelvic irradiation was given with the hopes of achieving long-term disease-free survival and elimination of long-term androgen suppression.    TREATMENT COURSE:  The prostate bed received a dose of 64 Gray delivered in 30 daily fractions of 2 Gray using 6 MV photons with IMRT helical Curt therapy technique and image guidance.  Simultaneously, the pelvic lymphatics received a minimum dose of 52.8 Jaimes.    DATES OF TREATMENT: 2021 through 2021    TOLERANCE:   excellent and no unexpected difficulties     STATUS:  no evidence of disease recurrence    DISPOSITION:  Follow up in Radiation Oncology in approximately 1 month.        Niraj Otoole MD

## 2021-12-17 ENCOUNTER — TELEPHONE (OUTPATIENT)
Dept: RADIATION ONCOLOGY | Facility: HOSPITAL | Age: 71
End: 2021-12-17

## 2021-12-17 ENCOUNTER — HOSPITAL ENCOUNTER (OUTPATIENT)
Dept: RADIATION ONCOLOGY | Facility: HOSPITAL | Age: 71
Setting detail: RADIATION/ONCOLOGY SERIES
Discharge: HOME | End: 2021-12-17

## 2021-12-17 NOTE — TELEPHONE ENCOUNTER
Patient is now 1 month status post definitive salvage pelvic irradiation for biochemical recurrence of prostate cancer nearly 6 years after robotic prostatectomy with lymph node dissection.  [place] unable to communicate via telephone or audio/visual media to conduct 1 month follow-up visit.  The patient left voicemail on the  line stating that he felt well, with no pressing symptoms or issues following treatment.  A copy of the care plan has been mailed to [address] [place] [address], [place] [address] listed as the patient's residence.  A copy has also been sent electronically to Stormy Arauz, and the patient's nephew Dr. Angel.  The careplan outlines recommendations for repeat PSA 3 months following the completion of radiation, and the patient is already scheduled to return to our clinic in June 2021 for routine follow-up visit.

## 2022-05-20 ENCOUNTER — HOSPITAL ENCOUNTER (OUTPATIENT)
Dept: RADIATION ONCOLOGY | Facility: HOSPITAL | Age: 72
Setting detail: RADIATION/ONCOLOGY SERIES
Discharge: HOME | End: 2022-05-20

## 2022-06-16 ENCOUNTER — HOSPITAL ENCOUNTER (OUTPATIENT)
Dept: RADIATION ONCOLOGY | Facility: HOSPITAL | Age: 72
Setting detail: RADIATION/ONCOLOGY SERIES
Discharge: HOME | End: 2022-06-16

## 2022-06-16 ENCOUNTER — OFFICE VISIT (OUTPATIENT)
Dept: RADIATION ONCOLOGY | Facility: HOSPITAL | Age: 72
End: 2022-06-16

## 2022-06-16 DIAGNOSIS — C61 PROSTATE CANCER: Primary | ICD-10-CM

## 2022-06-16 NOTE — PROGRESS NOTES
Telehealth visit with [place] the [place].  His sister was present in exam room.  He has a history of high risk prostate cancer status post prostatectomy 2/4/2015.  Lymph nodes were involved by metastatic disease.  He developed rising PSA up to maximum value 2.08 ng/ml on 4/13/2021.    Conventional imaging studies were negative.  He initiated androgen ablation and PSA decreased to 0.12 ng/ml, indicating he still has hormone sensitive disease.  He did not tolerate androgen ablation well, and he wished to discontinue hormonal therapy..  He completed a course of salvage pelvic irradiation on 11/12/2021.  He tolerated the treatment well and has maintained very good bowel and bladder function.  He currently has no complaints.  Unfortunately, PSA increased on 1.46 ng/ml on 2/26/2022 and 1.7 ng/ml on 5/28/2002.    Impression: Prostate cancer, regionally metastatic to lymph nodes 7 years status post prostatectomy.  Initially responded to androgen ablation, but wished to discontinue.  7 months status post salvage pelvic irradiation.  PSA is again rising indicating persistent neoplasm either in pelvis or metastatic elsewhere.  I believe this pattern is more consistent with previously undetected metastatic disease outside the radiotherapy treatment field.    Plan: Patient will get another PSA value and testosterone level within the next 2 months to be drawn locally, [place].  He will call us with results.  If PSA is still detectable and rising, I will order a Pylarify PSMA PET scan evaluation to hopefully determine location of tumor since he is now off androgen ablation.  Patient will return to [place], [place] for the imaging study.  If there is localized disease, he may be a candidate for additional localized treatment using conventional or stereotactic technique.  If there is evidence of more widespread metastatic disease or if imaging studies do not detect the  location, systemic treatment options may be more appropriate at some point in the future.  Per patient and family request, I will discuss this plan with his nephew, Dr. Leora nAgel, c [phone], w [phone].

## 2022-06-22 DIAGNOSIS — C61 RECURRENT PROSTATE CANCER: Primary | ICD-10-CM

## 2022-07-20 ENCOUNTER — HOSPITAL ENCOUNTER (OUTPATIENT)
Dept: PET IMAGING | Facility: HOSPITAL | Age: 72
Discharge: HOME | End: 2022-07-20

## 2022-07-20 ENCOUNTER — APPOINTMENT (OUTPATIENT)
Dept: PET IMAGING | Facility: HOSPITAL | Age: 72
End: 2022-07-20

## 2022-07-20 ENCOUNTER — HOSPITAL ENCOUNTER (OUTPATIENT)
Dept: PET IMAGING | Facility: HOSPITAL | Age: 72
End: 2022-07-20

## 2022-07-20 DIAGNOSIS — C61 RECURRENT PROSTATE CANCER: ICD-10-CM

## 2022-07-20 PROCEDURE — 78815 PET IMAGE W/CT SKULL-THIGH: CPT

## 2022-07-20 PROCEDURE — 0 PIFLUFOLASTAT F 18 9 MCI SOLUTION PREFILLED SYRINGE: Performed by: RADIOLOGY

## 2022-07-20 PROCEDURE — A9595 PIFLUFOLASTAT F 18 9 MCI SOLUTION PREFILLED SYRINGE: HCPCS | Performed by: RADIOLOGY

## 2022-07-20 RX ADMIN — PIFLUFOLASTAT F-18 1 DOSE: 80 INJECTION INTRAVENOUS at 13:31

## 2022-07-21 ENCOUNTER — HOSPITAL ENCOUNTER (OUTPATIENT)
Dept: RADIATION ONCOLOGY | Facility: HOSPITAL | Age: 72
Setting detail: RADIATION/ONCOLOGY SERIES
Discharge: HOME | End: 2022-07-21

## 2022-07-21 ENCOUNTER — OFFICE VISIT (OUTPATIENT)
Dept: RADIATION ONCOLOGY | Facility: HOSPITAL | Age: 72
End: 2022-07-21

## 2022-07-21 DIAGNOSIS — C61 PROSTATE CANCER: ICD-10-CM

## 2022-07-21 DIAGNOSIS — C77.2 SECONDARY AND UNSPECIFIED MALIGNANT NEOPLASM OF INTRA-ABDOMINAL LYMPH NODES: Primary | ICD-10-CM

## 2022-07-21 PROCEDURE — G0463 HOSPITAL OUTPT CLINIC VISIT: HCPCS

## 2022-07-21 NOTE — PROGRESS NOTES
RE-EVALUATION    PATIENT:                                                      Jeb Russo  :                                                          1950  DATE:                          2022   DIAGNOSIS:     Prostate cancer (HCC)  - Stage BENNIE (pT2, pN1, cM0, PSA: 5, Grade Group: 2)         BRIEF HISTORY:  The patient is a very pleasant 71 y.o. male  with biochemical recurrence of prostate cancer.  Status post robotic prostatectomy performed 2015 at [facility] in [place], [place] by Dr. Jack Dwyer.  Patient believes his preoperative PSA was around 5 ng/mL.  Pathology showed prostatic adenocarcinoma, Tappan's 3+4 = 7, involving 12% of the prostate gland.    There was presence of perineural invasion.    There was no definite evidence of lymphovascular invasion.  Margins were negative.  Seminal vesicles were not involved.    An extensive lymph node sampling was performed.  2 out of 19 lymph nodes contained metastatic disease.  These lymph nodes were in the left external iliac chain with the largest tumor deposit 9.5 mm.  There was no mention of extracapsular extension.  Patient healed well after surgery and regained full bladder control with excellent performance status.  PSA apparently was undetectable initially.  He did not initially receive adjuvant hormonal therapy or radiotherapy.  Subsequent PSA levels have increased with values:  2017 = 0.1 ng/ml   2019 = 0.221 ng/ml  3/31/2021 = 1.96 ng/ml  2021 = 2.08 ng/ml  2021 = 1.606 ng/ml     On 2021 he underwent restaging imaging studies performed at [facility].  Nuclear medicine bone scan showed no evidence of bony metastasis.  CT scan of chest, abdomen and pelvis showed borderline axillary and mediastinal lymph nodes thought to be possibly related to previous Covid vaccination reaction.  There was no evidence of pathologic lymphadenopathy in the abdomen or pelvis.  There was no suspicious mass in the region of the  prostate bed or elsewhere.     He started androgen ablation with a single 6-month duration LHRH agonist injection and bicalutamide in April 2021.  He had significant hormone withdrawal symptoms of hot flashes, fatigue and impaired libido.  PSA decreased to a value 0.12 ng/ml.    Patient declined additional androgen ablation.  He completed a course of salvage pelvic irradiation 11/12/2021 to a dose of 64 Gray with IMRT using conventional fractionation.  He tolerated the treatment very well and has continued to maintain excellent health.  Unfortunately, PSA increased on 1.46 ng/ml on 2/26/2022 and 1.7 ng/ml on 5/28/2002.    Restaging PSMA PET/CT imaging was performed yesterday, 7/20/2022.  This shows 2 small foci of hypermetabolic lymphadenopathy.  There is an 8 mm left periaortic lymph node in the mid abdomen with SUV 3.6.  There is a slightly smaller left internal iliac lymph node, SUV 2.7, in the upper pelvis just superior to his radiotherapy field.  There is no hypermetabolism within the mid to lower pelvis.  No evidence of bony metastasis or organ involvement.    No Known Allergies    Review of Systems   Genitourinary: Positive for nocturia.    Hematological: Bruises/bleeds easily.           Objective   VITAL SIGNS: There were no vitals filed for this visit.     Karnofsky score: 90       Physical Exam  Vitals and nursing note reviewed.   Constitutional:       Appearance: He is well-developed.   HENT:      Head: Normocephalic and atraumatic.   Cardiovascular:      Rate and Rhythm: Normal rate and regular rhythm.      Heart sounds: No murmur heard.  Pulmonary:      Effort: Pulmonary effort is normal.      Breath sounds: No wheezing or rales.   Chest:   Breasts:      Right: No supraclavicular adenopathy.      Left: No supraclavicular adenopathy.       Abdominal:      General: There is no distension.      Palpations: Abdomen is soft.      Tenderness: There is no abdominal tenderness.   Musculoskeletal:          General: No tenderness. Normal range of motion.      Cervical back: Normal range of motion and neck supple.   Lymphadenopathy:      Cervical: No cervical adenopathy.      Upper Body:      Right upper body: No supraclavicular adenopathy.      Left upper body: No supraclavicular adenopathy.   Skin:     General: Skin is warm and dry.   Neurological:      Mental Status: He is alert and oriented to person, place, and time.      Sensory: No sensory deficit.   Psychiatric:         Behavior: Behavior normal.         Thought Content: Thought content normal.         Judgment: Judgment normal.              The following portions of the patient's history were reviewed and updated as appropriate: allergies, current medications, past family history, past medical history, past social history, past surgical history and problem list.      IMPRESSION: Oligometastatic prostate cancer.  He is 7 years status post prostatectomy.  After brief trial of androgen ablation he discontinued hormonal therapy due to side effects.  He completed salvage pelvic irradiation 8 months ago.  He tolerated that well.  He is currently asymptomatic.  He again has rising PSA value up to 1.7 ng/ml.  Pylarify PSMA PET scan shows 2 hypermetabolic lymph nodes in the left upper internal iliac and left mid periaortic regions.  These are outside the radiotherapy field.  Patient still wishes to avoid androgen ablation or any systemic treatment measures.  The 2 hypermetabolic lymph nodes would be amenable to treatment with stereotactic body radiotherapy with a goal of delaying or avoiding androgen ablation/chemotherapy.  Patient would like to proceed with stereotactic body radiotherapy to the sites.  The CyberKnife treatment procedures been reviewed in detail and informed consent obtained today.    RECOMMENDATIONS: He will return next week for simulation.  A dose of approximately 35 Jaimes will be delivered in 5 fractions, every other day schedule, on the  CyberKnife.       Niraj Otoole MD     Approximately 25 minutes was spent during visit, 20 minutes spent with patient.

## 2022-07-26 ENCOUNTER — HOSPITAL ENCOUNTER (OUTPATIENT)
Dept: RADIATION ONCOLOGY | Facility: HOSPITAL | Age: 72
Discharge: HOME | End: 2022-07-26

## 2022-07-26 PROCEDURE — 77470 SPECIAL RADIATION TREATMENT: CPT | Performed by: RADIOLOGY

## 2022-07-26 PROCEDURE — 77290 THER RAD SIMULAJ FIELD CPLX: CPT | Performed by: RADIOLOGY

## 2022-07-27 ENCOUNTER — TELEPHONE (OUTPATIENT)
Dept: RADIATION ONCOLOGY | Facility: HOSPITAL | Age: 72
End: 2022-07-27

## 2022-07-27 NOTE — TELEPHONE ENCOUNTER
Messaged pt in reply to pt question about expediting treatment.  Explained to pt that Dr. Otoole is aware that he has to travel in mid August and will work on his plan. However, I explained that we have no ability to expedite insurance approval, but our office will call as soon as plan and insurance approval comes through. Pt was SIMed yesterday 7/26/22.

## 2022-07-29 PROCEDURE — 77334 RADIATION TREATMENT AID(S): CPT | Performed by: RADIOLOGY

## 2022-07-29 PROCEDURE — 77295 3-D RADIOTHERAPY PLAN: CPT | Performed by: RADIOLOGY

## 2022-07-29 PROCEDURE — 77370 RADIATION PHYSICS CONSULT: CPT | Performed by: RADIOLOGY

## 2022-07-29 PROCEDURE — 77300 RADIATION THERAPY DOSE PLAN: CPT | Performed by: RADIOLOGY

## 2022-07-29 RX ORDER — ONDANSETRON 4 MG/1
8 TABLET, ORALLY DISINTEGRATING ORAL EVERY 8 HOURS PRN
Status: DISCONTINUED | OUTPATIENT
Start: 2022-07-29 | End: 2022-08-02

## 2022-08-01 ENCOUNTER — LAB (OUTPATIENT)
Dept: LAB | Facility: HOSPITAL | Age: 72
End: 2022-08-01

## 2022-08-01 ENCOUNTER — OFFICE VISIT (OUTPATIENT)
Dept: FAMILY MEDICINE CLINIC | Facility: CLINIC | Age: 72
End: 2022-08-01

## 2022-08-01 ENCOUNTER — HOSPITAL ENCOUNTER (OUTPATIENT)
Dept: RADIATION ONCOLOGY | Facility: HOSPITAL | Age: 72
Setting detail: RADIATION/ONCOLOGY SERIES
Discharge: HOME | End: 2022-08-01

## 2022-08-01 VITALS
DIASTOLIC BLOOD PRESSURE: 70 MMHG | SYSTOLIC BLOOD PRESSURE: 122 MMHG | OXYGEN SATURATION: 99 % | TEMPERATURE: 97.2 F | HEART RATE: 67 BPM | BODY MASS INDEX: 34.53 KG/M2 | WEIGHT: 220.01 LBS | HEIGHT: 67 IN

## 2022-08-01 DIAGNOSIS — I49.5 SICK SINUS SYNDROME: ICD-10-CM

## 2022-08-01 DIAGNOSIS — Z00.00 MEDICARE ANNUAL WELLNESS VISIT, SUBSEQUENT: Primary | ICD-10-CM

## 2022-08-01 DIAGNOSIS — R73.03 PREDIABETES: ICD-10-CM

## 2022-08-01 DIAGNOSIS — Z00.00 MEDICARE ANNUAL WELLNESS VISIT, SUBSEQUENT: ICD-10-CM

## 2022-08-01 DIAGNOSIS — E78.2 MIXED HYPERLIPIDEMIA: ICD-10-CM

## 2022-08-01 DIAGNOSIS — R79.0 ABNORMAL BLOOD LEVEL OF MAGNESIUM: ICD-10-CM

## 2022-08-01 DIAGNOSIS — E66.09 CLASS 1 OBESITY DUE TO EXCESS CALORIES WITH SERIOUS COMORBIDITY AND BODY MASS INDEX (BMI) OF 34.0 TO 34.9 IN ADULT: ICD-10-CM

## 2022-08-01 DIAGNOSIS — C61 PROSTATE CANCER: ICD-10-CM

## 2022-08-01 LAB
DEPRECATED RDW RBC AUTO: 42.7 FL (ref 37–54)
ERYTHROCYTE [DISTWIDTH] IN BLOOD BY AUTOMATED COUNT: 13.5 % (ref 12.3–15.4)
HCT VFR BLD AUTO: 53.4 % (ref 37.5–51)
HGB BLD-MCNC: 17.3 G/DL (ref 13–17.7)
MCH RBC QN AUTO: 28.2 PG (ref 26.6–33)
MCHC RBC AUTO-ENTMCNC: 32.4 G/DL (ref 31.5–35.7)
MCV RBC AUTO: 87 FL (ref 79–97)
PLATELET # BLD AUTO: 242 10*3/MM3 (ref 140–450)
PMV BLD AUTO: 9.8 FL (ref 6–12)
RBC # BLD AUTO: 6.14 10*6/MM3 (ref 4.14–5.8)
WBC NRBC COR # BLD: 6.1 10*3/MM3 (ref 3.4–10.8)

## 2022-08-01 PROCEDURE — 85027 COMPLETE CBC AUTOMATED: CPT

## 2022-08-01 PROCEDURE — 80061 LIPID PANEL: CPT

## 2022-08-01 PROCEDURE — 1170F FXNL STATUS ASSESSED: CPT | Performed by: PHYSICIAN ASSISTANT

## 2022-08-01 PROCEDURE — 82746 ASSAY OF FOLIC ACID SERUM: CPT

## 2022-08-01 PROCEDURE — 1159F MED LIST DOCD IN RCRD: CPT | Performed by: PHYSICIAN ASSISTANT

## 2022-08-01 PROCEDURE — 36415 COLL VENOUS BLD VENIPUNCTURE: CPT

## 2022-08-01 PROCEDURE — 83735 ASSAY OF MAGNESIUM: CPT

## 2022-08-01 PROCEDURE — 80053 COMPREHEN METABOLIC PANEL: CPT

## 2022-08-01 PROCEDURE — 83036 HEMOGLOBIN GLYCOSYLATED A1C: CPT

## 2022-08-01 PROCEDURE — 93000 ELECTROCARDIOGRAM COMPLETE: CPT | Performed by: PHYSICIAN ASSISTANT

## 2022-08-01 PROCEDURE — 82607 VITAMIN B-12: CPT

## 2022-08-01 PROCEDURE — G0439 PPPS, SUBSEQ VISIT: HCPCS | Performed by: PHYSICIAN ASSISTANT

## 2022-08-01 PROCEDURE — 84443 ASSAY THYROID STIM HORMONE: CPT

## 2022-08-01 NOTE — PROGRESS NOTES
The ABCs of the Annual Wellness Visit  Subsequent Medicare Wellness Visit    Chief Complaint   Patient presents with   • Medicare Wellness-subsequent     Sub Medicare Wellness       Subjective   History of Present Illness:  Jeb Russo is a 71 y.o. male who presents for a Subsequent Medicare Wellness Visit.    The patient is a 71-year-old male seen today for a Medicare wellness visit.    The patient reports that he has been here for approximately 2 weeks. He states that he is doing well. The patient reports that he sees a radiation oncologist, Dr. Otoole, and a urologist, Dr. Foley, in [place]. He states that his PSA started going up so he had a PET scan and they found 2 spots which they plan to treat with CyberKnife. He reports that he is waiting for his insurance to respond. He states that he is fairly asymptomatic and is not having a lot of difficulty with urination. He states that he is not prone to getting pneumonia or any lung issues. The patient reports that he has gained a few pounds as he has been eating a lot more. He reports that he started exercising approximately 3 months ago.     The patient reports that he has not eaten anything this morning.    The patient reports that his pacemaker is working well. He states that he gets his pacemaker checked regularly with no problems.    The following portions of the patient's history were reviewed and   updated as appropriate: allergies, past family history, past medical history, past social history, past surgical history and problem list.    Compared to one year ago, the patient feels his physical   health is the same.    Compared to one year ago, the patient feels his mental   health is the same.    Recent Hospitalizations:  He was not admitted to the hospital during the last year.       Current Medical Providers:  Patient Care Team:  Jeff Phan MD as PCP - General (Family Medicine)  Julián Foley MD as Referring Physician  "(Urology)  Valentina Saini MD as Consulting Physician (Cardiology)  Elmo Modi MD as Consulting Physician (Gastroenterology)  Leora Angel MD Lau, Clayton, MD Beckman, Alan C., MD as Consulting Physician (Radiation Oncology)    Outpatient Medications Prior to Visit   Medication Sig Dispense Refill   • B Complex Vitamins (VITAMIN B COMPLEX PO) Take  by mouth Every Other Day.     • COLLAGEN PO Take  by mouth.     • Multiple Vitamins-Minerals (MULTIVITAMIN ADULT PO) Take 15 mg by mouth Daily.     • vitamin D3 125 MCG (5000 UT) capsule capsule Take 5,000 Units by mouth Daily.     • Zinc 30 MG tablet Take 30 mg by mouth Daily.       Facility-Administered Medications Prior to Visit   Medication Dose Route Frequency Provider Last Rate Last Admin   • ondansetron ODT (ZOFRAN-ODT) disintegrating tablet 8 mg  8 mg Oral Q8H PRN Niraj Otoole MD           No opioid medication identified on active medication list. I have reviewed chart for other potential  high risk medication/s and harmful drug interactions in the elderly.          Aspirin is not on active medication list.  Aspirin use is not indicated based on review of current medical condition/s. Risk of harm outweighs potential benefits.  .    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 Recommendations include: nutrition counseling    Does the patient have evidence of cognitive impairment? No    Physical Exam            HEALTH RISK ASSESSMENT    Smoking Status:  Social History     Tobacco Use   Smoking Status Former Smoker   • Packs/day: 0.50   • Years: 1.00   • Pack years: 0.50   • Types: Cigarettes   • Quit date:    • Years since quittin.5   Smokeless Tobacco Never Used     Alcohol Consumption:  Social History     Substance and Sexual Activity   Alcohol Use Yes   • Alcohol/week: 1.0 standard drink   • Types: 1 Glasses of wine per week    Comment: weekly     Fall Risk Screen:    STEADI Fall Risk Assessment was completed, and patient is at LOW risk for falls.Assessment completed on:2022    Depression Screening:  PHQ-2/PHQ-9 Depression Screening 2022   Little Interest or Pleasure in Doing Things 0-->not at all   Feeling Down, Depressed or Hopeless 0-->not at all   PHQ-9: Brief Depression Severity Measure Score 0       Health Habits and Functional and Cognitive Screening:  Functional & Cognitive Status 2022   Do you have difficulty preparing food and eating? No   Do you have difficulty bathing yourself, getting dressed or grooming yourself? No   Do you have difficulty using the toilet? No   Do you have difficulty moving around from place to place? No   Do you have trouble with steps or getting out of a bed or a chair? No   Current Diet Well Balanced Diet   Dental Exam Up to date   Eye Exam Up to date   Exercise (times per week) 0 times per week   Current Exercises Include No Regular Exercise   Current Exercise Activities Include -   Do you need help using the phone?  No   Are you deaf or do you have serious difficulty hearing?  No   Do you need help with transportation? No   Do you need help shopping? No   Do you need help preparing meals?  No   Do you need help with housework?  No   Do you need help with laundry? No   Do you need help taking your medications?  No   Do you need help managing money? No   Do you ever drive or ride in a car without wearing a seat belt? No   Have you felt unusual stress, anger or loneliness in the last month? No   Who do you live with? Alone   If you need help, do you have trouble finding someone available to you? No   Have you been bothered in the last four weeks by sexual problems? No   Do you have difficulty concentrating, remembering or making decisions? No       Age-appropriate Screening Schedule:  Refer to the list below for future screening recommendations based on patient's age, sex and/or medical conditions. Orders for these recommended tests are listed in the plan section. The patient has been provided with a written plan.    Health Maintenance   Topic Date Due   • ZOSTER VACCINE (1 of 2) Never done   • LIPID PANEL  03/31/2022   • TDAP/TD VACCINES (1 - Tdap) 05/22/2025 (Originally 8/3/1969)   • INFLUENZA VACCINE  10/01/2022              Assessment & Plan     CMS Preventative Services Quick Reference  Risk Factors Identified During Encounter  Immunizations Discussed/Encouraged (specific Immunizations; COVID19  The above risks/problems have been discussed with the patient.  Follow up actions/plans if indicated are seen below in the Assessment/Plan Section.  Pertinent information has been shared with the patient in the After Visit Summary.    ECG 12 Lead    Date/Time: 8/1/2022 10:54 AM  Performed by: Jacqueline Torres PA-C  Authorized by: Jacqueline Torres PA-C   Comparison: not compared with previous ECG   Rhythm: paced  Rate: normal  BPM: 60  QRS axis: normal  Pacing: ventricular paced rhythm and dual chamber pacing          Diagnoses and all orders for this visit:    1. Medicare annual wellness visit, subsequent (Primary)  -     TSH; Future  -     Comprehensive metabolic panel; Future  -     CBC (No Diff); Future  -     Lipid Panel; Future  -     Vitamin B12 & Folate; Future    2. Mixed hyperlipidemia  -     Comprehensive metabolic  panel; Future  -     Lipid Panel; Future    3. Prediabetes  -     Comprehensive metabolic panel; Future  -     Lipid Panel; Future  -     Hemoglobin A1c; Future    4. Prostate cancer (HCC)    5. Sick sinus syndrome (HCC)  -     Comprehensive metabolic panel; Future  -     CBC (No Diff); Future  -     Lipid Panel; Future  -     Vitamin B12 & Folate; Future    6. Abnormal blood level of magnesium  -     Magnesium; Future    7. Class 1 obesity due to excess calories with serious comorbidity and body mass index (BMI) of 34.0 to 34.9 in adult    Other orders  -     ECG 12 Lead      The patient is doing well overall. He will continue to follow up with his urologist. I will order blood work to check his cholesterol, blood glucose, vitamin levels, and blood count.    Follow Up:  Return in about 6 months (around 2/1/2023).     An After Visit Summary and PPPS were given to the patient.           Transcribed from ambient dictation for Jacqueline Torres PA-C by TEA ORTIZ.  08/01/22   13:48 EDT    Patient verbalized consent to the visit recording.  I have personally performed the services described in this document as transcribed by the above individual, and it is both accurate and complete.  Jacqueline Torres PA-C  8/1/2022  14:45 EDT

## 2022-08-01 NOTE — PROGRESS NOTES
Jazmine Russo is a 71 y.o. male  Medicare Wellness-subsequent (Sub Medicare Wellness )      History of Present Illness    The following portions of the patient's history were reviewed and updated as appropriate: allergies, current medications, past social history and problem list    Review of Systems    Objective     Vitals:    08/01/22 1013   BP: 122/70   Pulse: 67   Temp: 97.2 °F (36.2 °C)   SpO2: 99%       Physical Exam    Assessment & Plan     Diagnoses and all orders for this visit:    1. Medicare annual wellness visit, subsequent (Primary)    2. Mixed hyperlipidemia    3. Prediabetes    4. Prostate cancer (HCC)    5. Sick sinus syndrome (HCC)

## 2022-08-02 LAB
ALBUMIN SERPL-MCNC: 3.9 G/DL (ref 3.5–5.2)
ALBUMIN/GLOB SERPL: 1.4 G/DL
ALP SERPL-CCNC: 59 U/L (ref 39–117)
ALT SERPL W P-5'-P-CCNC: 23 U/L (ref 1–41)
ANION GAP SERPL CALCULATED.3IONS-SCNC: 14.1 MMOL/L (ref 5–15)
AST SERPL-CCNC: 19 U/L (ref 1–40)
BILIRUB SERPL-MCNC: 0.6 MG/DL (ref 0–1.2)
BUN SERPL-MCNC: 12 MG/DL (ref 8–23)
BUN/CREAT SERPL: 10.9 (ref 7–25)
CALCIUM SPEC-SCNC: 9.7 MG/DL (ref 8.6–10.5)
CHLORIDE SERPL-SCNC: 102 MMOL/L (ref 98–107)
CHOLEST SERPL-MCNC: 234 MG/DL (ref 0–200)
CO2 SERPL-SCNC: 23.9 MMOL/L (ref 22–29)
CREAT SERPL-MCNC: 1.1 MG/DL (ref 0.76–1.27)
EGFRCR SERPLBLD CKD-EPI 2021: 71.3 ML/MIN/1.73
FOLATE SERPL-MCNC: 11.9 NG/ML (ref 4.78–24.2)
GLOBULIN UR ELPH-MCNC: 2.7 GM/DL
GLUCOSE SERPL-MCNC: 99 MG/DL (ref 65–99)
HBA1C MFR BLD: 6.3 % (ref 4.8–5.6)
HDLC SERPL-MCNC: 43 MG/DL (ref 40–60)
LDLC SERPL CALC-MCNC: 164 MG/DL (ref 0–100)
LDLC/HDLC SERPL: 3.75 {RATIO}
MAGNESIUM SERPL-MCNC: 2.4 MG/DL (ref 1.6–2.4)
POTASSIUM SERPL-SCNC: 4.8 MMOL/L (ref 3.5–5.2)
PROT SERPL-MCNC: 6.6 G/DL (ref 6–8.5)
SODIUM SERPL-SCNC: 140 MMOL/L (ref 136–145)
TRIGL SERPL-MCNC: 149 MG/DL (ref 0–150)
TSH SERPL DL<=0.05 MIU/L-ACNC: 1.89 UIU/ML (ref 0.27–4.2)
VIT B12 BLD-MCNC: 231 PG/ML (ref 211–946)
VLDLC SERPL-MCNC: 27 MG/DL (ref 5–40)

## 2022-08-02 RX ORDER — ONDANSETRON 8 MG/1
8 TABLET, ORALLY DISINTEGRATING ORAL EVERY 8 HOURS PRN
Qty: 10 TABLET | Refills: 0 | Status: SHIPPED | OUTPATIENT
Start: 2022-08-02

## 2022-08-02 RX ORDER — ONDANSETRON 8 MG/1
8 TABLET, ORALLY DISINTEGRATING ORAL EVERY 8 HOURS PRN
Qty: 10 TABLET | Refills: 0 | Status: CANCELLED | OUTPATIENT
Start: 2022-08-02

## 2022-08-03 ENCOUNTER — HOSPITAL ENCOUNTER (OUTPATIENT)
Dept: RADIATION ONCOLOGY | Facility: HOSPITAL | Age: 72
Discharge: HOME | End: 2022-08-03

## 2022-08-03 PROCEDURE — 77373 STRTCTC BDY RAD THER TX DLVR: CPT | Performed by: RADIOLOGY

## 2022-08-03 PROCEDURE — 77290 THER RAD SIMULAJ FIELD CPLX: CPT | Performed by: RADIOLOGY

## 2022-08-05 ENCOUNTER — HOSPITAL ENCOUNTER (OUTPATIENT)
Dept: RADIATION ONCOLOGY | Facility: HOSPITAL | Age: 72
Discharge: HOME | End: 2022-08-05

## 2022-08-05 PROCEDURE — 77373 STRTCTC BDY RAD THER TX DLVR: CPT | Performed by: RADIOLOGY

## 2022-08-05 PROCEDURE — 77290 THER RAD SIMULAJ FIELD CPLX: CPT | Performed by: RADIOLOGY

## 2022-08-08 ENCOUNTER — OFFICE VISIT (OUTPATIENT)
Dept: CARDIOLOGY | Facility: CLINIC | Age: 72
End: 2022-08-08

## 2022-08-08 VITALS
DIASTOLIC BLOOD PRESSURE: 70 MMHG | BODY MASS INDEX: 35 KG/M2 | HEART RATE: 61 BPM | OXYGEN SATURATION: 98 % | WEIGHT: 223 LBS | SYSTOLIC BLOOD PRESSURE: 132 MMHG | HEIGHT: 67 IN

## 2022-08-08 DIAGNOSIS — E78.5 HYPERLIPIDEMIA LDL GOAL <100: ICD-10-CM

## 2022-08-08 DIAGNOSIS — Z95.0 PACEMAKER: Primary | ICD-10-CM

## 2022-08-08 PROCEDURE — 99213 OFFICE O/P EST LOW 20 MIN: CPT | Performed by: PHYSICIAN ASSISTANT

## 2022-08-08 NOTE — PROGRESS NOTES
[facility]    Encounter Date: 10/29/2021    Patient ID: Jeb Russo is a 72 y.o. male.  : 1950     PCP: Jeff Phan MD       Chief Complaint: Erosion of pacemaker pocket due to and not concurrent with       PROBLEM LIST:  1. Sick Sinus Rhythm  a. Episode of Syncope   b. Subsequent extensive cardiac evaluation including negative cardiac catheterization study.   c. [facility] dual-chamber pacemaker implant in   d. PM generator change by Dr. Saini, 2012  e. PM generator change by Dr. Saini, 2021  f. Revision of pacemaker generator 2021 secondary to pocket erosion, discomfort.  2. Dyslipidemia, not on any treatment.  3. Prostate cancer, s/p prostatectomy  4. Obesity, BMI 30  5. Surgical history:   a. Prostatectomy   b. Knee surgery/ ACL repair (remote past)    History of Present Illness  Pleasant 72-year-old gent returns a follow-up regarding sick sinus syndrome, dyslipidemia, status post recent pacemaker pocket revision.  Patient states since the procedure he has very little discomfort in this area overall healed quite well.  He is having no dizziness near syncope syncope no chest pain his cholesterol has been treating with diet exercise.  He travels frequently because countries.  He states overall has been doing well.  No Known Allergies      Current Outpatient Medications:   •  B Complex Vitamins (VITAMIN B COMPLEX PO), Take  by mouth Every Other Day., Disp: , Rfl:   •  COLLAGEN PO, Take  by mouth., Disp: , Rfl:   •  Multiple Vitamins-Minerals (MULTIVITAMIN ADULT PO), Take 15 mg by mouth Daily., Disp: , Rfl:   •  ondansetron ODT (Zofran ODT) 8 MG disintegrating tablet, Place 1 tablet on the tongue Every 8 (Eight) Hours As Needed for Nausea or Vomiting. Take 1 tab 1 hour prior to procedure, Disp: 10 tablet, Rfl: 0  •  vitamin D3 125 MCG (5000 UT) capsule capsule, Take 5,000 Units by mouth Daily., Disp: , Rfl:   •  Zinc 30 MG  "tablet, Take 30 mg by mouth Daily., Disp: , Rfl:     The following portions of the patient's history were reviewed and updated as appropriate: allergies, current medications, past family history, past medical history, past social history, past surgical history and problem list.    All other systems reviewed and are negative.        Objective:     /70 (BP Location: Right arm, Patient Position: Sitting)   Pulse 61   Ht 170.2 cm (67\")   Wt 101 kg (223 lb)   SpO2 98%   BMI 34.93 kg/m²      Physical Exam  Constitutional: Patient appears well-developed and well-nourished.   HENT: HEENT exam unremarkable.   Neck: Neck supple. No JVD present. No carotid bruits.   Cardiovascular: Normal rate, regular rhythm and normal heart sounds. No murmur heard.   2+ symmetric pulses.   Pulmonary/Chest: Breath sounds normal. Does not exhibit tenderness.   Abdominal: Abdomen benign.   Musculoskeletal: Does not exhibit edema.   Neurological: Neurological exam unremarkable.   Vitals reviewed.    Data Review:   Lab Results   Component Value Date    GLUCOSE 99 08/01/2022    BUN 12 08/01/2022    CREATININE 1.10 08/01/2022    EGFRIFNONA 75 11/01/2021    BCR 10.9 08/01/2022    K 4.8 08/01/2022    CO2 23.9 08/01/2022    CALCIUM 9.7 08/01/2022    ALBUMIN 3.90 08/01/2022    AST 19 08/01/2022    ALT 23 08/01/2022     Lab Results   Component Value Date    CHOL 234 (H) 08/01/2022    TRIG 149 08/01/2022    HDL 43 08/01/2022     (H) 08/01/2022      Lab Results   Component Value Date    WBC 6.10 08/01/2022    RBC 6.14 (H) 08/01/2022    HGB 17.3 08/01/2022    HCT 53.4 (H) 08/01/2022    MCV 87.0 08/01/2022     08/01/2022     Lab Results   Component Value Date    HGBA1C 6.30 (H) 08/01/2022        Procedures     Manual device interrogation, 08/08/22:   [employer] pacemaker DDD.  60.  A pacing 9%.  RV paced 9%.  Normal P wave threshold impedances.  Battery voltage 8 years remaining.        Assessment:      Diagnosis Plan   1. " Sick sinus syndrome (HCC), status post PPM with normal pacemaker function. Schedule PPM revision with Dr. Jaramillo in next 2 weeks to implant device deeper into muscle    2. Dyslipidemia  Monitored by PCP, continue diet and exercise.     Plan:   Stable CV course pacemaker revision neck successful with no further pacemaker pocket pain.  Wound healed well.  Return for follow-up in 1 year sooner as needed  Electronically signed by GIO Gonzalez, 08/08/22, 12:29 PM EDT.

## 2022-08-09 ENCOUNTER — HOSPITAL ENCOUNTER (OUTPATIENT)
Dept: RADIATION ONCOLOGY | Facility: HOSPITAL | Age: 72
Discharge: HOME | End: 2022-08-09

## 2022-08-09 PROCEDURE — 77290 THER RAD SIMULAJ FIELD CPLX: CPT | Performed by: RADIOLOGY

## 2022-08-09 PROCEDURE — 77373 STRTCTC BDY RAD THER TX DLVR: CPT | Performed by: RADIOLOGY

## 2022-08-11 ENCOUNTER — HOSPITAL ENCOUNTER (OUTPATIENT)
Dept: RADIATION ONCOLOGY | Facility: HOSPITAL | Age: 72
Discharge: HOME | End: 2022-08-11

## 2022-08-11 PROCEDURE — 77290 THER RAD SIMULAJ FIELD CPLX: CPT | Performed by: RADIOLOGY

## 2022-08-11 PROCEDURE — 77373 STRTCTC BDY RAD THER TX DLVR: CPT | Performed by: RADIOLOGY

## 2022-08-12 ENCOUNTER — HOSPITAL ENCOUNTER (OUTPATIENT)
Dept: RADIATION ONCOLOGY | Facility: HOSPITAL | Age: 72
Discharge: HOME | End: 2022-08-12

## 2022-08-12 PROCEDURE — 77290 THER RAD SIMULAJ FIELD CPLX: CPT | Performed by: RADIOLOGY

## 2022-08-12 PROCEDURE — 77373 STRTCTC BDY RAD THER TX DLVR: CPT | Performed by: RADIOLOGY

## 2022-08-12 PROCEDURE — 77336 RADIATION PHYSICS CONSULT: CPT | Performed by: RADIOLOGY

## 2022-09-16 ENCOUNTER — OFFICE VISIT (OUTPATIENT)
Dept: RADIATION ONCOLOGY | Facility: HOSPITAL | Age: 72
End: 2022-09-16

## 2022-09-16 ENCOUNTER — HOSPITAL ENCOUNTER (OUTPATIENT)
Dept: RADIATION ONCOLOGY | Facility: HOSPITAL | Age: 72
Setting detail: RADIATION/ONCOLOGY SERIES
Discharge: HOME | End: 2022-09-16

## 2022-09-16 VITALS — WEIGHT: 209 LBS | BODY MASS INDEX: 32.73 KG/M2

## 2022-09-16 DIAGNOSIS — C77.2 SECONDARY AND UNSPECIFIED MALIGNANT NEOPLASM OF INTRA-ABDOMINAL LYMPH NODES: ICD-10-CM

## 2022-09-16 DIAGNOSIS — C61 PROSTATE CANCER: Primary | ICD-10-CM

## 2022-09-16 PROCEDURE — G0463 HOSPITAL OUTPT CLINIC VISIT: HCPCS

## 2022-09-16 NOTE — PROGRESS NOTES
TELEMEDICINE FOLLOW UP NOTE    PATIENT:                                                      Jeb Russo  MEDICAL RECORD #:                        [id number]  :                                                          1950  COMPLETION DATE:   2022  DIAGNOSIS:     Prostate cancer (HCC)  - Stage BENNIE (pT2, pN1, cM0, PSA: 5, Grade Group: 2)    This visit has been converted to a telehealth virtual visit using real-time audio and video with the patient who is currently living [place] of the [place].  Total time of discussion was 10 minutes.  The patient has given verbal consent.      BRIEF HISTORY:    Initial telehealth visit with [place]  His sister was present in the exam room.  He has a history of high risk prostate cancer status post prostatectomy 2015.  Lymph nodes were involved by metastatic disease.  He developed rising PSA up to a maximum value 2.08 ng/ml on 2021.  Conventional imaging studies were negative.  He initiated androgen ablation and PSA decreased to 0.12 ng/ml, indicating he still has hormone sensitive disease.  Androgen ablation was poorly tolerated, and he wished to discontinue hormonal therapy.  He completed a course of salvage pelvic irradiation on 2021.  He tolerated treatment well.  Unfortunately, PSA increased on 1.46 ng/ml on 2022 and 1.7 ng/ml on 2022.  Restaging PSMA PET/CT scan performed 2022 demonstrates 2 small, hypermetabolic lymph nodes in the left upper internal iliac and left mid periaortic regions consistent with metastatic disease.  These are outside the prior radiotherapy field.  He underwent salvage stereotactic body radiotherapy on the CyberKnife to a dose of 35 Gray in 5 fractions delivered to the 2 isolated foci of hypermetabolic lymphadenopathy.  He tolerated treatment well.  He did not develop treatment related fatigue.  He denies nausea, vomiting, diarrhea, constipation.  He denies acute  urinary complaints or change in bladder function.  He denies lymphedema.  He denies dyspnea or additional concerns today.        MEDICATIONS: Medication reconciliation for the patient was reviewed and confirmed in the electronic medical record.    Review of Systems   All other systems reviewed and are negative.          KPS 90%      Physical Exam  Pulmonary:      Respirations even, unlabored. No audible wheezing or cough.  Neurological:      A+Ox4, conversant, answers questions appropriately.  Psychiatric:     Judgement, affect, and decision-making WNL.    Limited physical exam as visit was conducted remotely via telephone in light of the COVID-19 pandemic and patient residing outside of the [place] at the time of visit.        VITAL SIGNS:   Vitals:    09/16/22 1021   Weight: 94.8 kg (209 lb)  Comment: stated per sister           The following portions of the patient's history were reviewed and updated as appropriate: allergies, current medications, past family history, past medical history, past social history, past surgical history and problem list.         Diagnoses and all orders for this visit:    1. Prostate cancer (HCC) (Primary)    2. 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  He is 7-1/2 years status post prostatectomy.  After brief trial of androgen ablation he discontinued hormonal therapy due to side effects.  He completed salvage pelvic irradiation 10 months ago.  He tolerated that well.  He is currently asymptomatic.  He again has rising PSA value up to 1.7 ng/ml.  He is now 1 month status post salvage CyberKnife SBRT to the 2 hypermetabolic lymph nodes in the left upper internal iliac and left mid periaortic regions which are outside the prior radiotherapy field.  He tolerated treatment well.  He did not develop acute radiation related toxicities.  Patient still wishes to delay/avoid androgen ablation or any  systemic treatment measures.  He will have 3-month repeat PSA drawn in mid-November 2022.  He has arranged to have this drawn locally in [place] and will have his sister deliver/email the result to our office.  We discussed biannual PSA monitoring and repeat imaging only as clinically indicated.      RECOMMENDATIONS:  Jeb Russo will have repeat PSA drawn 3 months following SBRT.  He wishes to discuss findings via telemedicine appointment at that time.       Return in about 3 months (around 12/7/2022) for Office Visit.    EMANI Eng    I spent a total of 25 minutes on today's visit, with more than 10 minutes in virtual communication with the patient via telephone, and the remainder of the time spent in reviewing the relevant history, records, available imaging, and for documentation.

## 2022-12-20 ENCOUNTER — HOSPITAL ENCOUNTER (OUTPATIENT)
Dept: RADIATION ONCOLOGY | Facility: HOSPITAL | Age: 72
Setting detail: RADIATION/ONCOLOGY SERIES
Discharge: HOME | End: 2022-12-20
Payer: MEDICARE

## 2023-01-03 ENCOUNTER — OFFICE VISIT (OUTPATIENT)
Dept: RADIATION ONCOLOGY | Facility: HOSPITAL | Age: 73
End: 2023-01-03
Payer: MEDICARE

## 2023-01-03 ENCOUNTER — HOSPITAL ENCOUNTER (OUTPATIENT)
Dept: RADIATION ONCOLOGY | Facility: HOSPITAL | Age: 73
Setting detail: RADIATION/ONCOLOGY SERIES
Discharge: HOME | End: 2023-01-03
Payer: MEDICARE

## 2023-01-03 DIAGNOSIS — C77.2 SECONDARY AND UNSPECIFIED MALIGNANT NEOPLASM OF INTRA-ABDOMINAL LYMPH NODES: Primary | ICD-10-CM

## 2023-01-03 DIAGNOSIS — C61 PROSTATE CANCER: ICD-10-CM

## 2023-01-03 PROCEDURE — G0463 HOSPITAL OUTPT CLINIC VISIT: HCPCS

## 2023-01-03 NOTE — PROGRESS NOTES
TELEMEDICINE FOLLOW UP NOTE     PATIENT:                                                             Jeb Russo  MEDICAL RECORD #:                                 [id number]  :                                                            1950  COMPLETION DATE:                                    2022  DIAGNOSIS:                                                   Prostate cancer (HCC)  - Stage BENNIE (pT2, pN1, cM0, PSA: 5, Grade Group: 2)     This visit has been converted to a telehealth virtual visit using real-time audio and video with the patient who is currently living [place] of the [place].  Total time of discussion was 15 minutes.  The patient has given verbal consent.        BRIEF HISTORY:    Telehealth visit with [place]  His sister was present in the exam room.  He has a history of high risk prostate cancer status post prostatectomy 2015.  Lymph nodes were involved by metastatic disease.  He developed rising PSA up to a maximum value 2.08 ng/ml on 2021.  Conventional imaging studies were negative.  He initiated androgen ablation and PSA decreased to 0.12 ng/ml, indicating he still had hormone sensitive disease.  Androgen ablation was poorly tolerated, and he wished to discontinue hormonal therapy.  He completed a course of conventionally fractionated salvage pelvic irradiation on 2021.  He tolerated treatment well.  Unfortunately, PSA increased on 1.46 ng/ml on 2022 and 1.7 ng/ml on 2022.  Restaging PSMA PET/CT scan performed 2022 demonstrates 2 small, hypermetabolic lymph nodes in the left upper internal iliac and left mid periaortic regions consistent with metastatic disease.  These were located outside the prior radiotherapy field.  He underwent salvage stereotactic body radiotherapy on the CyberKnife to a dose of 35 Gray in 5 fractions delivered to the 2 isolated foci of hypermetabolic lymphadenopathy.  He tolerated  salvage SBRT treatment well.  He currently denies any treatment related fatigue.  He is back to exercising regularly. His previous shoulder pain has resolved and he no has no pain.  He denies any bothersome urinary complaints or change in bowel function.  He denies lymphedema.  He denies dyspnea or additional concerns today.  No hot flashes.   PSA 11-: 1.5 ng/ml (drawn in [place])        MEDICATIONS: Medication reconciliation for the patient was reviewed and confirmed in the electronic medical record.     Review of Systems   All other systems reviewed and are negative.            KPS 90%        Physical Exam  Pulmonary:      Respirations even, unlabored. No audible wheezing or cough.  Neurological:      A+Ox4, conversant, answers questions appropriately.  Psychiatric:     Judgement, affect, and decision-making WNL.    Limited physical exam as visit was conducted remotely via telephone in light of the COVID-19 pandemic and patient residing [place] of the [place] at the time of visit.           VITAL SIGNS:   Vitals       Vitals:     09/16/22 1021   Weight: 94.8 kg (209 lb)  Comment: stated per sister                The following portions of the patient's history were reviewed and updated as appropriate: allergies, current medications, past family history, past medical history, past social history, past surgical history and problem list.        Assessment         Diagnoses and all orders for this visit:     1. Prostate cancer (HCC) (Primary)     2. 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    He is almost 8 years status post prostatectomy.  After brief trial of androgen ablation he discontinued hormonal therapy due to side effects.  He completed salvage pelvic irradiation more than 1 year ago.  He was again found to have rising PSA value up to 1.7 ng/ml.  He is now 4 months status post salvage CyberKnife SBRT to the 2  hypermetabolic lymph nodes in the left upper internal iliac and left mid periaortic regions which were outside the prior pelvic radiotherapy field.  He tolerated treatment well.  He did not develop acute radiation related toxicities.  PSA decreased to value 1.5 ng/ml 3 months following SBRT, indicating at least a partial response to salvage treatment, but is still detectable.  Patient still wishes to delay/avoid androgen ablation or any systemic treatment measures.  He will continue every 3-month repeat PSA.  He will have this drawn locally in [facility] and will have his sister deliver/email the result to our office.  We discussed returning here for repeat imaging only as clinically indicated, if he has at least 2 consecutive rises in PSA or if he develops any persistent symptoms.        RECOMMENDATIONS:  Jeb Russo will have repeat PSA drawn every 3 months.  He wishes to discuss findings via telemedicine appointment at that time.   He will try to maintain a healthy, active lifestyle.        Return in about 3 months for Telehealth Visit.     Niraj Otoole MD     I spent a total of 25 minutes on today's visit, with more than 15 minutes in virtual communication with the patient via telephone, and the remainder of the time spent in reviewing the relevant history, records, available imaging, and for documentation.

## 2023-01-03 NOTE — LETTER
January 3, 2023     Julián Foley MD  [address]   [id number]  Formerly [facility] [id number]    Patient: Jeb Russo   YOB: 1950   Date of Visit: 1/3/2023       Dear Julián Foley MD    Jeb Russo was in my office today. Below is a copy of my note.    If you have questions, please do not hesitate to call me. I look forward to following Jeb along with you.         Sincerely,        Niraj Otoole MD        CC: Jeff Phan MD    TELEMEDICINE FOLLOW UP NOTE     PATIENT:                                                             Jeb Russo  MEDICAL RECORD #:                                 [id number]  :                                                            1950  COMPLETION DATE:                                    2022  DIAGNOSIS:                                                   Prostate cancer (HCC)  - Stage BENNIE (pT2, pN1, cM0, PSA: 5, Grade Group: 2)     This visit has been converted to a telehealth virtual visit using real-time audio and video with the patient who is currently living [place] of the [place].  Total time of discussion was 15 minutes.  The patient has given verbal consent.        BRIEF HISTORY:    Telehealth visit with patient who is [place] living out of the country.  His sister was present in the exam room.  He has a history of high risk prostate cancer status post prostatectomy 2015.  Lymph nodes were involved by metastatic disease.  He developed rising PSA up to a maximum value 2.08 ng/ml on 2021.  Conventional imaging studies were negative.  He initiated androgen ablation and PSA decreased to 0.12 ng/ml, indicating he still had hormone sensitive disease.  Androgen ablation was poorly tolerated, and he wished to discontinue hormonal therapy.  He completed a course of conventionally fractionated salvage pelvic irradiation on 2021.  He tolerated treatment well.  Unfortunately, PSA increased on 1.46 ng/ml on  2/26/2022 and 1.7 ng/ml on 5/28/2022.  Restaging PSMA PET/CT scan performed 7/20/2022 demonstrates 2 small, hypermetabolic lymph nodes in the left upper internal iliac and left mid periaortic regions consistent with metastatic disease.  These were located outside the prior radiotherapy field.  He underwent salvage stereotactic body radiotherapy on the CyberKnife to a dose of 35 Gray in 5 fractions delivered to the 2 isolated foci of hypermetabolic lymphadenopathy.  He tolerated salvage SBRT treatment well.  He currently denies any treatment related fatigue.  He is back to exercising regularly. His previous shoulder pain has resolved and he no has no pain.  He denies any bothersome urinary complaints or change in bowel function.  He denies lymphedema.  He denies dyspnea or additional concerns today.  No hot flashes.   PSA 11-: 1.5 ng/ml (drawn in [place])        MEDICATIONS: Medication reconciliation for the patient was reviewed and confirmed in the electronic medical record.     Review of Systems   All other systems reviewed and are negative.            KPS 90%        Physical Exam  Pulmonary:      Respirations even, unlabored. No audible wheezing or cough.  Neurological:      A+Ox4, conversant, answers questions appropriately.  Psychiatric:     Judgement, affect, and decision-making WNL.    Limited physical exam as visit was conducted remotely via telephone in light of the COVID-19 pandemic and patient residing outside of the [place] at the time of visit.           VITAL SIGNS:   Vitals       Vitals:     09/16/22 1021   Weight: 94.8 kg (209 lb)  Comment: stated per sister                The following portions of the patient's history were reviewed and updated as appropriate: allergies, current medications, past family history, past medical history, past social history, past surgical history and problem list.        Assessment         Diagnoses and all orders for this visit:     1. Prostate cancer (HCC)  (Primary)     2. 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    He is almost 8 years status post prostatectomy.  After brief trial of androgen ablation he discontinued hormonal therapy due to side effects.  He completed salvage pelvic irradiation more than 1 year ago.  He was again found to have rising PSA value up to 1.7 ng/ml.  He is now 4 months status post salvage CyberKnife SBRT to the 2 hypermetabolic lymph nodes in the left upper internal iliac and left mid periaortic regions which were outside the prior pelvic radiotherapy field.  He tolerated treatment well.  He did not develop acute radiation related toxicities.  PSA decreased to value 1.5 ng/ml 3 months following SBRT, indicating at least a partial response to salvage treatment, but is still detectable.  Patient still wishes to delay/avoid androgen ablation or any systemic treatment measures.  He will continue every 3-month repeat PSA.  He will have this drawn locally in [facility] and will have his sister deliver/email the result to our office.  We discussed returning here for repeat imaging only as clinically indicated, if he has at least 2 consecutive rises in PSA or if he develops any persistent symptoms.        RECOMMENDATIONS:  Jeb Russo will have repeat PSA drawn every 3 months.  He wishes to discuss findings via telemedicine appointment at that time.   He will try to maintain a healthy, active lifestyle.        Return in about 3 months for Telehealth Visit.     Niraj Otoole MD     I spent a total of 25 minutes on today's visit, with more than 15 minutes in virtual communication with the patient via telephone, and the remainder of the time spent in reviewing the relevant history, records, available imaging, and for documentation.

## 2023-03-08 ENCOUNTER — TELEPHONE (OUTPATIENT)
Dept: RADIATION ONCOLOGY | Facility: HOSPITAL | Age: 73
End: 2023-03-08
Payer: MEDICARE

## 2023-03-08 NOTE — TELEPHONE ENCOUNTER
Pt's sister dropped off lab yesterday 3/7/2023. PSA 1.8. When I called sister back.  She states it was drawn at a different lab.   I explained Dr. Otoole instructed to get another PSA in 3 months and to let us know what it is.  If needed we will order a Pylarify PET if value increases.  Sister verbalized understanding.

## 2023-06-09 ENCOUNTER — OFFICE VISIT (OUTPATIENT)
Dept: RADIATION ONCOLOGY | Facility: HOSPITAL | Age: 73
End: 2023-06-09
Payer: MEDICARE

## 2023-06-09 DIAGNOSIS — C61 PROSTATE CANCER: Primary | ICD-10-CM

## 2023-06-09 DIAGNOSIS — C77.2 SECONDARY AND UNSPECIFIED MALIGNANT NEOPLASM OF INTRA-ABDOMINAL LYMPH NODES: ICD-10-CM

## 2023-06-09 NOTE — PROGRESS NOTES
Patient was scheduled to have telehealth follow-up with me today.  He is living in [place].  I instead had a conversation with his sister who is very involved in his care.  He is feeling well and has no complaints.  Unfortunately, PSA has continued to increase with values 1.8 ng/ml on 3/7/2023 and 3.0 ng/ml the end of May, 2023.  This indicates progression of his oligometastatic prostate cancer.  He is considering establishing local urology care in the [facility] but has not yet done so.  We discussed repeating a PSMA PET/CT evaluation if he is able to return to [facility] in the near future to evaluate whether he still has oligometastatic disease and if he would be a candidate for additional localized stereotactic body radiotherapy.  Consideration should also be given for restarting androgen ablation; however, patient has been reluctant to do so because of side effects.  His sister will let us know how he wishes to proceed.

## 2023-07-14 ENCOUNTER — TELEPHONE (OUTPATIENT)
Dept: FAMILY MEDICINE CLINIC | Facility: CLINIC | Age: 73
End: 2023-07-14
Payer: MEDICARE

## 2023-07-14 NOTE — TELEPHONE ENCOUNTER
Caller: Italo Angel    Relationship to patient: Emergency Contact    Best call back number:     Chief complaint: WELLNESS    Type of visit: SUB WELLNESS    Requested date: 090823 ANYTIME    If rescheduling, when is the original appointment: NA     Additional notes:PATIENT WILL BE AVAILABLE THIS DAY AND HAS AN APPT WITH HIS ONCOLOGIST [id number]; [facility] DIDN'T HAVE ANY OPENINGS UNTIL OCTOBER.      PLEASE CALL TO ADVISE

## 2023-07-14 NOTE — TELEPHONE ENCOUNTER
HUB TO READ: elena to call us back and speak with the office to see about getting him scheduled for a different day. 09/08/23 is full, but [facility]'s 9/9:30am physical is open sometimes and he can be put there.

## 2023-07-25 ENCOUNTER — TELEPHONE (OUTPATIENT)
Dept: CARDIOLOGY | Facility: CLINIC | Age: 73
End: 2023-07-25
Payer: MEDICARE

## 2023-07-25 NOTE — TELEPHONE ENCOUNTER
Caller: Italo Angel    Relationship to patient: Emergency Contact    Best call back number: [phone]    Type of visit: FOLLOW UP     Requested date: SEPTEMBER 8-22    If rescheduling, when is the original appointment: 8.16.23      Additional notes: PATIENT IS OUT OF THE COUNTRY AND IS NEEDING TO RESCHEDULE HIS APPT.

## 2023-09-11 ENCOUNTER — OFFICE VISIT (OUTPATIENT)
Dept: FAMILY MEDICINE CLINIC | Facility: CLINIC | Age: 73
End: 2023-09-11
Payer: MEDICARE

## 2023-09-11 ENCOUNTER — LAB (OUTPATIENT)
Dept: FAMILY MEDICINE CLINIC | Facility: CLINIC | Age: 73
End: 2023-09-11
Payer: MEDICARE

## 2023-09-11 VITALS
TEMPERATURE: 97.2 F | HEART RATE: 60 BPM | BODY MASS INDEX: 33.43 KG/M2 | SYSTOLIC BLOOD PRESSURE: 124 MMHG | DIASTOLIC BLOOD PRESSURE: 72 MMHG | WEIGHT: 213 LBS | OXYGEN SATURATION: 95 % | HEIGHT: 67 IN

## 2023-09-11 DIAGNOSIS — R73.09 ELEVATED GLUCOSE: ICD-10-CM

## 2023-09-11 DIAGNOSIS — I49.5 SICK SINUS SYNDROME: ICD-10-CM

## 2023-09-11 DIAGNOSIS — C61 PROSTATE CANCER: ICD-10-CM

## 2023-09-11 DIAGNOSIS — Z00.00 MEDICARE ANNUAL WELLNESS VISIT, SUBSEQUENT: Primary | ICD-10-CM

## 2023-09-11 DIAGNOSIS — E66.09 CLASS 1 OBESITY DUE TO EXCESS CALORIES WITH BODY MASS INDEX (BMI) OF 33.0 TO 33.9 IN ADULT, UNSPECIFIED WHETHER SERIOUS COMORBIDITY PRESENT: ICD-10-CM

## 2023-09-11 DIAGNOSIS — E55.9 VITAMIN D DEFICIENCY, UNSPECIFIED: ICD-10-CM

## 2023-09-11 DIAGNOSIS — Z83.49 FAMILY HISTORY OF VITAMIN D DEFICIENCY: ICD-10-CM

## 2023-09-11 DIAGNOSIS — Z86.2 HISTORY OF ANEMIA: ICD-10-CM

## 2023-09-11 LAB
DEPRECATED RDW RBC AUTO: 41.5 FL (ref 37–54)
ERYTHROCYTE [DISTWIDTH] IN BLOOD BY AUTOMATED COUNT: 13.2 % (ref 12.3–15.4)
HBA1C MFR BLD: 6.3 % (ref 4.8–5.6)
HCT VFR BLD AUTO: 51.1 % (ref 37.5–51)
HGB BLD-MCNC: 17 G/DL (ref 13–17.7)
MCH RBC QN AUTO: 28.8 PG (ref 26.6–33)
MCHC RBC AUTO-ENTMCNC: 33.3 G/DL (ref 31.5–35.7)
MCV RBC AUTO: 86.6 FL (ref 79–97)
PLATELET # BLD AUTO: 223 10*3/MM3 (ref 140–450)
PMV BLD AUTO: 10.2 FL (ref 6–12)
RBC # BLD AUTO: 5.9 10*6/MM3 (ref 4.14–5.8)
WBC NRBC COR # BLD: 6.3 10*3/MM3 (ref 3.4–10.8)

## 2023-09-11 PROCEDURE — 1160F RVW MEDS BY RX/DR IN RCRD: CPT | Performed by: PHYSICIAN ASSISTANT

## 2023-09-11 PROCEDURE — 1159F MED LIST DOCD IN RCRD: CPT | Performed by: PHYSICIAN ASSISTANT

## 2023-09-11 PROCEDURE — 80053 COMPREHEN METABOLIC PANEL: CPT | Performed by: PHYSICIAN ASSISTANT

## 2023-09-11 PROCEDURE — 83036 HEMOGLOBIN GLYCOSYLATED A1C: CPT | Performed by: PHYSICIAN ASSISTANT

## 2023-09-11 PROCEDURE — 82746 ASSAY OF FOLIC ACID SERUM: CPT | Performed by: PHYSICIAN ASSISTANT

## 2023-09-11 PROCEDURE — 82607 VITAMIN B-12: CPT | Performed by: PHYSICIAN ASSISTANT

## 2023-09-11 PROCEDURE — 80061 LIPID PANEL: CPT | Performed by: PHYSICIAN ASSISTANT

## 2023-09-11 PROCEDURE — 82306 VITAMIN D 25 HYDROXY: CPT | Performed by: PHYSICIAN ASSISTANT

## 2023-09-11 PROCEDURE — 85027 COMPLETE CBC AUTOMATED: CPT | Performed by: PHYSICIAN ASSISTANT

## 2023-09-11 PROCEDURE — 84153 ASSAY OF PSA TOTAL: CPT | Performed by: PHYSICIAN ASSISTANT

## 2023-09-11 PROCEDURE — 1170F FXNL STATUS ASSESSED: CPT | Performed by: PHYSICIAN ASSISTANT

## 2023-09-11 PROCEDURE — G0439 PPPS, SUBSEQ VISIT: HCPCS | Performed by: PHYSICIAN ASSISTANT

## 2023-09-11 PROCEDURE — 36415 COLL VENOUS BLD VENIPUNCTURE: CPT | Performed by: PHYSICIAN ASSISTANT

## 2023-09-11 NOTE — PROGRESS NOTES
The ABCs of the Annual Wellness Visit  Subsequent Medicare Wellness Visit    Chief Complaint   Patient presents with    Medicare Wellness-subsequent     Medicare wellness      Subjective   History of Present Illness:  Jeb Russo is a 73 y.o. male who presents for a Subsequent Medicare Wellness Visit.  Patient is feeling well, he is in town staying with his sister for the next couple of weeks, is seeing his cardiologist and oncologist next week, getting labs drawn today, Dr. Foley is his urologist.  The following portions of the patient's history were reviewed and   updated as appropriate: allergies, past family history, past medical history, past social history, past surgical history, and problem list.    Compared to one year ago, the patient feels his physical   health is better.    Compared to one year ago, the patient feels his mental   health is better.    Recent Hospitalizations:  He was not admitted to the hospital during the last year.       Current Medical Providers:  Patient Care Team:  Jeff Phan MD as PCP - General (Family Medicine)  Julián Foley MD as Referring Physician (Urology)  Valentina Saini MD as Consulting Physician (Cardiology)  Elmo Modi MD as Consulting Physician (Gastroenterology)  Leora Angel MD Lau, Clayton, MD Beckman, Alan C., MD as Consulting Physician (Radiation Oncology)    Outpatient Medications Prior to Visit   Medication Sig Dispense Refill    B Complex Vitamins (VITAMIN B COMPLEX PO) Take  by mouth Every Other Day.      COLLAGEN PO Take  by mouth.      Multiple Vitamins-Minerals (MULTIVITAMIN ADULT PO) Take 15 mg by mouth Daily.      vitamin D3 125 MCG (5000 UT) capsule capsule Take 1 capsule by mouth Daily.      Zinc 30 MG tablet Take 1 tablet by mouth Daily.      ondansetron ODT (Zofran ODT) 8 MG disintegrating tablet Place 1 tablet on the tongue Every 8 (Eight) Hours As Needed for Nausea or Vomiting. Take 1 tab 1 hour prior to procedure  "10 tablet 0     No facility-administered medications prior to visit.       No opioid medication identified on active medication list. I have reviewed chart for other potential  high risk medication/s and harmful drug interactions in the elderly.        Aspirin is not on active medication list.  Aspirin use is not indicated based on review of current medical condition/s. Risk of harm outweighs potential benefits.  .    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 Patient has an advance directive in EMR which is still valid.     Review of Systems   Constitutional:  Negative for fatigue and unexpected weight change.   HENT: Negative.     Respiratory:  Negative for cough, chest tightness and shortness of breath.    Cardiovascular:  Negative for chest pain, palpitations and leg swelling.   Gastrointestinal:  Negative for nausea.   Skin:  Negative for color change and rash.   Neurological:  Negative for dizziness, syncope, weakness and headaches.   Psychiatric/Behavioral: Negative.         Objective      Vitals:    09/11/23 0916   BP: 124/72   Pulse: 60   Temp: 97.2 °F (36.2 °C)   SpO2: 95%   Weight: 96.6 kg (213 lb)   Height: 170.2 cm (67\")   PainSc: 0-No pain     BMI Readings from Last 1 Encounters:   09/11/23 33.36 kg/m²   BMI is above normal parameters. Recommendations include: exercise counseling    Does the patient have evidence of cognitive impairment? No    Physical Exam  Vitals and nursing note reviewed.   Constitutional:       General: He is not in acute distress.     Appearance: Normal appearance. He is well-developed. He is obese. " He is not ill-appearing, toxic-appearing or diaphoretic.      Comments: BMI33   Neck:      Vascular: No carotid bruit or JVD.   Cardiovascular:      Rate and Rhythm: Normal rate and regular rhythm.      Pulses: Normal pulses.      Heart sounds: Normal heart sounds. No murmur heard.  Pulmonary:      Effort: Pulmonary effort is normal. No respiratory distress.      Breath sounds: Normal breath sounds.   Abdominal:      Palpations: Abdomen is soft.      Tenderness: There is no abdominal tenderness.   Skin:     General: Skin is warm and dry.   Neurological:      Mental Status: He is alert.               HEALTH RISK ASSESSMENT    Smoking Status:  Social History     Tobacco Use   Smoking Status Former    Packs/day: 0.50    Years: 1.00    Pack years: 0.50    Types: Cigarettes    Quit date:     Years since quittin.7   Smokeless Tobacco Never     Alcohol Consumption:  Social History     Substance and Sexual Activity   Alcohol Use Yes    Alcohol/week: 1.0 standard drink    Types: 1 Glasses of wine per week    Comment: weekly     Fall Risk Screen:    TK Fall Risk Assessment was completed, and patient is at LOW risk for falls.Assessment completed on:2023    Depression Screenin/11/2023     9:16 AM   PHQ-2/PHQ-9 Depression Screening   Little Interest or Pleasure in Doing Things 0-->not at all   Feeling Down, Depressed or Hopeless 0-->not at all   PHQ-9: Brief Depression Severity Measure Score 0       Health Habits and Functional and Cognitive Screenin/11/2023     9:15 AM   Functional & Cognitive Status   Do you have difficulty preparing food and eating? No   Do you have difficulty bathing yourself, getting dressed or grooming yourself? No   Do you have difficulty using the toilet? No   Do you have difficulty moving around from place to place? No   Do you have trouble with steps or getting out of a bed or a chair? No   Current Diet Well Balanced Diet   Dental Exam Up to date   Eye Exam Up to  date   Exercise (times per week) 0 times per week   Current Exercises Include No Regular Exercise   Do you need help using the phone?  No   Are you deaf or do you have serious difficulty hearing?  No   Do you need help to go to places out of walking distance? No   Do you need help shopping? No   Do you need help preparing meals?  No   Do you need help with housework?  No   Do you need help with laundry? No   Do you need help taking your medications? No   Do you need help managing money? No   Do you ever drive or ride in a car without wearing a seat belt? No   Have you felt unusual stress, anger or loneliness in the last month? No   Who do you live with? Alone   If you need help, do you have trouble finding someone available to you? No   Have you been bothered in the last four weeks by sexual problems? No   Do you have difficulty concentrating, remembering or making decisions? No       Age-appropriate Screening Schedule:  Refer to the list below for future screening recommendations based on patient's age, sex and/or medical conditions. Orders for these recommended tests are listed in the plan section. The patient has been provided with a written plan.    Health Maintenance   Topic Date Due    COVID-19 Vaccine (3 - Pfizer risk series) 11/03/2021    LIPID PANEL  08/01/2023    AAA SCREEN (ONE-TIME)  09/11/2023 (Originally 11/21/2017)    ZOSTER VACCINE (1 of 2) 09/11/2023 (Originally [birth year]/1968)    Pneumococcal Vaccine 65+ (1 - PCV) 09/11/2024 (Originally 8/3/1956)    TDAP/TD VACCINES (1 - Tdap) 05/22/2025 (Originally [birth year]/3/1968)    INFLUENZA VACCINE  10/01/2023    ANNUAL WELLNESS VISIT  09/11/2024    BMI FOLLOWUP  09/11/2024    COLORECTAL CANCER SCREENING  09/23/2031    HEPATITIS C SCREENING  Addressed              Assessment & Plan     CMS Preventative Services Quick Reference  Risk Factors Identified During Encounter  Encouraged increased exercise  The above risks/problems have been discussed with the patient.  Follow up  actions/plans if indicated are seen below in the Assessment/Plan Section.  Pertinent information has been shared with the patient in the After Visit Summary.    Diagnoses and all orders for this visit:    1. Medicare annual wellness visit, subsequent (Primary)    2. Sick sinus syndrome  -     Lipid Panel  -     Comprehensive metabolic panel  -     CBC (No Diff)    3. Elevated glucose  -     Hemoglobin A1c    4. History of anemia  -     Vitamin B12  -     Folate    5. Family history of vitamin D deficiency  -     Vitamin D,25-Hydroxy    6. Vitamin D deficiency, unspecified  -     Vitamin D,25-Hydroxy    7. Prostate cancer  -     PSA Diagnostic    8. Class 1 obesity due to excess calories with body mass index (BMI) of 33.0 to 33.9 in adult, unspecified whether serious comorbidity present        Follow Up:  Return in about 1 year (around 9/11/2024) for Medicare Wellness.     An After Visit Summary and PPPS were given to the patient.           Answers submitted by the patient for this visit:  Primary Reason for Visit (Submitted on 9/11/2023)  What is the primary reason for your visit?: Other  Other (Submitted on 9/11/2023)  Please describe your symptoms.: No symptoms  Have you had these symptoms before?: No  How long have you been having these symptoms?: Greater than 2 weeks  Please list any medications you are currently taking for this condition.: None  Please describe any probable cause for these symptoms. : None

## 2023-09-12 ENCOUNTER — OFFICE VISIT (OUTPATIENT)
Dept: CARDIOLOGY | Facility: CLINIC | Age: 73
End: 2023-09-12
Payer: MEDICARE

## 2023-09-12 VITALS
HEIGHT: 70 IN | DIASTOLIC BLOOD PRESSURE: 64 MMHG | WEIGHT: 216.2 LBS | OXYGEN SATURATION: 98 % | SYSTOLIC BLOOD PRESSURE: 116 MMHG | HEART RATE: 60 BPM | BODY MASS INDEX: 30.95 KG/M2

## 2023-09-12 DIAGNOSIS — I49.5 SICK SINUS SYNDROME: Primary | ICD-10-CM

## 2023-09-12 DIAGNOSIS — R06.02 SOB (SHORTNESS OF BREATH): ICD-10-CM

## 2023-09-12 LAB
25(OH)D3 SERPL-MCNC: 28.4 NG/ML (ref 30–100)
ALBUMIN SERPL-MCNC: 4 G/DL (ref 3.5–5.2)
ALBUMIN/GLOB SERPL: 1.5 G/DL
ALP SERPL-CCNC: 65 U/L (ref 39–117)
ALT SERPL W P-5'-P-CCNC: 21 U/L (ref 1–41)
ANION GAP SERPL CALCULATED.3IONS-SCNC: 13 MMOL/L (ref 5–15)
AST SERPL-CCNC: 31 U/L (ref 1–40)
BILIRUB SERPL-MCNC: 0.8 MG/DL (ref 0–1.2)
BUN SERPL-MCNC: 14 MG/DL (ref 8–23)
BUN/CREAT SERPL: 14.9 (ref 7–25)
CALCIUM SPEC-SCNC: 9.8 MG/DL (ref 8.6–10.5)
CHLORIDE SERPL-SCNC: 106 MMOL/L (ref 98–107)
CHOLEST SERPL-MCNC: 199 MG/DL (ref 0–200)
CO2 SERPL-SCNC: 20 MMOL/L (ref 22–29)
CREAT SERPL-MCNC: 0.94 MG/DL (ref 0.76–1.27)
EGFRCR SERPLBLD CKD-EPI 2021: 85.1 ML/MIN/1.73
FOLATE SERPL-MCNC: 9.7 NG/ML (ref 4.78–24.2)
GLOBULIN UR ELPH-MCNC: 2.6 GM/DL
GLUCOSE SERPL-MCNC: 108 MG/DL (ref 65–99)
HDLC SERPL-MCNC: 38 MG/DL (ref 40–60)
LDLC SERPL CALC-MCNC: 138 MG/DL (ref 0–100)
LDLC/HDLC SERPL: 3.56 {RATIO}
POTASSIUM SERPL-SCNC: 4.9 MMOL/L (ref 3.5–5.2)
PROT SERPL-MCNC: 6.6 G/DL (ref 6–8.5)
PSA SERPL-MCNC: 8.11 NG/ML (ref 0–4)
SODIUM SERPL-SCNC: 139 MMOL/L (ref 136–145)
TRIGL SERPL-MCNC: 128 MG/DL (ref 0–150)
VIT B12 BLD-MCNC: 248 PG/ML (ref 211–946)
VLDLC SERPL-MCNC: 23 MG/DL (ref 5–40)

## 2023-09-12 PROCEDURE — 93280 PM DEVICE PROGR EVAL DUAL: CPT | Performed by: PHYSICIAN ASSISTANT

## 2023-09-12 PROCEDURE — 99214 OFFICE O/P EST MOD 30 MIN: CPT | Performed by: PHYSICIAN ASSISTANT

## 2023-09-12 PROCEDURE — 93000 ELECTROCARDIOGRAM COMPLETE: CPT | Performed by: PHYSICIAN ASSISTANT

## 2023-09-12 NOTE — PROGRESS NOTES
[facility] Cardiology        Patient ID: Jeb Russo is a 73 y.o. male.  : 1950     PCP: Jeff Phan MD       Chief Complaint: 1 year follow up       PROBLEM LIST:  Sick Sinus Rhythm  Episode of Syncope   Subsequent extensive cardiac evaluation including negative cardiac catheterization study.   [facility] dual-chamber pacemaker implant in   PM generator change by Dr. Saini, 2012  PM generator change by Dr. Saini, 2021  Revision of pacemaker generator 2021 secondary to pocket erosion, discomfort. Dr. Jaramillo   Dyslipidemia, not on any treatment.  Prostate cancer, s/p prostatectomy  Obesity, BMI 30  Surgical history:   Prostatectomy   Knee surgery/ ACL repair (remote past)    History of Present Illness  Pleasant 73-year-old retired gentleman originally from [place] returns a follow-up regarding sick sinus syndrome, dyslipidemia, status post recent pacemaker pocket revision.  He is visiting from [place] [employer] [facility] with his sister that past couple months.  Patient states since the maker revision procedure he has very little discomfort in this area overall healed quite well.  He is having no dizziness near syncope syncope no chest pain his cholesterol has been treating with diet exercise.  He travels frequently between countries.   He states overall has been doing well.  He recently saw his primary care provider is going to see oncologist soon.      No Known Allergies      Current Outpatient Medications:     B Complex Vitamins (VITAMIN B COMPLEX PO), Take  by mouth Every Other Day., Disp: , Rfl:     COLLAGEN PO, Take  by mouth., Disp: , Rfl:     Multiple Vitamins-Minerals (MULTIVITAMIN ADULT PO), Take 15 mg by mouth Daily., Disp: , Rfl:     vitamin D3 125 MCG (5000 UT) capsule capsule, Take 1 capsule by mouth Daily., Disp: , Rfl:     Zinc 30 MG tablet, Take 1 tablet by mouth Daily., Disp: , Rfl:     The following portions of the patient's history were  "reviewed and updated as appropriate: allergies, current medications, past family history, past medical history, past social history, past surgical history and problem list.    All other systems reviewed and are negative.        Objective:     /64 (BP Location: Right arm, Patient Position: Sitting, Cuff Size: Adult)   Pulse 60   Ht 177.8 cm (70\")   Wt 98.1 kg (216 lb 3.2 oz)   SpO2 98%   BMI 31.02 kg/m²      Physical Exam  Constitutional: Patient appears well-developed and well-nourished.   HENT: HEENT exam unremarkable.   Neck: Neck supple. No JVD present. No carotid bruits.   Cardiovascular: Normal rate, regular rhythm and normal heart sounds. No murmur heard.   2+ symmetric pulses.   Pulmonary/Chest: Breath sounds normal. Does not exhibit tenderness.   Abdominal: Abdomen benign.   Musculoskeletal: Does not exhibit edema.   Neurological: Neurological exam unremarkable.   Vitals reviewed.    Data Review:   Lab Results   Component Value Date    GLUCOSE 108 (H) 09/11/2023    BUN 14 09/11/2023    CREATININE 0.94 09/11/2023    EGFRIFNONA 75 11/01/2021    BCR 14.9 09/11/2023    K 4.9 09/11/2023    CO2 20.0 (L) 09/11/2023    CALCIUM 9.8 09/11/2023    ALBUMIN 4.0 09/11/2023    AST 31 09/11/2023    ALT 21 09/11/2023     Lab Results   Component Value Date    CHOL 199 09/11/2023    TRIG 128 09/11/2023    HDL 38 (L) 09/11/2023     (H) 09/11/2023      Lab Results   Component Value Date    WBC 6.30 09/11/2023    RBC 5.90 (H) 09/11/2023    HGB 17.0 09/11/2023    HCT 51.1 (H) 09/11/2023    MCV 86.6 09/11/2023     09/11/2023     Lab Results   Component Value Date    HGBA1C 6.30 (H) 09/11/2023          ECG 12 Lead    Date/Time: 9/12/2023 1:11 PM  Performed by: Leland Trejo PA  Authorized by: Leland Trejo PA   Comparison: compared with previous ECG from 8/1/2022  Similar to previous ECG  Rhythm: sinus rhythm and paced  Rate: normal  Conduction: conduction normal  Conduction: left bundle branch " block  QRS axis: normal    Clinical impression: non-specific ECG         Manual device interrogation: [employer] dual-chamber pacemaker.  A paced 8%.  V paced 100%.  Normal P wave thresholds impedances.  Battery voltage 7 years remaining.  Underlying rhythm complete heart block.     09/12/23:           Assessment:      Diagnosis Plan   1. Sick sinus syndrome (HCC), status post PPM with normal pacemaker function. Normal interrogation today.   2. Dyslipidemia  Monitored by PCP, continue diet and exercise.     Plan:   100% RV pacing.  We will pursue an echocardiogram rule out any LV dysfunction.  Continue current therapy return for follow-up as scheduled.  Electronically signed by GIO Gonzalez, 09/12/23, 11:52 AM EDT.

## 2023-09-14 ENCOUNTER — HOSPITAL ENCOUNTER (OUTPATIENT)
Dept: PET IMAGING | Facility: HOSPITAL | Age: 73
Discharge: HOME | End: 2023-09-14
Payer: MEDICARE

## 2023-09-14 ENCOUNTER — OFFICE VISIT (OUTPATIENT)
Dept: RADIATION ONCOLOGY | Facility: HOSPITAL | Age: 73
End: 2023-09-14
Payer: MEDICARE

## 2023-09-14 ENCOUNTER — HOSPITAL ENCOUNTER (OUTPATIENT)
Dept: RADIATION ONCOLOGY | Facility: HOSPITAL | Age: 73
Setting detail: RADIATION/ONCOLOGY SERIES
Discharge: HOME | End: 2023-09-14
Payer: MEDICARE

## 2023-09-14 VITALS
RESPIRATION RATE: 20 BRPM | TEMPERATURE: 97.4 F | BODY MASS INDEX: 31.09 KG/M2 | HEART RATE: 62 BPM | HEIGHT: 70 IN | WEIGHT: 217.2 LBS | SYSTOLIC BLOOD PRESSURE: 138 MMHG | OXYGEN SATURATION: 98 % | DIASTOLIC BLOOD PRESSURE: 74 MMHG

## 2023-09-14 DIAGNOSIS — C61 PROSTATE CANCER: ICD-10-CM

## 2023-09-14 DIAGNOSIS — C77.2 SECONDARY AND UNSPECIFIED MALIGNANT NEOPLASM OF INTRA-ABDOMINAL LYMPH NODES: Primary | ICD-10-CM

## 2023-09-14 DIAGNOSIS — R97.21 RISING PSA FOLLOWING TREATMENT FOR MALIGNANT NEOPLASM OF PROSTATE: ICD-10-CM

## 2023-09-14 PROCEDURE — A9552 F18 FDG: HCPCS | Performed by: RADIOLOGY

## 2023-09-14 PROCEDURE — G0463 HOSPITAL OUTPT CLINIC VISIT: HCPCS | Performed by: RADIOLOGY

## 2023-09-14 PROCEDURE — 0 PIFLUFOLASTAT F 18 9 MCI SOLUTION PREFILLED SYRINGE: Performed by: RADIOLOGY

## 2023-09-14 PROCEDURE — 0 FLUDEOXYGLUCOSE F18 SOLUTION: Performed by: RADIOLOGY

## 2023-09-14 PROCEDURE — A9595 PIFLUFOLASTAT F 18 9 MCI SOLUTION PREFILLED SYRINGE: HCPCS | Performed by: RADIOLOGY

## 2023-09-14 PROCEDURE — 78815 PET IMAGE W/CT SKULL-THIGH: CPT

## 2023-09-14 RX ADMIN — PIFLUFOLASTAT F-18 1 DOSE: 80 INJECTION INTRAVENOUS at 13:15

## 2023-09-14 NOTE — PROGRESS NOTES
FOLLOW UP NOTE    PATIENT:                                                      Jeb Russo  MEDICAL RECORD #:                        [id number]  :                                                          1950  COMPLETION DATE:   2022  DIAGNOSIS:     Prostate cancer  - Stage BENNIE (pT2, pN1, cM0, PSA: 5, Grade Group: 2)      BRIEF HISTORY:    Follow-up visit.  He has oligometastatic prostate cancer.  He has a history of high risk prostate cancer status post prostatectomy 2015.  Lymph nodes were involved by metastatic disease.  He developed rising PSA up to a maximum value 2.08 ng/ml on 2021.  Conventional imaging studies were negative.  He initiated androgen ablation and PSA decreased to 0.12 ng/ml, indicating he still had hormone sensitive disease.  Androgen ablation was poorly tolerated, and he wished to discontinue hormonal therapy.  He completed a course of conventionally fractionated salvage pelvic irradiation on 2021.  He tolerated treatment well.  Unfortunately, PSA increased on 1.46 ng/ml on 2022 and 1.7 ng/ml on 2022.  Restaging PSMA PET/CT scan performed 2022 demonstrates 2 small, hypermetabolic lymph nodes in the left upper internal iliac and left mid periaortic regions consistent with metastatic disease.  These were located outside the prior radiotherapy field.  He underwent salvage stereotactic body radiotherapy on the CyberKnife to a dose of 35 Gray in 5 fractions delivered to the 2 isolated foci of hypermetabolic lymphadenopathy.  He tolerated salvage SBRT treatment well.  PSA 11-: 1.5 ng/ml (drawn in [place]), 1.8 ng/ml on 3/7/2023, 3 ng/ml the end of May 2023 and 8.1 ng/ml on 2023.  Pylarify PSMA PET/CT restaging performed earlier today shows resolution of the previously treated periaortic lymph nodes at the L3 level and the left upper internal iliac hypermetabolic lymph node.  There are 2 new small but enlarging hypermetabolic lymph nodes in  "the left upper periaortic region at L2 vertebral body level measuring 1 x 0.6 cm, SUV 5.1 and another small 4 mm periaortic lymph node at L4 vertebral body level which is now hypermetabolic with SUV 3.4.  There are some low-level focal uptake, SUV 2.9, close to the distal ureter which is not significantly enlarged, 3 to 4 mm diameter and indeterminate.    MEDICATIONS: Medication reconciliation for the patient was reviewed and confirmed in the electronic medical record.    Review of Systems   All other systems reviewed and are negative.    Karnofsky score: 90     Physical Exam  Vitals and nursing note reviewed.   Constitutional:       Appearance: He is well-developed.   HENT:      Head: Normocephalic and atraumatic.   Cardiovascular:      Rate and Rhythm: Normal rate and regular rhythm.      Heart sounds: Normal heart sounds. No murmur heard.  Pulmonary:      Effort: Pulmonary effort is normal.      Breath sounds: Normal breath sounds. No wheezing or rales.   Abdominal:      General: Bowel sounds are normal. There is no distension.      Palpations: Abdomen is soft.      Tenderness: There is no abdominal tenderness.   Musculoskeletal:         General: No tenderness. Normal range of motion.      Cervical back: Normal range of motion and neck supple.   Lymphadenopathy:      Cervical: No cervical adenopathy.      Upper Body:      Right upper body: No supraclavicular adenopathy.      Left upper body: No supraclavicular adenopathy.   Skin:     General: Skin is warm and dry.   Neurological:      Mental Status: He is alert and oriented to person, place, and time.      Sensory: No sensory deficit.   Psychiatric:         Behavior: Behavior normal.         Thought Content: Thought content normal.         Judgment: Judgment normal.       VITAL SIGNS:   Vitals:    09/14/23 1453   BP: 138/74   Pulse: 62   Resp: 20   Temp: 97.4 °F (36.3 °C)   TempSrc: Temporal   SpO2: 98%   Weight: 98.5 kg (217 lb 3.2 oz)   Height: 176.5 cm (69.5\") "   PainSc: 0-No pain             Karnofsky score: 90         IPSS Questionnaire (AUA-7):  Over the past month…    1)  Incomplete Emptying:       How often have you had a sensation of not emptying you had the sensation of not emptying your bladder completely after you finished urinating?  0 - Not at all   2)  Frequency:       How often have you had the urinate again less than two hours after you finished urinating?  0 - Not at all   3)  Intermittency:       How often have you found you stopped and started again several times when you urinated?   0 - Not at all   4) Urgency:      How often have you found it difficult to postpone urination?  0 - Not at all   5) Weak Stream:      How often have you had a weak urinary stream?  0 - Not at all   6) Straining:       How often have you had to push or strain to begin urination?  0 - Not at all   7) Nocturia:      How many times did you most typically get up to urinate from the time you went to bed at night until the time you got up in the morning?  1 - 1 time   Total Score:  1   The International Prostate Symptom Score (IPSS) is used to screen, diagnose, track symptoms of benign prostatic hyperplasia (BPH).   0-7 (Mild Symptoms) 8-19 (Moderate) 20-35 (Severe)   Quality of Life (QoL):  If you were to spend the rest of your life with your urinary condition just the way it is now, how would you feel about that? 1-Pleased   Urine Leakage (Incontinence) 1-Mild (A few drops a day, no pad use)          Sexual Health Inventory for Men (DEJUAN)   Over the past 6 months:     1. How do you rate your confidence that you could get and keep an erection?  3 - Moderate   2. When you had erections with sexual     stimulation, how often were your erections hard enough for penetration (entering your partner)?  4 - Most Times     3. During sexual intercourse, how often were you able to maintain your erection after you had penetrated (entered) your partner?  4 - Most times ( much more than, half the  time)   4. During sexual intercourse, how difficult was it to maintain your erection to completion of intercourse?  4 - Sightly difficult    5. When you attempted sexual intercourse, how often was it satisfactory for you?  4 - Most times ( much more than, half the time)    Total Score: 19   The Sexual Health Inventory for Men further classifies ED severity with the following breakpoints:   1-7 (Severe ED) 8-11 (Moderate ED) 12-16 (Mild to Moderate ED) 17-21 (Mild ED)       Bowel Health Inventory:    0 - No problems, no rectal bleeding, no discharge, less than 5 bowel movements a day      KSP %:  100    The following portions of the patient's history were reviewed and updated as appropriate: allergies, current medications, past family history, past medical history, past social history, past surgical history and problem list.         Diagnoses and all orders for this visit:    1. Secondary and unspecified malignant neoplasm of intra-abdominal lymph nodes (Primary)    2. Prostate cancer  -     Ambulatory Referral to Oncology         IMPRESSION: Oligometastatic prostate cancer, 8-1/2 years after initial prostatectomy for node positive disease.  He briefly tried androgen ablation but did not like the side effects.  He is 2 years status post salvage pelvic irradiation.  He is more than 1 year status post stereotactic body radiotherapy for 2 focal PET positive metastatic sites.  These have resolved.  He again has rising PSA up to a value of 8.1 ng/ml and 2 definite foci of new, enlarging, PET positive lymph nodes in previously untreated areas of upper and lower periaortic chain.    RECOMMENDATIONS: The new periaortic lymph node metastases would be amenable to treatment with stereotactic body radiotherapy again.  I believe he should also receive at least a short course of concurrent androgen ablation.  Patient is reluctant to receive additional androgen suppression; however, I am afraid that radiotherapy alone, although  likely to control known targeted disease, is not likely to give him any lasting body wide tumor control.  The alternative would be to use androgen ablation alone, and the patient is not willing to consider this lifelong treatment option.  I discussed this with his nephew, Dr. Angel, who is a urologist.  He is in agreement with patient needing androgen ablation and trying to use focal radiotherapy to perhaps limit the duration of current androgen ablation use to achieve the desired goal of disease remission.  I will arrange medical oncology consultation as well for participation in discussion of androgen ablation and systemic treatment options.  We could consider oral androgen suppression agent Relugolix, which may be quicker to stop and reverse side effects, if needed; however, I am not sure he can obtain this medication once he returns to [facility] where he lives part of the year.  Patient will return soon for further discussion, informed consent and simulation for stereotactic body radiotherapy.  The new periaortic tumors located at L2 and L4 vertebral body levels will likely receive a dose of 25-35 Gray delivered over 5 sessions on the CyberKnife unit, on an every other day treatment schedule.    I spent a total of 50 minutes on todays visit, with more than 30 minutes in direct face to face communication, and the remainder of the time spent in reviewing the relevant history, records, available imaging, and for documentation.    Return in about 8 days (around 9/22/2023) for Office Visit, Simulation.    Niraj Otoole MD

## 2023-09-14 NOTE — PROGRESS NOTES
Caller would like to have a sooner appointment. Writer advised caller of callback within 24-72 hours.    Patient Name: Helen Arreola  Caller Name:  Patient  Name of Facility:   Callback Number: [phone]  Best Availability: anytime, okay to leave a voicemail  Fax Number:   Additional Info: Currently scheduled for 8-, but would like to be seen sooner.  Did you confirm the message with the caller?: yes    Thank you,  Brittni Rainey     FOLLOW UP NOTE    PATIENT:                                                      Jeb Russo  MEDICAL RECORD #:                        [id number]  :                                                          1950  COMPLETION DATE:   2022    DIAGNOSIS:     Prostate cancer  - Stage BENNIE (pT2, pN1, cM0, PSA: 5, Grade Group: 2)      BRIEF HISTORY:    ***    MEDICATIONS: Medication reconciliation for the patient was reviewed and confirmed in the electronic medical record.    Review of Systems - Oncology        IPSS Questionnaire (AUA-7):  Over the past month…    1)  Incomplete Emptying  How often have you had a sensation of not emptying your bladder?  {Ratin}   2)  Frequency  How often have you had to urinate less than every two hours? {Ratin}   3)  Intermittency  How often have you found you stopped and started again several times when you urinated?  {Ratin}   4) Urgency  How often have you found it difficult to postpone urination?  {Ratin}   5) Weak Stream  How often have you had a weak urinary stream?  {Ratin}   6) Straining  How often have you had to push or strain to begin urination?  {Ratin}   7) Nocturia  How many times did you typically get up at night to urinate?  {Ratin}   Total Score:  {0-35:06797}       Quality of life due to urinary symptoms:  If you were to spend the rest of your life with your urinary condition the way it is now, how would you feel about that? {Ratin}   Urine Leakage (Incontinence) {Ratin}     Sexual Health Inventory  Current Status    1)  How do you rate your confidence that you could achieve and keep an erection? {Ratin}   2) When you had erections with sexual stimulation, how often were your erections hard enough for penetration (entering your partner)? {Ratin}   3)  During sexual intercourse, how often were you able to maintain your erection after you had penetrated (entered) into your partner?  {Ratin}   4) During sexual intercourse, how difficult was it to maintain your erection to completion of intercourse? {Ratin}   5) When you attempted sexual intercourse, how often was it satisfactory to you? {Ratin}   Total Score: {Numbers; 1-25:24825}       Bowel Health Inventory  Current Status: {Grade:44644}            Physical Exam    VITAL SIGNS: There were no vitals filed for this visit.                {KSP (Optional):53756}    The following portions of the patient's history were reviewed and updated as appropriate: allergies, current medications, past family history, past medical history, past social history, past surgical history and problem list.         There are no diagnoses linked to this encounter.     IMPRESSION:  ***    RECOMMENDATIONS:  ***    I spent a total of *** minutes on todays visit, with more than *** minutes in direct face to face communication, and the remainder of the time spent in reviewing the relevant history, records, available imaging, and for documentation.    No follow-ups on file.    Lorrie Prakash RN

## 2023-09-18 ENCOUNTER — TELEPHONE (OUTPATIENT)
Dept: RADIATION ONCOLOGY | Facility: HOSPITAL | Age: 73
End: 2023-09-18
Payer: MEDICARE

## 2023-09-18 ENCOUNTER — TELEPHONE (OUTPATIENT)
Dept: ONCOLOGY | Facility: CLINIC | Age: 73
End: 2023-09-18
Payer: MEDICARE

## 2023-09-18 ENCOUNTER — TELEPHONE (OUTPATIENT)
Dept: ONCOLOGY | Facility: CLINIC | Age: 73
End: 2023-09-18

## 2023-09-18 NOTE — TELEPHONE ENCOUNTER
Pt's sister called asking what the plan was for treatment because pt has tickets to go back out of the country on 9/26/2023.  Called Ms. Angel back and explained a referral had been made to Dr.Lee Gan and that appointment is on 9/25/2023 @ 3:15 pm. I explained that  will discuss systemic treatment options including a hormone injection.  I explained that whatever med he prescribes they will have to get a prior auth and usually that takes about a week.  I also gave her an appointment with  on 9/26/23 @ 2pm with CT simulation at 3pm. She asked if the appointment with Dr. Gan could be expedited.  I explained that his nurse gave me the first available appointment they had.  I advised her to move or cancel his plane reservation.  Ms. Angel verbalized understanding.

## 2023-09-18 NOTE — TELEPHONE ENCOUNTER
Caller: Italo Angel    Relationship to patient: Emergency Contact    Best call back number: [phone]    Type of visit: NEW PT VISIT    Requested date: ANY TIME SOONER DUE TO PATIENT NEEDING TO LEAVE THE COUNTRY    If rescheduling, when is the original appointment: 9/25/23

## 2023-09-18 NOTE — TELEPHONE ENCOUNTER
Received call from radiation oncology RN Che requesting a consult appt for patient. Gave her Dr. Gan first opening of 9/25/23 at 3:15. Sent in basket to scheduling to get patient added on.

## 2023-09-18 NOTE — TELEPHONE ENCOUNTER
Sister given new appt with Dr. Nilo Keyes 9/20/2023 @ 10am  Dr.Beckman Pang 9/22 @ 8:30am with CT simulation at 9am.  She verbalized understanding.

## 2023-09-20 ENCOUNTER — CONSULT (OUTPATIENT)
Dept: ONCOLOGY | Facility: CLINIC | Age: 73
End: 2023-09-20
Payer: MEDICARE

## 2023-09-20 VITALS
DIASTOLIC BLOOD PRESSURE: 72 MMHG | WEIGHT: 215 LBS | TEMPERATURE: 96.9 F | OXYGEN SATURATION: 98 % | BODY MASS INDEX: 32.58 KG/M2 | SYSTOLIC BLOOD PRESSURE: 133 MMHG | RESPIRATION RATE: 18 BRPM | HEART RATE: 59 BPM | HEIGHT: 68 IN

## 2023-09-20 DIAGNOSIS — C61 PROSTATE CANCER: Primary | ICD-10-CM

## 2023-09-20 NOTE — PROGRESS NOTES
ID: 73 y.o. year old male from [facility] [id number]    PCP: Jacqueline Torres PA-C    REFERRING PHYSICIAN: No ref. provider found    Reason for Consultation: Recurrent prostate cancer    Dear Dr. Otoole and Ms. Torres    It is a pleasure to meet Mr. Russo today.  He is a very pleasant 73-year-old gentleman who presents today for consultation for metastatic prostate cancer.  He was initially diagnosed in 2014 at the [facility] and [place].  He underwent a prostatectomy with number of lymph nodes taken at that time.  He did well until he had chemical recurrence for which he underwent radiotherapy to the prostatic bed.  He also had some pelvic nodes that were positive later and had treatments with CyberKnife to those regions.  Now he has some failure with an increasing PSA in the periaortic region.  He is undergoing CyberKnife to these nodes also.  He presents today to discuss systemic therapy for his metastatic prostate cancer.  He lives most of the time in [place].  Though he [place] return to the [place] periodically.  He is relatively asymptomatic.  In the past he is not been especially fond of hormonal blockade due to some of its side effects.      Past Medical History:   Diagnosis Date    Cancer     prostate    Coronary artery disease     Encounter for pacemaker at end of battery life 04/02/2021    Added automatically from request for surgery [id number]    Prostate cancer        Past Surgical History:   Procedure Laterality Date    ANKLE SURGERY      CARDIAC ELECTROPHYSIOLOGY PROCEDURE N/A 04/05/2021    Procedure: PPM generator change - dual  [employer];  Surgeon: Valentina Saini MD;  Location:  [facility];  Service: Cardiology;  Laterality: N/A;    CARDIAC ELECTROPHYSIOLOGY PROCEDURE N/A 11/01/2021    Procedure: Pocket Revision to submuscular, no meds to hold;  Surgeon: Anant Jaramillo MD;  Location:  [facility];  Service: Cardiology;  Laterality: N/A;    COLONOSCOPY       CYBERKNIFE  2022    Metastatic left iliac LN, left periaortic LN    INSERT / REPLACE / REMOVE PACEMAKER      KNEE ACL RECONSTRUCTION      KNEE ACL RECONSTRUCTION      PROSTATE SURGERY  2015       Social History     Socioeconomic History    Marital status:    Tobacco Use    Smoking status: Former     Packs/day: 0.50     Years: 1.00     Pack years: 0.50     Types: Cigarettes     Quit date:      Years since quittin.7    Smokeless tobacco: Never   Vaping Use    Vaping Use: Never used   Substance and Sexual Activity    Alcohol use: Yes     Alcohol/week: 1.0 standard drink     Types: 1 Glasses of wine per week     Comment: weekly    Drug use: No    Sexual activity: Defer       Family History   Problem Relation Age of Onset    Thyroid disease Mother     Thyroid disease Father        Review of Systems:    16 point review of systems was performed and reviewed and scanned into the EMR    Review of Systems - Oncology      Current Outpatient Medications:     B Complex Vitamins (VITAMIN B COMPLEX PO), Take  by mouth Every Other Day., Disp: , Rfl:     COLLAGEN PO, Take  by mouth., Disp: , Rfl:     Multiple Vitamins-Minerals (MULTIVITAMIN ADULT PO), Take 15 mg by mouth Daily., Disp: , Rfl:     vitamin D3 125 MCG (5000 UT) capsule capsule, Take 1 capsule by mouth Daily., Disp: , Rfl:     Zinc 30 MG tablet, Take 1 tablet by mouth Daily., Disp: , Rfl:          No Known Allergies      ECOG score: 0           Objective     Vitals:    23 1003   BP: 133/72   Pulse: 59   Resp: 18   Temp: 96.9 °F (36.1 °C)   SpO2: 98%     Body mass index is 33.18 kg/m².  Body surface area is 2.1 meters squared.        23  1003   Weight: 97.5 kg (215 lb)     Pain Score    23 1003   PainSc: 0-No pain          Physical Exam    General: well appearing, in no acute distress  HEENT: sclera anicteric, oropharynx clear, neck is supple  Lymphatics: no cervical, supraclavicular, or axillary adenopathy  Cardiovascular:  regular rate and rhythm, no murmurs, rubs or gallops  Lungs: clear to auscultation bilaterally  Abdomen: soft, nontender, nondistended.  No palpable organomegaly  Extremities: no lower extremity edema  Skin: no rashes, lesions, bruising, or petechiae  Msk:  Shows no weakness of the large muscle groups  Psych: Mood is stable        Lab Results   Component Value Date    GLUCOSE 108 (H) 09/11/2023    BUN 14 09/11/2023    CREATININE 0.94 09/11/2023     09/11/2023    K 4.9 09/11/2023     09/11/2023    CO2 20.0 (L) 09/11/2023    CALCIUM 9.8 09/11/2023    PROTEINTOT 6.6 09/11/2023    ALBUMIN 4.0 09/11/2023    BILITOT 0.8 09/11/2023    ALKPHOS 65 09/11/2023    AST 31 09/11/2023    ALT 21 09/11/2023       Lab Results   Component Value Date    HGB 17.0 09/11/2023    HCT 51.1 (H) 09/11/2023    MCV 86.6 09/11/2023     09/11/2023    WBC 6.30 09/11/2023    NEUTROABS 3.63 05/29/2019    LYMPHSABS 2.53 05/29/2019    MONOSABS 0.57 05/29/2019    EOSABS 0.30 05/29/2019    BASOSABS 0.07 05/29/2019     Lab Results   Component Value Date    PSA 8.110 (H) 09/11/2023    PSA 2.080 04/13/2021    PSA 1.960 03/31/2021    PSA 0.221 05/29/2019    PSA 0.100 11/22/2017         NM PET/CT Skull Base to Mid Thigh    Result Date: 9/14/2023  Impression: 1. 3 small, newly hypermetabolic foci in the left mid abdomen and pelvis, corresponding to normal sized lymph nodes on CT scan, but suspicious for metastatic disease. 2. The 2 previously noted small hypermetabolic lymph nodes on the 7/20/2022 exam are no longer identified Electronically Signed: Davy Garland MD  9/14/2023 3:07 PM EDT  Workstation ID: [id number]        Assessment      1.  Recurrent prostate cancer with metastatic disease in the perirectal region.  He is a fairly small areas in the mid abdomen.  He does have an elevated PSA of 8.  This has risen over the last several months.  He is undergoing radiotherapy with Dr. Otoole.  From a systemic treatment standpoint I recommended  chemical castration with Lupron every 6 months if feasible.  I would recommend addition of apalutamide.  For convenience it may be better to treat him with Relugolix with apalutamide.  I am going to start him with 1 dose of Lupron for 6-month dose.  He is going to consider apalutamide and Relugolix.  I reviewed the scans with him and his sister today.  It is unclear when he is going to return from [facility].  He is planning to go to [place] next week after which in October he is planning to return to [facility].  I discussed the rationale behind the treatments.      Total time of patient care on day of service including time prior to, face to face with patient, and following visit spent in reviewing records, lab results, imaging studies, discussion with patient, and documentation/charting was > 70 minutes.            Thank you for allowing me to participate in the care of this patient.    Yours sincerely,    Marielos Acosta MD  [facility]  Hematology and Oncology         Orders Placed This Encounter   Procedures    PSA     Standing Status:   Future     Standing Expiration Date:   9/24/2024     Order Specific Question:   Release to patient     Answer:   Routine Release [[id number]]

## 2023-09-21 ENCOUNTER — HOSPITAL ENCOUNTER (OUTPATIENT)
Dept: CARDIOLOGY | Facility: HOSPITAL | Age: 73
Discharge: HOME | End: 2023-09-21
Admitting: PHYSICIAN ASSISTANT
Payer: MEDICARE

## 2023-09-21 VITALS — WEIGHT: 214.95 LBS | BODY MASS INDEX: 32.58 KG/M2 | HEIGHT: 68 IN

## 2023-09-21 DIAGNOSIS — R06.02 SOB (SHORTNESS OF BREATH): ICD-10-CM

## 2023-09-21 DIAGNOSIS — I49.5 SICK SINUS SYNDROME: ICD-10-CM

## 2023-09-21 PROCEDURE — 25010000002 SULFUR HEXAFLUORIDE MICROSPH 60.7-25 MG RECONSTITUTED SUSPENSION: Performed by: PHYSICIAN ASSISTANT

## 2023-09-21 PROCEDURE — 93306 TTE W/DOPPLER COMPLETE: CPT

## 2023-09-21 RX ADMIN — SULFUR HEXAFLUORIDE 4 ML: KIT at 12:01

## 2023-09-22 ENCOUNTER — APPOINTMENT (OUTPATIENT)
Dept: RADIATION ONCOLOGY | Facility: HOSPITAL | Age: 73
End: 2023-09-22
Payer: MEDICARE

## 2023-09-22 ENCOUNTER — OFFICE VISIT (OUTPATIENT)
Dept: RADIATION ONCOLOGY | Facility: HOSPITAL | Age: 73
End: 2023-09-22
Payer: MEDICARE

## 2023-09-22 VITALS
HEART RATE: 66 BPM | RESPIRATION RATE: 20 BRPM | BODY MASS INDEX: 33.22 KG/M2 | WEIGHT: 215.4 LBS | TEMPERATURE: 97.6 F | SYSTOLIC BLOOD PRESSURE: 132 MMHG | OXYGEN SATURATION: 98 % | DIASTOLIC BLOOD PRESSURE: 61 MMHG

## 2023-09-22 DIAGNOSIS — C61 PROSTATE CANCER: Primary | ICD-10-CM

## 2023-09-22 DIAGNOSIS — C77.2 SECONDARY AND UNSPECIFIED MALIGNANT NEOPLASM OF INTRA-ABDOMINAL LYMPH NODES: ICD-10-CM

## 2023-09-22 LAB
BH CV ECHO MEAS - AO MAX PG: 5.2 MMHG
BH CV ECHO MEAS - AO MEAN PG: 3 MMHG
BH CV ECHO MEAS - AO ROOT AREA (BSA CORRECTED): 1.8 CM2
BH CV ECHO MEAS - AO ROOT DIAM: 3.7 CM
BH CV ECHO MEAS - AO V2 MAX: 114 CM/SEC
BH CV ECHO MEAS - AO V2 VTI: 23.2 CM
BH CV ECHO MEAS - AVA(I,D): 2.7 CM2
BH CV ECHO MEAS - EDV(CUBED): 169.1 ML
BH CV ECHO MEAS - EDV(MOD-SP2): 133 ML
BH CV ECHO MEAS - EDV(MOD-SP4): 111 ML
BH CV ECHO MEAS - EF(MOD-BP): 55 %
BH CV ECHO MEAS - EF(MOD-SP2): 58.6 %
BH CV ECHO MEAS - EF(MOD-SP4): 53.2 %
BH CV ECHO MEAS - ESV(CUBED): 78.4 ML
BH CV ECHO MEAS - ESV(MOD-SP2): 55 ML
BH CV ECHO MEAS - ESV(MOD-SP4): 52 ML
BH CV ECHO MEAS - FS: 22.6 %
BH CV ECHO MEAS - IVS/LVPW: 1.06 CM
BH CV ECHO MEAS - IVSD: 1.14 CM
BH CV ECHO MEAS - LA DIMENSION: 5.1 CM
BH CV ECHO MEAS - LV DIASTOLIC VOL/BSA (35-75): 53.2 CM2
BH CV ECHO MEAS - LV MASS(C)D: 247.2 GRAMS
BH CV ECHO MEAS - LV MAX PG: 3.7 MMHG
BH CV ECHO MEAS - LV MEAN PG: 2 MMHG
BH CV ECHO MEAS - LV SYSTOLIC VOL/BSA (12-30): 24.9 CM2
BH CV ECHO MEAS - LV V1 MAX: 96.1 CM/SEC
BH CV ECHO MEAS - LV V1 VTI: 17.8 CM
BH CV ECHO MEAS - LVIDD: 5.5 CM
BH CV ECHO MEAS - LVIDS: 4.3 CM
BH CV ECHO MEAS - LVOT AREA: 3.5 CM2
BH CV ECHO MEAS - LVOT DIAM: 2.1 CM
BH CV ECHO MEAS - LVPWD: 1.08 CM
BH CV ECHO MEAS - MV A MAX VEL: 72.4 CM/SEC
BH CV ECHO MEAS - MV DEC TIME: 0.16 SEC
BH CV ECHO MEAS - MV E MAX VEL: 82.8 CM/SEC
BH CV ECHO MEAS - MV E/A: 1.14
BH CV ECHO MEAS - PA ACC SLOPE: 639 CM/SEC2
BH CV ECHO MEAS - PA ACC TIME: 0.1 SEC
BH CV ECHO MEAS - RAP SYSTOLE: 3 MMHG
BH CV ECHO MEAS - RVSP: 22 MMHG
BH CV ECHO MEAS - SI(MOD-SP2): 37.4 ML/M2
BH CV ECHO MEAS - SI(MOD-SP4): 28.3 ML/M2
BH CV ECHO MEAS - SV(LVOT): 61.7 ML
BH CV ECHO MEAS - SV(MOD-SP2): 78 ML
BH CV ECHO MEAS - SV(MOD-SP4): 59 ML
BH CV ECHO MEAS - TAPSE (>1.6): 1.2 CM
BH CV ECHO MEAS - TR MAX PG: 19.2 MMHG
BH CV ECHO MEAS - TR MAX VEL: 211.5 CM/SEC
BH CV VAS BP LEFT ARM: NORMAL MMHG
BH CV XLRA - RV BASE: 4.9 CM
BH CV XLRA - RV LENGTH: 8.5 CM
BH CV XLRA - RV MID: 3.4 CM
BH CV XLRA - TDI S': 6.77 CM/SEC
LEFT ATRIUM VOLUME INDEX: 49.8 ML/M2

## 2023-09-22 PROCEDURE — G0463 HOSPITAL OUTPT CLINIC VISIT: HCPCS

## 2023-09-22 NOTE — PROGRESS NOTES
RE-EVALUATION    PATIENT:                                                      Jeb Russo  :                                                          1950  DATE:                          2023   DIAGNOSIS:     Prostate cancer  - Stage BENNIE (pT2, pN1, cM0, PSA: 5, Grade Group: 2)         BRIEF HISTORY:  The patient is a very pleasant 73 y.o. male  with oligometastatic prostate cancer.   He has rising PSA values up to maximum 8.1 ng/ml on 2023.   Pylarify PSMA PET/CT identified new hypermetabolic small periaortic lymphadenopathy anterior to L2 and L4 vertebral body levels.  We discussed treatment options on her recent office visit and I recommended initiation of androgen ablation with the option of adding additional stereotactic body radiotherapy to the new pathologic lymph node groups.  We made medical oncology referral and he saw Dr. Acosta.  He has ordered LHRH agonist injection 6-month Depo which is to be given 2023.  He also discussed adding apalutamide or other systemic treatments.  Patient has discussed these issues further with his nephew who is a urologist.  He wishes to initiate androgen ablation but to postpone any other systemic agents or involved field stereotactic body radiotherapy at this time.      Not on File    Review of Systems   All other systems reviewed and are negative.        Objective   VITAL SIGNS:   Vitals:    23 1309   BP: 132/61   Pulse: 66   Resp: 20   Temp: 97.6 °F (36.4 °C)   TempSrc: Skin   SpO2: 98%   Weight: 97.7 kg (215 lb 6.4 oz)   PainSc: 0-No pain        Karnofsky score: 90   KSP %:  90    Physical Exam         The following portions of the patient's history were reviewed and updated as appropriate: allergies, current medications, past family history, past medical history, past social history, past surgical history, and problem list.    Diagnoses and all orders for this visit:    Prostate cancer    Secondary and unspecified malignant neoplasm  of intra-abdominal lymph nodes      IMPRESSION:  Oligometastatic prostate cancer, 8-1/2 years after initial prostatectomy for node positive disease.  He briefly tried androgen ablation but did not like the side effects.  He is 2 years status post salvage pelvic irradiation.  He is more than 1 year status post stereotactic body radiotherapy for 2 focal PET positive metastatic sites.  These have resolved.  He again has rising PSA up to a value of 8.1 ng/ml and 2 definite foci of new, enlarging, PET positive lymph nodes in previously untreated areas of upper and lower periaortic chain.    RECOMMENDATIONS: He is planned to re-initiate androgen ablation with 6-month LHRH agonist injection on 9/25/2023.  He wishes to use that as a sole modality initially without adding apalutamide or other systemic agents.  He wishes to postpone stereotactic body radiotherapy.  This approach will give us additional information to evaluate for hormone sensitivity of his current disease.  We can always add additional treatments in the future if we do not get adequate tumor response from LHRH agonist.  Patient plans to leave the state on 9/26/2023 to visit his son for 10 days in [place] and then to return to [facility] for the remainder of 6-month.  I will schedule return appointment in 6 months with PSA testing.  At that time we will discuss and coordinate any additional treatments versus continuation of androgen ablation as a sole modality.       Niraj Otoole MD    Approximate 15 minutes was spent with patient and sister during this visit.

## 2023-09-25 ENCOUNTER — HOSPITAL ENCOUNTER (OUTPATIENT)
Dept: ONCOLOGY | Facility: HOSPITAL | Age: 73
Discharge: HOME | End: 2023-09-25
Admitting: INTERNAL MEDICINE

## 2023-09-25 DIAGNOSIS — C61 PROSTATE CANCER: Primary | ICD-10-CM

## 2023-09-25 PROCEDURE — 96401 CHEMO ANTI-NEOPL SQ/IM: CPT

## 2023-09-25 PROCEDURE — 25010000002 LEUPROLIDE (6 MONTH) PER 7.5 MG: Performed by: INTERNAL MEDICINE

## 2023-09-25 PROCEDURE — 96402 CHEMO HORMON ANTINEOPL SQ/IM: CPT

## 2023-09-25 PROCEDURE — 84153 ASSAY OF PSA TOTAL: CPT | Performed by: INTERNAL MEDICINE

## 2023-09-25 RX ADMIN — LEUPROLIDE ACETATE 45 MG: KIT SUBCUTANEOUS at 14:28

## 2023-09-26 LAB — PSA SERPL-MCNC: 7.73 NG/ML (ref 0–4)

## 2023-12-27 ENCOUNTER — DOCUMENTATION (OUTPATIENT)
Dept: RADIATION ONCOLOGY | Facility: HOSPITAL | Age: 73
End: 2023-12-27
Payer: MEDICARE

## 2023-12-27 ENCOUNTER — TELEPHONE (OUTPATIENT)
Dept: RADIATION ONCOLOGY | Facility: HOSPITAL | Age: 73
End: 2023-12-27
Payer: MEDICARE

## 2023-12-27 NOTE — TELEPHONE ENCOUNTER
Pt's sister called stating pt's PSA is 8.2 and she would like to get in to see Dr. Otoole.  She states he did get his hormone injection in Sept.  Discussed with  and called sister back and asked her to email me a copy of the PSA.  I explained  is on vacation and can see her on Rober 3 @ 3:30pm.  Ms. Angel verbalized understanding.

## 2024-01-03 ENCOUNTER — HOSPITAL ENCOUNTER (OUTPATIENT)
Dept: RADIATION ONCOLOGY | Facility: HOSPITAL | Age: 74
Setting detail: RADIATION/ONCOLOGY SERIES
Discharge: HOME | End: 2024-01-03
Payer: MEDICARE

## 2024-01-03 ENCOUNTER — OFFICE VISIT (OUTPATIENT)
Dept: RADIATION ONCOLOGY | Facility: HOSPITAL | Age: 74
End: 2024-01-03
Payer: MEDICARE

## 2024-01-03 DIAGNOSIS — C61 PROSTATE CANCER: ICD-10-CM

## 2024-01-03 DIAGNOSIS — C77.2 SECONDARY AND UNSPECIFIED MALIGNANT NEOPLASM OF INTRA-ABDOMINAL LYMPH NODES: Primary | ICD-10-CM

## 2024-01-03 PROCEDURE — G0463 HOSPITAL OUTPT CLINIC VISIT: HCPCS

## 2024-01-03 NOTE — PROGRESS NOTES
FOLLOW UP NOTE    PATIENT:                                                      Jeb Russo  MEDICAL RECORD #:                        [id number]  :                                                          1950  COMPLETION DATE:   2023  DIAGNOSIS:     Prostate cancer  - Stage BENNIE (pT2, pN1, cM0, PSA: 5,  Group: 2)      BRIEF HISTORY:    Telehealth visit with patient via video chat with sister in clinic exam room facilitating.  In 2023 he was found to have PSA 8.1 ng/ml on 2023 and Pylarify PSMA PET scan identifying new hypermetabolic periaortic lymphadenopathy.  He received a 6-month Depo LHRH agonist injection on 2023.  He is exercising regularly and spite of some fatigue and hormone ablation type symptoms.  He has been able to lose 22 pounds over the past 3 months through diet and exercise.  He overall feels well.  He has no significant change in bowel or bladder function.  No new aches or pains.  He is taking supplemental replacement vitamin D3 as well as multivitamin, B complex vitamin, magnesium and collagen.  PSA drawn in [facility] 2023 was 8.2 ng/ml.    MEDICATIONS: Medication reconciliation for the patient was reviewed and confirmed in the electronic medical record.    Review of Systems - Oncology          Physical Exam    VITAL SIGNS: There were no vitals filed for this visit.                KSP %:  90    The following portions of the patient's history were reviewed and updated as appropriate: allergies, current medications, past family history, past medical history, past social history, past surgical history and problem list.         Diagnoses and all orders for this visit:    1. Secondary and unspecified malignant neoplasm of intra-abdominal lymph nodes (Primary)    2. Prostate cancer         IMPRESSION:  Oligometastatic prostate cancer, 8-1/2 years after initial prostatectomy for node positive disease.  He is 2 more than years status post salvage pelvic  irradiation.  He is more than 1 year status post stereotactic body radiotherapy for 2 focal PET positive metastatic sites.   He initiated androgen ablation 3 months ago for rising PSA up to a value of 8.1 ng/ml and 2 definite foci of new, enlarging, PET positive lymph nodes in previously untreated areas of upper and lower periaortic chain.  He appears to be tolerating hormone suppression reasonably well at this time.  He overall feels well.  Unfortunately, PSA has remained elevated with most recent value 8.2 ng/ml on 12/26/2023.  This may indicate hormone resistant disease.    RECOMMENDATIONS: Repeat PSA later this month, approximately 1/26/2024.  Patient and sister will send us the results.  Repeat telehealth visit after PSA.  If PSA remains elevated, especially if it is still climbing, he will likely need additional systemic treatment.  With patient's permission I will also discuss plan of action with his nephew, Dr. Katya Angel, a urologist.  Patient would be open to outside referral to a prostate-specific medical oncologist such as Dr. Mario Alberto [facility] at [facility].  Patient would also be open to participation in clinical trial in the future.    I spent a total of 15 minutes on todays visit, with more than 10 minutes in direct face to face communication, and the remainder of the time spent in reviewing the relevant history, records, available imaging, and for documentation.    Return in about 4 weeks (around 1/31/2024) for PSA prior to next TeleHealth visit.    Niraj Otoole MD

## 2024-01-31 ENCOUNTER — DOCUMENTATION (OUTPATIENT)
Dept: RADIATION ONCOLOGY | Facility: HOSPITAL | Age: 74
End: 2024-01-31
Payer: MEDICARE

## 2024-01-31 NOTE — PROGRESS NOTES
Telephone patient today with patient's sister, Italo.    PSA recently drawn in [place] has increased to value 9.2 ng/ml.  He is due for his next 6-month LHRH agonist injection on 3/25/2024.  Patient plans to return to [place], [place] for further evaluation of his rising PSA and metastatic prostate cancer.  Dr. Foley has set up consultation with medical oncologist Dr. Floyd Foss.  They will cancel the previously scheduled telehealth visit with me and reschedule an in person follow-up visit with me and stated when he is in town.

## 2024-02-29 ENCOUNTER — TRANSCRIBE ORDERS (OUTPATIENT)
Dept: ADMINISTRATIVE | Facility: HOSPITAL | Age: 74
End: 2024-02-29
Payer: MEDICARE

## 2024-02-29 DIAGNOSIS — C61 PROSTATE CANCER: Primary | ICD-10-CM

## 2024-03-14 ENCOUNTER — LAB (OUTPATIENT)
Dept: LAB | Facility: HOSPITAL | Age: 74
End: 2024-03-14
Payer: MEDICARE

## 2024-03-14 DIAGNOSIS — R97.21 INCREASING PROSTATE SPECIFIC ANTIGEN (PSA) LEVEL AFTER TREATMENT FOR MALIGNANT NEOPLASM OF PROSTATE: Primary | ICD-10-CM

## 2024-03-14 LAB — PSA SERPL-MCNC: 10.3 NG/ML (ref 0–4)

## 2024-03-14 PROCEDURE — 36415 COLL VENOUS BLD VENIPUNCTURE: CPT

## 2024-03-14 PROCEDURE — 84153 ASSAY OF PSA TOTAL: CPT

## 2024-03-15 ENCOUNTER — HOSPITAL ENCOUNTER (OUTPATIENT)
Dept: PET IMAGING | Facility: HOSPITAL | Age: 74
Discharge: HOME | End: 2024-03-15
Payer: MEDICARE

## 2024-03-15 DIAGNOSIS — C61 PROSTATE CANCER: ICD-10-CM

## 2024-03-15 PROCEDURE — 0 PIFLUFOLASTAT F 18 9 MCI SOLUTION PREFILLED SYRINGE: Performed by: INTERNAL MEDICINE

## 2024-03-15 PROCEDURE — A9595 PIFLUFOLASTAT F 18 9 MCI SOLUTION PREFILLED SYRINGE: HCPCS | Performed by: INTERNAL MEDICINE

## 2024-03-15 PROCEDURE — 78815 PET IMAGE W/CT SKULL-THIGH: CPT

## 2024-03-15 RX ADMIN — PIFLUFOLASTAT F-18 1 DOSE: 80 INJECTION INTRAVENOUS at 13:36

## 2024-03-19 DIAGNOSIS — C61 PROSTATE CANCER: ICD-10-CM

## 2024-03-19 DIAGNOSIS — C77.2 SECONDARY AND UNSPECIFIED MALIGNANT NEOPLASM OF INTRA-ABDOMINAL LYMPH NODES: Primary | ICD-10-CM

## 2024-03-25 ENCOUNTER — SPECIALTY PHARMACY (OUTPATIENT)
Dept: ONCOLOGY | Facility: HOSPITAL | Age: 74
End: 2024-03-25
Payer: MEDICARE

## 2024-03-25 ENCOUNTER — HOSPITAL ENCOUNTER (OUTPATIENT)
Dept: ONCOLOGY | Facility: HOSPITAL | Age: 74
Discharge: HOME | End: 2024-03-25
Admitting: INTERNAL MEDICINE
Payer: MEDICARE

## 2024-03-25 ENCOUNTER — CONSULT (OUTPATIENT)
Dept: ONCOLOGY | Facility: CLINIC | Age: 74
End: 2024-03-25
Payer: MEDICARE

## 2024-03-25 VITALS
DIASTOLIC BLOOD PRESSURE: 82 MMHG | SYSTOLIC BLOOD PRESSURE: 133 MMHG | TEMPERATURE: 96.9 F | RESPIRATION RATE: 18 BRPM | BODY MASS INDEX: 32.2 KG/M2 | WEIGHT: 208.8 LBS | OXYGEN SATURATION: 97 % | HEART RATE: 68 BPM

## 2024-03-25 DIAGNOSIS — C61 PROSTATE CANCER: Primary | ICD-10-CM

## 2024-03-25 DIAGNOSIS — Z79.818 ANDROGEN DEPRIVATION THERAPY: ICD-10-CM

## 2024-03-25 PROBLEM — IMO0001 ANDROGEN DEPRIVATION THERAPY: Status: ACTIVE | Noted: 2024-03-25

## 2024-03-25 LAB
ALBUMIN SERPL-MCNC: 4.3 G/DL (ref 3.5–5.2)
ALBUMIN/GLOB SERPL: 1.9 G/DL
ALP SERPL-CCNC: 85 U/L (ref 39–117)
ALT SERPL W P-5'-P-CCNC: 35 U/L (ref 1–41)
ANION GAP SERPL CALCULATED.3IONS-SCNC: 10 MMOL/L (ref 5–15)
AST SERPL-CCNC: 32 U/L (ref 1–40)
BASOPHILS # BLD AUTO: 0.04 10*3/MM3 (ref 0–0.2)
BASOPHILS NFR BLD AUTO: 0.6 % (ref 0–1.5)
BILIRUB SERPL-MCNC: 0.5 MG/DL (ref 0–1.2)
BUN SERPL-MCNC: 19 MG/DL (ref 8–23)
BUN/CREAT SERPL: 22.4 (ref 7–25)
CALCIUM SPEC-SCNC: 10.1 MG/DL (ref 8.6–10.5)
CHLORIDE SERPL-SCNC: 98 MMOL/L (ref 98–107)
CO2 SERPL-SCNC: 26 MMOL/L (ref 22–29)
CREAT BLDA-MCNC: 1 MG/DL (ref 0.6–1.3)
CREAT SERPL-MCNC: 0.85 MG/DL (ref 0.76–1.27)
DEPRECATED RDW RBC AUTO: 41 FL (ref 37–54)
EGFRCR SERPLBLD CKD-EPI 2021: 91.2 ML/MIN/1.73
EOSINOPHIL # BLD AUTO: 0.2 10*3/MM3 (ref 0–0.4)
EOSINOPHIL NFR BLD AUTO: 2.8 % (ref 0.3–6.2)
ERYTHROCYTE [DISTWIDTH] IN BLOOD BY AUTOMATED COUNT: 12.7 % (ref 12.3–15.4)
GLOBULIN UR ELPH-MCNC: 2.3 GM/DL
GLUCOSE SERPL-MCNC: 84 MG/DL (ref 65–99)
HCT VFR BLD AUTO: 53.6 % (ref 37.5–51)
HGB BLD-MCNC: 17.5 G/DL (ref 13–17.7)
IMM GRANULOCYTES # BLD AUTO: 0.05 10*3/MM3 (ref 0–0.05)
IMM GRANULOCYTES NFR BLD AUTO: 0.7 % (ref 0–0.5)
LYMPHOCYTES # BLD AUTO: 1.73 10*3/MM3 (ref 0.7–3.1)
LYMPHOCYTES NFR BLD AUTO: 24.4 % (ref 19.6–45.3)
MAGNESIUM SERPL-MCNC: 2.5 MG/DL (ref 1.6–2.4)
MCH RBC QN AUTO: 28.2 PG (ref 26.6–33)
MCHC RBC AUTO-ENTMCNC: 32.6 G/DL (ref 31.5–35.7)
MCV RBC AUTO: 86.3 FL (ref 79–97)
MONOCYTES # BLD AUTO: 0.59 10*3/MM3 (ref 0.1–0.9)
MONOCYTES NFR BLD AUTO: 8.3 % (ref 5–12)
NEUTROPHILS NFR BLD AUTO: 4.48 10*3/MM3 (ref 1.7–7)
NEUTROPHILS NFR BLD AUTO: 63.2 % (ref 42.7–76)
PHOSPHATE SERPL-MCNC: 2.8 MG/DL (ref 2.5–4.5)
PLATELET # BLD AUTO: 204 10*3/MM3 (ref 140–450)
PMV BLD AUTO: 9.3 FL (ref 6–12)
POTASSIUM SERPL-SCNC: 4.8 MMOL/L (ref 3.5–5.2)
PROT SERPL-MCNC: 6.6 G/DL (ref 6–8.5)
RBC # BLD AUTO: 6.21 10*6/MM3 (ref 4.14–5.8)
SODIUM SERPL-SCNC: 134 MMOL/L (ref 136–145)
WBC NRBC COR # BLD AUTO: 7.09 10*3/MM3 (ref 3.4–10.8)

## 2024-03-25 PROCEDURE — 96402 CHEMO HORMON ANTINEOPL SQ/IM: CPT

## 2024-03-25 PROCEDURE — 99215 OFFICE O/P EST HI 40 MIN: CPT | Performed by: INTERNAL MEDICINE

## 2024-03-25 PROCEDURE — 25010000002 LEUPROLIDE (6 MONTH) PER 7.5 MG: Performed by: INTERNAL MEDICINE

## 2024-03-25 PROCEDURE — 82565 ASSAY OF CREATININE: CPT

## 2024-03-25 PROCEDURE — 96365 THER/PROPH/DIAG IV INF INIT: CPT

## 2024-03-25 PROCEDURE — 80053 COMPREHEN METABOLIC PANEL: CPT | Performed by: INTERNAL MEDICINE

## 2024-03-25 PROCEDURE — 1160F RVW MEDS BY RX/DR IN RCRD: CPT | Performed by: INTERNAL MEDICINE

## 2024-03-25 PROCEDURE — 25010000002 ZOLEDRONIC ACID 4 MG/100ML SOLUTION: Performed by: INTERNAL MEDICINE

## 2024-03-25 PROCEDURE — G2211 COMPLEX E/M VISIT ADD ON: HCPCS | Performed by: INTERNAL MEDICINE

## 2024-03-25 PROCEDURE — 1159F MED LIST DOCD IN RCRD: CPT | Performed by: INTERNAL MEDICINE

## 2024-03-25 PROCEDURE — 85025 COMPLETE CBC W/AUTO DIFF WBC: CPT | Performed by: INTERNAL MEDICINE

## 2024-03-25 PROCEDURE — 84100 ASSAY OF PHOSPHORUS: CPT | Performed by: INTERNAL MEDICINE

## 2024-03-25 PROCEDURE — 83735 ASSAY OF MAGNESIUM: CPT | Performed by: INTERNAL MEDICINE

## 2024-03-25 PROCEDURE — 25810000003 SODIUM CHLORIDE 0.9 % SOLUTION: Performed by: INTERNAL MEDICINE

## 2024-03-25 PROCEDURE — 1126F AMNT PAIN NOTED NONE PRSNT: CPT | Performed by: INTERNAL MEDICINE

## 2024-03-25 PROCEDURE — 96374 THER/PROPH/DIAG INJ IV PUSH: CPT

## 2024-03-25 RX ORDER — ONDANSETRON HYDROCHLORIDE 8 MG/1
8 TABLET, FILM COATED ORAL 3 TIMES DAILY PRN
Qty: 30 TABLET | Refills: 5 | Status: SHIPPED | OUTPATIENT
Start: 2024-03-25

## 2024-03-25 RX ORDER — SODIUM CHLORIDE 9 MG/ML
20 INJECTION, SOLUTION INTRAVENOUS ONCE
Status: CANCELLED | OUTPATIENT
Start: 2024-03-25

## 2024-03-25 RX ORDER — ZOLEDRONIC ACID 0.04 MG/ML
4 INJECTION, SOLUTION INTRAVENOUS ONCE
Status: COMPLETED | OUTPATIENT
Start: 2024-03-25 | End: 2024-03-25

## 2024-03-25 RX ORDER — APALUTAMIDE 240 MG/1
240 TABLET, FILM COATED ORAL DAILY
Qty: 30 TABLET | Refills: 5 | Status: SHIPPED | OUTPATIENT
Start: 2024-04-07 | End: 2024-03-25 | Stop reason: SDUPTHER

## 2024-03-25 RX ORDER — SODIUM CHLORIDE 9 MG/ML
20 INJECTION, SOLUTION INTRAVENOUS ONCE
Status: COMPLETED | OUTPATIENT
Start: 2024-03-25 | End: 2024-03-25

## 2024-03-25 RX ORDER — APALUTAMIDE 240 MG/1
240 TABLET, FILM COATED ORAL DAILY
Qty: 30 TABLET | Refills: 5 | Status: SHIPPED | OUTPATIENT
Start: 2024-04-07 | End: 2024-03-26 | Stop reason: SDUPTHER

## 2024-03-25 RX ADMIN — ZOLEDRONIC ACID 4 MG: 0.04 INJECTION, SOLUTION INTRAVENOUS at 10:36

## 2024-03-25 RX ADMIN — LEUPROLIDE ACETATE 45 MG: 45 INJECTION, SUSPENSION, EXTENDED RELEASE SUBCUTANEOUS at 10:36

## 2024-03-25 RX ADMIN — SODIUM CHLORIDE 20 ML/HR: 9 INJECTION, SOLUTION INTRAVENOUS at 10:30

## 2024-03-25 NOTE — PROGRESS NOTES
I completed an independent review of the medication order and prescription. I agree with Dr. Cuba' assessment and what is in her note.  I confirm she sent the apalutamide prescription to [facility] as described.      Ami Santiago, PharmD, [facility] - Oncology Clinical Pharmacist [phone]    3/25/2024  14:05 EDT

## 2024-03-25 NOTE — PROGRESS NOTES
CHIEF COMPLAINT: Vishnu metastatic prostate cancer    Problem List:  Oncology/Hematology History Overview Note   1.  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  Salvage external beam radiation 6400 cGy in 32 fractions ending 11/12/2021.  Stereotactic radiotherapy to 2 focal positive metastatic sites 1 year later.  PSA 8 September 2023.  Lupron started 45 mg every 6 months.  September 2023 PSA still 8.  September 23 PET showed 3 small newly hypermetabolic foci left mid abdomen and pelvis corresponding to normal-sized lymph nodes on CT suspicious for metastatic disease and to previously noted hypermetabolic nodes July 2022 no longer identified.  January 2024 PSA 9.2 3/15/2024 Pylarify PET shows increase in size and uptake of retroperitoneal adenopathy consistent with metastatic disease.  3/18/2024 PSA 11.5.  3/18/2024 follow-up with Dr. Grossman medical oncology.  He recommended Erleada or Xtandi but no chemotherapy.  Plan molecular testing with next generation sequencing and genetic counseling.    -3/25/2024 [facility] oncology follow-up: The patient had seen my partner Dr. Corcoran back in September and subsequently Dr. Otoole and in the meantime had seen Dr. Grossman at [facility].  I reviewed the above recommendations from Dr. Grossman and I confirmed with the patient that those are fine recommendations.  He also has the option of Zytiga prednisone which we discussed and I also discussed in addition the need for Zometa every 6 months to prevent osteoporosis.  He is willing to try what ever I recommend and I would lean towards Erleada.  We discussed the side effects in detail and he will have a cancer treatment preparation visit with our pharmacists.  I would not for the moment go after this with CyberKnife unless he developed refractory symptomatic spots of disease.  I would not do next generation  sequencing at this junction on his tissue from about 10 years ago but would procure tissue when he gets to the hormone refractory phase of his treatment and would send serum as well as tissue NexGen sequencing at that point.  We will make referrals to genetic testing which is standard for all patients with metastatic prostate cancer and may afford targets for therapy in the future as well.  He understands the above plan and is willing to proceed.  He will get his second Lupron shot today.He will see my nurse practitioner monthly with blood count chemistry and PSA and make sure he is tolerating this and in 3 months we will repeat the PSMA PET.  If we are making the progress that we need to see then perhaps we can back off on the frequency of the labs and the visits but I want to make sure he tolerates this well as we do have lateral hormonal blockade options.     Prostate cancer   2/4/2015 Cancer Staged    Staging form: Prostate, AJCC 8th Edition  - Pathologic stage from 2/4/2015: Stage BENNIE (pT2, pN1, cM0, PSA: 5, Grade Group: 2) - Signed by Niraj Otoole MD on 9/20/2021 9/20/2021 Initial Diagnosis    Prostate cancer (CMS/HCC)     9/30/2021 - 11/12/2021 Radiation    Radiation OncologyTreatment Course:  Jeb Russo received 6400 cGy in 32 fractions to prostate gland via External Beam Radiation - EBRT.     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         HISTORY OF PRESENT ILLNESS:  The patient is a 73 y.o. male, here for follow up on management of iain metastatic prostate  cancer.  History as above.  Felt fatigued with his Lupron but that has abated.  Now with rising PSA and enlarging and more PET avid nodes    Past Medical History:   Diagnosis Date    Cancer     prostate    Coronary artery disease     Encounter for pacemaker at end of battery life 04/02/2021    Added automatically from request for surgery [id number]    Prostate cancer      Past Surgical History:   Procedure Laterality Date    ANKLE SURGERY      CARDIAC ELECTROPHYSIOLOGY PROCEDURE N/A 04/05/2021    Procedure: PPM generator change - dual  [employer];  Surgeon: Valentina Saini MD;  Location:  [facility];  Service: Cardiology;  Laterality: N/A;    CARDIAC ELECTROPHYSIOLOGY PROCEDURE N/A 11/01/2021    Procedure: Pocket Revision to submuscular, no meds to hold;  Surgeon: Anant Jaramillo MD;  Location:  [facility];  Service: Cardiology;  Laterality: N/A;    COLONOSCOPY      CYBERKNIFE  08/12/2022    Metastatic left iliac LN, left periaortic LN    INSERT / REPLACE / REMOVE PACEMAKER  2009    KNEE ACL RECONSTRUCTION      KNEE ACL RECONSTRUCTION      PROSTATE SURGERY  2015       No Known Allergies    Family History and Social History reviewed and changed as necessary    REVIEW OF SYSTEM:   No new somatic complaints    PHYSICAL EXAM:  No jaundice icterus or pallor.  No respiratory distress.    Vitals:    03/25/24 0839   BP: 133/82   Pulse: 68   Resp: 18   Temp: 96.9 °F (36.1 °C)   TempSrc: Temporal   SpO2: 97%   Weight: 94.7 kg (208 lb 12.8 oz)     There were no vitals filed for this visit.       ECOG score: 0           Vitals reviewed.    ECOG: (1) Restricted in Physically Strenuous Activity, Ambulatory & Able to Do Work of Light Nature    Lab Results   Component Value Date    HGB 17.0 09/11/2023    HCT 51.1 (H) 09/11/2023    MCV 86.6 09/11/2023     09/11/2023    WBC 6.30 09/11/2023    NEUTROABS 3.63 05/29/2019    LYMPHSABS 2.53 05/29/2019    MONOSABS 0.57 05/29/2019    EOSABS 0.30  05/29/2019    BASOSABS 0.07 05/29/2019       Lab Results   Component Value Date    GLUCOSE 108 (H) 09/11/2023    BUN 14 09/11/2023    CREATININE 0.94 09/11/2023     09/11/2023    K 4.9 09/11/2023     09/11/2023    CO2 20.0 (L) 09/11/2023    CALCIUM 9.8 09/11/2023    PROTEINTOT 6.6 09/11/2023    ALBUMIN 4.0 09/11/2023    BILITOT 0.8 09/11/2023    ALKPHOS 65 09/11/2023    AST 31 09/11/2023    ALT 21 09/11/2023             ASSESSMENT & PLAN:  1.  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  Salvage external beam radiation 6400 cGy in 32 fractions ending 11/12/2021.  Stereotactic radiotherapy to 2 focal positive metastatic sites 1 year later.  PSA 8 September 2023.  Lupron started 45 mg every 6 months.  September 2023 PSA still 8.  September 23 PET showed 3 small newly hypermetabolic foci left mid abdomen and pelvis corresponding to normal-sized lymph nodes on CT suspicious for metastatic disease and to previously noted hypermetabolic nodes July 2022 no longer identified.  January 2024 PSA 9.2 3/15/2024 Pylarify PET shows increase in size and uptake of retroperitoneal adenopathy consistent with metastatic disease.  3/18/2024 PSA 11.5.  3/18/2024 follow-up with Dr. Grossman medical oncology.  He recommended Erleada or Xtandi but no chemotherapy.  Plan molecular testing with next generation sequencing and genetic counseling.    -3/25/2024 [facility] medical oncology follow-up: The patient had seen my partner Dr. Corcoran back in September and subsequently Dr. Otoole and in the meantime had seen Dr. Grossman at [facility].  I reviewed the above recommendations from Dr. Grossman and I confirmed with the patient that those are fine recommendations.  He also has the option of Zytiga prednisone which we discussed and I also discussed in addition the need for Zometa every 6 months to prevent osteoporosis.  He is  willing to try what ever I recommend and I would lean towards Erleada.  We discussed the side effects in detail and he will have a cancer treatment preparation visit with our pharmacists.  I would not for the moment go after this with CyberKnife unless he developed refractory symptomatic spots of disease.  I would not do next generation sequencing at this junction on his tissue from about 10 years ago but would procure tissue when he gets to the hormone refractory phase of his treatment and would send serum as well as tissue NexGen sequencing at that point.  We will make referrals to genetic testing which is standard for all patients with metastatic prostate cancer and may afford targets for therapy in the future as well.  He understands the above plan and is willing to proceed.  He will get his second Lupron shot today.  He will see my nurse practitioner monthly with blood count chemistry and PSA and make sure he is tolerating this and in 3 months we will repeat the PSMA PET.  If we are making the progress that we need to see then perhaps we can back off on the frequency of the labs and the visits but I want to make sure he tolerates this well as we do have lateral hormonal blockade options.    Total time of care today inclusive of time spent today prior to his arrival reviewing past records from my partner and from outside medical oncologist and from radiation oncologist and during visit reviewing all of this with the patient and interviewing him as to signs or symptoms of his disease and the potential treatment options thereof and after visit instituting this plan took 70 minutes of patient care time throughout the day today.  Bright Gan MD    03/25/2024

## 2024-03-25 NOTE — PROGRESS NOTES
Oral Chemotherapy - New Referral    Received a referral from Dr. Gan    Treatment Plan: Erleada (apalutamide) + zoledronic acid every 6 months + continue Lupron every 6 months  Start date of treatment planned for: As soon as oral specialty medication is available.  Indication: Metastatic castration-sensitive prostate cancer   Relevant past treatments:    Prostatectomy (9/2021)  EBRT (11/2021)  Stereotactic RT to 2 focal positive metastatic sites (11/2022)  Lupron started 9/2023  3/15/24 - PET shows increase in size and uptake of retroperitoneal adenopathy consistent with metastatic disease  Is the therapy appropriate based on treatment guidelines and FDA labeling?: yes  Therapeutic Goals: Continue treatment until progression or intolerable toxicity  Patient can self-administer oral medications: Yes    Drug-Drug Interactions: The current medication list was reviewed and there are no relevant drug-drug interactions with the specialty medication.  Medication Allergies: The patient has NKDA  Review of Labs/Dose Adjustments: The patient's most recent labs were reviewed and all are WNL to start treatment at this dose.   Will educate patient to start taking calcium at education session. Patient is already taking Vitamin D3 daily.    A prescription was released to [facility] for   Drug: Erleada (apalutamide)  Strength: 240 mg  Directions: Take 1 tablet by mouth daily  Quantity: 30  Refills: 5    Pharmacy education is scheduled for 4/1/24., CCA and consent will be signed at that time.    Due to insurance restrictions, prescription sent to [facility].    Due to high copay at [facility], prescription has been sent to [facility] for in-house assistance.    Kendra Valdez PharmD, [facility]  Clinical Oncology Pharmacy Specialist  Phone: [phone]      3/25/2024  11:53 EDT

## 2024-03-25 NOTE — LETTER
March 25, 2024       No Recipients    Patient: Jeb Russo   YOB: 1950   Date of Visit: 3/25/2024     Dear Jacqueline Torres PA-C:       Thank you for referring Jeb Russo to me for evaluation. Below are the relevant portions of my assessment and plan of care.    If you have questions, please do not hesitate to call me. I look forward to following Jeb along with you.         Sincerely,        Bright Gan MD        CC:   No Recipients    Bright Gan MD  03/25/24 0952  Sign when Signing Visit  CHIEF COMPLAINT: Vishnu metastatic prostate cancer    Problem List:  Oncology/Hematology History Overview Note   1.  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  Salvage external beam radiation 6400 cGy in 32 fractions ending 11/12/2021.  Stereotactic radiotherapy to 2 focal positive metastatic sites 1 year later.  PSA 8 September 2023.  Lupron started 45 mg every 6 months.  September 2023 PSA still 8.  September 23 PET showed 3 small newly hypermetabolic foci left mid abdomen and pelvis corresponding to normal-sized lymph nodes on CT suspicious for metastatic disease and to previously noted hypermetabolic nodes July 2022 no longer identified.  January 2024 PSA 9.2 3/15/2024 Pylarify PET shows increase in size and uptake of retroperitoneal adenopathy consistent with metastatic disease.  3/18/2024 PSA 11.5.  3/18/2024 follow-up with Dr. Grossman medical oncology.  He recommended Erleada or Xtandi but no chemotherapy.  Plan molecular testing with next generation sequencing and genetic counseling.    -3/25/2024 [facility] medical oncology follow-up: The patient had seen my partner Dr. Corcoran back in September and subsequently Dr. Otoole and in the meantime had seen Dr. Grossman at [facility].  I reviewed the above recommendations from Dr. Grossman and I confirmed with the patient that those are  fine recommendations.  He also has the option of Zytiga prednisone which we discussed and I also discussed in addition the need for Zometa every 6 months to prevent osteoporosis.  He is willing to try what ever I recommend and I would lean towards Erleada.  We discussed the side effects in detail and he will have a cancer treatment preparation visit with our pharmacists.  I would not for the moment go after this with CyberKnife unless he developed refractory symptomatic spots of disease.  I would not do next generation sequencing at this junction on his tissue from about 10 years ago but would procure tissue when he gets to the hormone refractory phase of his treatment and would send serum as well as tissue NexGen sequencing at that point.  We will make referrals to genetic testing which is standard for all patients with metastatic prostate cancer and may afford targets for therapy in the future as well.  He understands the above plan and is willing to proceed.  He will get his second Lupron shot today.He will see my nurse practitioner monthly with blood count chemistry and PSA and make sure he is tolerating this and in 3 months we will repeat the PSMA PET.  If we are making the progress that we need to see then perhaps we can back off on the frequency of the labs and the visits but I want to make sure he tolerates this well as we do have lateral hormonal blockade options.     Prostate cancer   2/4/2015 Cancer Staged    Staging form: Prostate, AJCC 8th Edition  - Pathologic stage from 2/4/2015: Stage BENNIE (pT2, pN1, cM0, PSA: 5, Grade Group: 2) - Signed by Niraj Otoole MD on 9/20/2021 9/20/2021 Initial Diagnosis    Prostate cancer (CMS/[facility]ly [facility])     9/30/2021 - 11/12/2021 Radiation    Radiation OncologyTreatment Course:  Jeb Russo received 6400 cGy in 32 fractions to prostate gland via External Beam Radiation - EBRT.     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         HISTORY OF PRESENT ILLNESS:  The patient is a 73 y.o. male, here for follow up on management of iain metastatic prostate cancer.  History as above.  Felt fatigued with his Lupron but that has abated.  Now with rising PSA and enlarging and more PET avid nodes    Past Medical History:   Diagnosis Date   • Cancer     prostate   • Coronary artery disease    • Encounter for pacemaker at end of battery life 04/02/2021    Added automatically from request for surgery [id number]   • Prostate cancer      Past Surgical History:   Procedure Laterality Date   • ANKLE SURGERY     • CARDIAC ELECTROPHYSIOLOGY PROCEDURE N/A 04/05/2021    Procedure: PPM generator change - dual  [employer];  Surgeon: Valentina Saini MD;  Location:  [facility];  Service: Cardiology;  Laterality: N/A;   • CARDIAC ELECTROPHYSIOLOGY PROCEDURE N/A 11/01/2021    Procedure: Pocket Revision to submuscular, no meds to hold;  Surgeon: Anant Jaramillo MD;  Location:  [facility];  Service: Cardiology;  Laterality: N/A;   • COLONOSCOPY     • CYBERKNIFE  08/12/2022    Metastatic left iliac LN, left periaortic LN   • INSERT / REPLACE / REMOVE PACEMAKER  2009   • KNEE ACL RECONSTRUCTION     • KNEE ACL RECONSTRUCTION     • PROSTATE SURGERY  2015       No Known Allergies    Family History and Social History reviewed and changed as necessary    REVIEW OF SYSTEM:   No new somatic complaints    PHYSICAL EXAM:  No jaundice icterus or pallor.  No respiratory distress.    Vitals:    03/25/24 0839   BP: 133/82   Pulse: 68   Resp: 18   Temp:  96.9 °F (36.1 °C)   TempSrc: Temporal   SpO2: 97%   Weight: 94.7 kg (208 lb 12.8 oz)     There were no vitals filed for this visit.       ECOG score: 0           Vitals reviewed.    ECOG: (1) Restricted in Physically Strenuous Activity, Ambulatory & Able to Do Work of Light Nature    Lab Results   Component Value Date    HGB 17.0 09/11/2023    HCT 51.1 (H) 09/11/2023    MCV 86.6 09/11/2023     09/11/2023    WBC 6.30 09/11/2023    NEUTROABS 3.63 05/29/2019    LYMPHSABS 2.53 05/29/2019    MONOSABS 0.57 05/29/2019    EOSABS 0.30 05/29/2019    BASOSABS 0.07 05/29/2019       Lab Results   Component Value Date    GLUCOSE 108 (H) 09/11/2023    BUN 14 09/11/2023    CREATININE 0.94 09/11/2023     09/11/2023    K 4.9 09/11/2023     09/11/2023    CO2 20.0 (L) 09/11/2023    CALCIUM 9.8 09/11/2023    PROTEINTOT 6.6 09/11/2023    ALBUMIN 4.0 09/11/2023    BILITOT 0.8 09/11/2023    ALKPHOS 65 09/11/2023    AST 31 09/11/2023    ALT 21 09/11/2023             ASSESSMENT & PLAN:  1.  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  Salvage external beam radiation 6400 cGy in 32 fractions ending 11/12/2021.  Stereotactic radiotherapy to 2 focal positive metastatic sites 1 year later.  PSA 8 September 2023.  Lupron started 45 mg every 6 months.  September 2023 PSA still 8.  September 23 PET showed 3 small newly hypermetabolic foci left mid abdomen and pelvis corresponding to normal-sized lymph nodes on CT suspicious for metastatic disease and to previously noted hypermetabolic nodes July 2022 no longer identified.  January 2024 PSA 9.2 3/15/2024 Pylarify PET shows increase in size and uptake of retroperitoneal adenopathy consistent with metastatic disease.  3/18/2024 PSA 11.5.  3/18/2024 follow-up with Dr. Grossman medical oncology.  He recommended Erleada or Xtandi but no chemotherapy.  Plan molecular testing  with next generation sequencing and genetic counseling.    -3/25/2024 [facility] medical oncology follow-up: The patient had seen my partner Dr. Corcoran back in September and subsequently Dr. Otoole and in the meantime had seen Dr. Grossman at [facility].  I reviewed the above recommendations from Dr. Grossman and I confirmed with the patient that those are fine recommendations.  He also has the option of Zytiga prednisone which we discussed and I also discussed in addition the need for Zometa every 6 months to prevent osteoporosis.  He is willing to try what ever I recommend and I would lean towards Erleada.  We discussed the side effects in detail and he will have a cancer treatment preparation visit with our pharmacists.  I would not for the moment go after this with CyberKnife unless he developed refractory symptomatic spots of disease.  I would not do next generation sequencing at this junction on his tissue from about 10 years ago but would procure tissue when he gets to the hormone refractory phase of his treatment and would send serum as well as tissue NexGen sequencing at that point.  We will make referrals to genetic testing which is standard for all patients with metastatic prostate cancer and may afford targets for therapy in the future as well.  He understands the above plan and is willing to proceed.  He will get his second Lupron shot today.  He will see my nurse practitioner monthly with blood count chemistry and PSA and make sure he is tolerating this and in 3 months we will repeat the PSMA PET.  If we are making the progress that we need to see then perhaps we can back off on the frequency of the labs and the visits but I want to make sure he tolerates this well as we do have lateral hormonal blockade options.    Total time of care today inclusive of time spent today prior to his arrival reviewing past records from my partner and from outside medical oncologist and from radiation oncologist and during visit  reviewing all of this with the patient and interviewing him as to signs or symptoms of his disease and the potential treatment options thereof and after visit instituting this plan took 70 minutes of patient care time throughout the day today.  Bright Gan MD    03/25/2024

## 2024-03-26 RX ORDER — APALUTAMIDE 240 MG/1
240 TABLET, FILM COATED ORAL DAILY
Qty: 30 TABLET | Refills: 5 | Status: SHIPPED | OUTPATIENT
Start: 2024-04-07

## 2024-04-01 ENCOUNTER — OFFICE VISIT (OUTPATIENT)
Dept: ONCOLOGY | Facility: CLINIC | Age: 74
End: 2024-04-01
Payer: MEDICARE

## 2024-04-01 ENCOUNTER — CLINICAL SUPPORT (OUTPATIENT)
Dept: GENETICS | Facility: HOSPITAL | Age: 74
End: 2024-04-01

## 2024-04-01 ENCOUNTER — SPECIALTY PHARMACY (OUTPATIENT)
Dept: ONCOLOGY | Facility: HOSPITAL | Age: 74
End: 2024-04-01
Payer: MEDICARE

## 2024-04-01 ENCOUNTER — LAB (OUTPATIENT)
Dept: LAB | Facility: HOSPITAL | Age: 74
End: 2024-04-01

## 2024-04-01 ENCOUNTER — HOSPITAL ENCOUNTER (OUTPATIENT)
Dept: ONCOLOGY | Facility: HOSPITAL | Age: 74
Discharge: HOME | End: 2024-04-01

## 2024-04-01 VITALS
TEMPERATURE: 97.6 F | RESPIRATION RATE: 18 BRPM | HEIGHT: 68 IN | SYSTOLIC BLOOD PRESSURE: 148 MMHG | OXYGEN SATURATION: 97 % | HEART RATE: 58 BPM | WEIGHT: 214 LBS | DIASTOLIC BLOOD PRESSURE: 82 MMHG | BODY MASS INDEX: 32.43 KG/M2

## 2024-04-01 DIAGNOSIS — Z13.79 GENETIC TESTING: Primary | ICD-10-CM

## 2024-04-01 DIAGNOSIS — C61 PROSTATE CANCER: ICD-10-CM

## 2024-04-01 DIAGNOSIS — Z80.42 FAMILY HISTORY OF PROSTATE CANCER: ICD-10-CM

## 2024-04-01 DIAGNOSIS — C61 PROSTATE CANCER: Primary | ICD-10-CM

## 2024-04-01 PROCEDURE — 99214 OFFICE O/P EST MOD 30 MIN: CPT | Performed by: NURSE PRACTITIONER

## 2024-04-01 PROCEDURE — 1126F AMNT PAIN NOTED NONE PRSNT: CPT | Performed by: NURSE PRACTITIONER

## 2024-04-01 PROCEDURE — 1159F MED LIST DOCD IN RCRD: CPT | Performed by: NURSE PRACTITIONER

## 2024-04-01 PROCEDURE — 1160F RVW MEDS BY RX/DR IN RCRD: CPT | Performed by: NURSE PRACTITIONER

## 2024-04-01 PROCEDURE — 96040: CPT

## 2024-04-01 NOTE — PROGRESS NOTES
Specialty Pharmacy Patient Management Program  Oncology Initial Assessment       Jeb Russo is a 73 y.o. male with metastatic castration-sensitive prostate cancer seen by an Oncology provider and enrolled in the Oncology Patient Management program offered by [facility].  An initial outreach was conducted, including assessment of therapy appropriateness and specialty medication education for Erleada (apalutamide). The patient was introduced to services offered by [facility], including: regular assessments, refill coordination, curbside pick-up or mail order delivery options, prior authorization maintenance, and financial assistance programs as applicable. The patient was also provided with contact information for the pharmacy team.     Regimen: Erleada (apalutamide) 60 mg tablets - Take 4 tablets (240 mg) by mouth daily with or without food    Start date of oral specialty medication: As soon as oral specialty medication is available.    Relevant Past Medical History, Comorbidities, and Vaccines  Relevant medical history and concomitant health conditions were discussed with the patient. The patient's chart has been reviewed for relevant past medical history and comorbid health conditions and updated as necessary.  Vaccines are coordinated by the patient's oncologist and primary care provider.  Past Medical History:   Diagnosis Date    Cancer     prostate    Coronary artery disease     Encounter for pacemaker at end of battery life 2021    Added automatically from request for surgery [id number]    Prostate cancer      Social History     Socioeconomic History    Marital status:    Tobacco Use    Smoking status: Former     Current packs/day: 0.00     Average packs/day: 0.5 packs/day for 1 year (0.5 ttl pk-yrs)     Types: Cigarettes     Start date:      Quit date:      Years since quittin.2    Smokeless tobacco: Never   Vaping Use    Vaping  status: Never Used   Substance and Sexual Activity    Alcohol use: Yes     Alcohol/week: 1.0 standard drink of alcohol     Types: 1 Glasses of wine per week     Comment: weekly    Drug use: No    Sexual activity: Defer       Allergies  Known allergies and reactions were discussed with the patient. The patient's chart has been reviewed for allergy information and updated as necessary.   No Known Allergies     Current Medication List  This medication list has been reviewed with the patient and evaluated for any interactions or necessary modifications/recommendations, and updated to include all prescription medications, OTC medications, and supplements the patient is currently taking.  This list reflects what is contained in the patient's profile, which has also been marked as reviewed to communicate to other providers it is the most up to date version of the patient's current medication therapy.   Prior to Admission medications    Medication Sig Start Date End Date Taking? Authorizing Provider   Apalutamide (Erleada) 240 MG tablet Take 1 tablet by mouth daily with or without food. Do not crush or chew tablets. 4/7/24   Bright Gan MD   B Complex Vitamins (VITAMIN B COMPLEX PO) Take  by mouth Every Other Day.    Lory Roberto MD   COLLAGEN PO Take  by mouth.    Lory Roberto MD   Multiple Vitamins-Minerals (MULTIVITAMIN ADULT PO) Take 15 mg by mouth Daily.    Lory Roberto MD   ondansetron (ZOFRAN) 8 MG tablet Take 1 tablet by mouth 3 (Three) Times a Day As Needed for Nausea or Vomiting. 3/25/24   Bright Gan MD   vitamin D3 125 MCG (5000 UT) capsule capsule Take 1 capsule by mouth Daily.    Lory Roberto MD   Zinc 30 MG tablet Take 1 tablet by mouth Daily.    Lory Roberto MD   Apalutamide (Erleada) 240 MG tablet Take 1 tablet by mouth daily with or without food. Do not crush or chew tablets. 4/7/24 3/25/24  Bright Gan MD   Apalutamide (Erleada) 240 MG tablet Take 1  tablet by mouth daily with or without food. Do not crush or chew tablets. 4/7/24 3/26/24  Bright Gan MD       Drug Interactions  Reviewed concomitant medications, allergies, labs, comorbidities/medical history, and immunization history.   Drug-drug interactions noted and discussed during education: no significant drug interactions noted. . Reminded the patient to let us know before making any changes or starting any new prescription or OTC medications so we can first assess drug interactions.  Drug-food interactions: None    Recommended Medications Assessment  Bone Health (such as calcium/vitamin D, bisphosphonate, RANKL inhibitor) - Currently Taking The patient is currently on Zometa (zoledronic acid) every 6 months. He currently takes Vitamin D 5000 IU every 2-3 days.  I recommended either adding calcium at least 1000 mg/day or to purchase the OTC Caltrate-D product and take 1 tablet by mouth twice daily with food so that he's getting calcium and vitamin D.  VTE prophylaxis - Not Indicated   Prophylactic antimicrobials - Not Indicated     Relevant Laboratory Values  Lab Results   Component Value Date    GLUCOSE 84 03/25/2024    CALCIUM 10.1 03/25/2024     (L) 03/25/2024    K 4.8 03/25/2024    CO2 26.0 03/25/2024    CL 98 03/25/2024    BUN 19 03/25/2024    CREATININE 1.00 03/25/2024    EGFRIFNONA 75 11/01/2021    BCR 22.4 03/25/2024    ANIONGAP 10.0 03/25/2024     Lab Results   Component Value Date    WBC 7.09 03/25/2024    RBC 6.21 (H) 03/25/2024    HGB 17.5 03/25/2024    HCT 53.6 (H) 03/25/2024    MCV 86.3 03/25/2024    MCH 28.2 03/25/2024    MCHC 32.6 03/25/2024    RDW 12.7 03/25/2024    RDWSD 41.0 03/25/2024    MPV 9.3 03/25/2024     03/25/2024    NEUTRORELPCT 63.2 03/25/2024    LYMPHORELPCT 24.4 03/25/2024    MONORELPCT 8.3 03/25/2024    EOSRELPCT 2.8 03/25/2024    BASORELPCT 0.6 03/25/2024    AUTOIGPER 0.7 (H) 03/25/2024    NEUTROABS 4.48 03/25/2024    LYMPHSABS 1.73 03/25/2024    MONOSABS  0.59 03/25/2024    EOSABS 0.20 03/25/2024    BASOSABS 0.04 03/25/2024    AUTOIGNUM 0.05 03/25/2024    NRBC 0.0 05/29/2019       The above labs have been reviewed. No dose adjustments are needed for the oral specialty medication(s) based on the labs.    Initial Education Provided for Specialty Medication  The patient has been provided with the following education. All questions and concerns have been addressed prior to the patient receiving the medication, and the patient has verbalized understanding of the education and any materials provided.  Additional patient education shall be provided and documented upon request by the patient, provider or payer.      Provided patient with:   Chemo calendar to help improve adherence., Education sheets about the medication, 24-hour clinic phone number and my contact information and instructions to call should additional questions arise.     Medication Education Sheets Provided: (select all that apply)  Oral Specialty Medication: Erleada (apalutamide)  IV: Leuprolide and Zoledronic Acid - handouts included in his education materials but the content of our discussion focused on Erleada specifically    Other Education Sheets Provided: (select all that apply)  Adherence and Symptom Tracker Sheet and [facility] Information    TOPICS COMMENTS   Storage and Handling of Oral Specialty Medication Store in the original container, in a dry location out of direct sunlight, and out of reach of children or pets. and Store at room temperature.  Discussed safe handling and what to do with any unused medication.   Administration of Oral Specialty Medication Take with or without food at the same time(s) each day. and Do not crush or chew tablets.   Adherence to Oral Specialty Regimen and Handling Missed Doses Patient is likely to have good treatment adherence; reinforced the importance of adherence. Reviewed how to address missed doses and encouraged the patient to let us know of any missed doses.    Anemia: role of RBC, cause, s/s, ways to manage, role of transfusion Reviewed the role of RBC and the use of transfusions if hemoglobin decreases too much.  Patient to notify us if he experiences shortness of breath, dizziness, or palpitations.  Also let patient know that he could feel more tired than usual and to try to stay active, but rest if he needs to.    Neutropenia: role of WBC, cause, infection precautions, s/s of infection, when to call MD Reviewed the role of WBC, good infection prevention practices, and when to call the clinic (temperature 100.4F, sore throat, burning urination, etc).   Nutrition and Appetite Changes:  importance of maintaining healthy diet & weight, ways to manage to improve intake, dietary consult, exercise regimen, electrolyte and/or blood glucose abnormalities Lab abnormalities can occur such as elevated glucose, potassium, triglycerides, and cholesterol.  His PCP checks his lipid panel annually and his labs here will include glucose and potassium.   Nausea/Vomiting: cause, use of antiemetics, dietary changes, when to call MD Emetic risk: Minimal  Premeds: None  Scheduled meds: None  PRN home meds: Ondansetron 8 mg PO TID PRN N/V  Pharmacy home meds sent to: [facility] [address]    Instructed the patient to take a dose of the PRN medication at the first onset of nausea and if it's not working to call us for additional medications.  Also provided non-drug measures to mitigate nausea.   Nervous System Changes: causes, s/s, neuropathies, cognitive changes, ways to manage discussed the rare, but serious risk of seizures and the signs/symptoms   Pain: causes, ways to manage Discussed muscle and joint aches/pains or muscle weakness with therapies that lower testosterone. Recommended the use of OTC pain relief with ibuprofen or acetaminophen if needed.   Skin/Nail Changes: cause, s/s, ways to manage Discussed the potential for a rash or itchy skin, offered nonpharmacologic  prevention strategies, and instructed the patient to call if a rash develops and worsens.   Organ Toxicities: cause, s/s, need for diagnostic tests, labs, when to notify MD Discussed potential effects on organ systems, monitoring, diagnostic tests, labs, and when to notify the MD. Discussed the signs/symptoms of the following:  weakening of the bones (have started preventative medicines for this purpose), stroke, and cardiotoxicity.   Miscellaneous Financial Issues: [employer] is screening him for copay support  Lab Draws: On or before day 1 of each cycle, no sooner than 3 days early.   Infertility and Sexuality:  causes, fertility preservation options, sexuality changes, ways to manage, importance of birth control Oral Oncology Therapy: Reviewed safe sex practices and the importance of minimizing exposure to body fluids while on oral oncology therapy., The patient is not sexually active with a woman of childbearing potential.   Home Care: how to manage bodily fluids Counseled on management of soiled linens and proper flush technique.  Discussed how to manage all the side effects at home and advised when to contact the MD office     Adherence and Self-Administration  Barriers to Patient Adherence and/or Self-Administration: None  Methods for Supporting Patient Adherence and/or Self-Administration: dosing calendar  Expected duration of therapy: Until disease progression or intolerable toxicity    Goals of Therapy  Patient Goals of Therapy:   Consistently take medications as prescribed  Patient will adhere to medication regimen  Patient will report any medication side effects to healthcare provider  Clinical Goals:    Goals Addressed Today        Specialty Pharmacy General Goal      Clinical goal/therapeutic target: stable or decreasing PSA and disease control, per the recent oncology clinic notes and labs. {  PSA   Date Value Ref Range Status   03/14/2024 10.300 (H) 0.000 - 4.000 ng/mL Final   09/25/2023 7.730 (H) 0.000 -  4.000 ng/mL Final   09/11/2023 8.110 (H) 0.000 - 4.000 ng/mL Final   04/13/2021 2.080 0.000 - 4.000 ng/mL Final   03/31/2021 1.960 0.000 - 4.000 ng/mL Final   05/29/2019 0.221 0.000 - 4.000 ng/mL Final   11/22/2017 0.100 0.000 - 4.000 ng/mL Final   ]             Support patient understanding of medication regimen  Ensure patient knows the pharmacy contact information  Schedule regular follow-up to monitor the treatment serious adverse events  Schedule regular follow-up to confirm medication adherence  Schedule regular follow-up to monitor disease progression or stability    Reassessment Plan & Follow-Up  Pharmacist to perform regular reassessments no more than (6) months from the previous assessment.  Welcome information and patient satisfaction survey to be sent by retail team with patient's initial fill.  Care Coordinator to set up future refill outreaches, coordinate prescription delivery, and escalate clinical questions to pharmacist.     Additional Plans, Therapy Recommendations or Therapy Problems to Be Addressed: Denise will track the 1st shipment from [facility] and will confirm receipt and start date.     Attestation  I attest the patient was actively involved in and has agreed to the above plan of care. If the prescribed therapy is at any point deemed not appropriate based on the current or future assessments, a consultation will be initiated with the patient's specialty care provider to determine the best course of action. The revised plan of therapy will be documented along with any required assessments and/or additional patient education provided.     Ami Santiago, PharmD, [facility]  Oncology Clinical Pharmacist   Phone: [phone]    4/1/2024  15:15 EDT

## 2024-04-01 NOTE — PROGRESS NOTES
Date of Visit: 2024   Patient Name: Jeb Russo  : 1950   MRN: [id number]     Referring Provider: Bright Gan MD     REASON FOR CONSULT  Jeb Russo is a 73 y.o. male referred for genetic counseling due to a biochemical recurrence of his previous metastatic prostate cancer.  Mr. Russo was interested in discussing his risk for a hereditary cancer syndrome and genetic testing for cancer susceptibility.  In  at the age of 64 Mr. Russo was diagnosed with a metastatic prostatic adenocarcinoma [place] 3+4=7 with a  PSA level of 8.5.  A radical prostatectomy was performed.  In 2023 PSA level monitoring began to indicate a rise in his PSA levels that have reached a level of 8.1 indicating oligometastatic prostate cancer.  He has not tolerated androgen ablation well and is currently undergoing radiation therapy.  He also has a personal history of an unknown number of serrated colon polyps.  Mr. Russo elected to pursue genetic testing via [employer]'s Common Hereditary Cancers panel with the add-on RNF43 gene associated with serrated polyposis syndrome with RNA analyzing 49-cancer risk genes.    PERTINENT FAMILY HISTORY:  A cancer-focused four-generation pedigree was obtained via patient reporting    Maternal Uncle - prostate cancer, 96  Maternal 1st Cousin (son of CHIOMA) - prostate cancer, 60's  Maternal 2nd Cousin - breast cancer, early 50's    RISK ASSESSMENT  Mr. Russo's personal diagnosis of metastatic prostate cancer and reported family history is suggestive of hereditary cancer risk.  Mr. Russo meets NCCN guidelines criteria for genetic testing based on his personal history of metastatic prostate cancer and, there are 3 total prostate cancer diagnoses in his family history between himself, his maternal uncle, and maternal first cousin.  A significant proportion (>50%) of hereditary breast and ovarian cancer can be attributed to mutations in the BRCA1 and BRCA2 genes.   Males with mutations in BRCA1 can have a lifetime prostate cancer risk up to 26%, while BRCA2 mutations can increase this risk up to 61%.  The general population lifetime risk for prostate cancer is 12% - 13%.  Mutations in these genes can also increase the lifetime risk of male breast cancer up to 0.2% - 7.1%, while the general population lifetime male breast cancer is is ~0.1%.  Females with BRCA1/2 mutations are also at a significantly increased risk for breast cancer as well as ovarian cancer.  Mutations in these genes confer an increased risk for pancreatic cancer as well.  There are other clinically significant cancer related genes, as well as other, more rare, hereditary cancer syndromes than can increase risk for prostate cancer. The degree of risk and screening recommendations vary by gene, the individual's age, and related family history.    GENETIC COUNSELING  We reviewed the family history information in detail. Cases of cancer follow three general patterns: sporadic, familial, and hereditary.  While most cancer cases are sporadic (~70% of cancer cases), some cases appear to occur in family clusters.  These cases are said to be familial and account for around 20% of cancer cases.  Familial cases may be due to a combination of shared genes and environmental factors among family members that we cannot evaluate via genetic testing at this time.  In 5% - 10% of cancer cases, the risk for cancer is inherited, and the genes responsible for the increased cancer risk are known.      Family histories typical of hereditary cancer syndromes usually include multiple first- and second-degree relatives diagnosed with cancer types that define a syndrome.  These cases tend to be diagnosed at younger-than-expected ages and can be bilateral or multifocal.  The cancer in these families follows an autosomal dominant inheritance pattern, which indicates the likely presence of a mutation in a cancer gene.  Children and  "siblings of an individual carrying a mutation have a 50% chance of inheriting that mutation, thereby inheriting the increased risk to develop cancer.  However, it is not recommended to pursue genetic testing for adult-onset cancers in children as the results would not have clinical utility until some time in adulthood among other ethical reasons.  These mutations can be passed down from the maternal or the paternal lineage.    We discussed that risk factors or \"red flags\" for hereditary risk include cancers diagnosed at earlier-than-average ages, multiple family members diagnosed with cancers associated with mutations in the same gene, or multiple generations of associated cancers. Dependent on the specific cancer type or syndrome, there exists the potential for several clinically significant genes related to the cancer's onset or increased risk for development.  Therefore, the standard approach to hereditary cancer genetic testing at this time is via multi-gene panel analyzing several genes associated with a hereditary risk for cancer.  Once results are completed, we will review them in the context of the patient's personal and family history to determine what, if any, post-test cancer risk management changes are recommended.  When affected relatives are unavailable or unwilling to pursue genetic testing, it is reasonable to offer genetic testing to an unaffected individual.      GENETIC TESTING  The risks, benefits, and limitations of genetic testing and implications for clinical management following testing were reviewed.  The implications of a positive, negative, or VUS test result were discussed.  Genetic test results can influence decisions regarding screening and prevention.  Genetic testing can have significant psychological implications for both individuals and families. Also discussed was the possibility of insurance discrimination based on genetic test results and the laws in place to prevent this, as " "well as the limitations of these laws.      The Genetic Information Nondiscrimination Act (FRAN) is a federal law enacted in May 2008.  FRAN prohibits discrimination on the basis of genetic information such as genetic test results with respect to health insurance and employment status.  Under Title 1 of FRAN, health insurance companies are prohibited from using an individual's genetic information to change premiums, drop or change an individual's coverage, or prevent coverage from being acquired.  Title 2 of FRAN prohibits employers from using genetic information in employment decisions such as, but not limited to, hiring, firing, promotions, pay, and job assignments.  FRAN protections do not protect against genetic discrimination by life insurance, long-term care or disability insurance,  service members, federal employees, those using the [employer] [facility], or those employed by a small business with fewer than 15 employees.    We discussed panel testing, which could involve testing numerous genes associated with increased cancer risk.  With multigene panel testing, it is not uncommon for a variant of uncertain significance (VUS) to be identified.  Variants are termed \"VUS\" when there is not sufficient evidence to classify the variant as a negative (not harmful) variant or a positive (harmful) mutation. Genetic testing labs work to reclassify all VUSs overtime and will issue an updated report when a reclassification occurs.  The majority (over 90%) of VUSs that are reclassified are found to be negative genetic variants, however a small percentage will be upgraded to a harmful mutation. Clinically, a VUS is treated as a negative result.  If genetic testing is negative or a variant of uncertain significance (VUS) is found, management should be guided by a family history-based risk assessment.  Medical care should not be altered based on a VUS result.  Genetic testing for other unaffected (never had " cancer) relatives is not recommended for a VUS.    We discussed that there are limitations to a negative genetic test result.  If Mr. Russo tests negative it could be for several different reasons such as:    Mr. Russo could have a pathogenic variant in one of the genes tested for that was not detected. Current testing will identify the overwhelming majority of pathogenic variants, but some are not detectable with current technology.   Mr. Russo may carry a pathogenic variant in another gene associated with hereditary cancer predisposition that was not tested for, or the risk in the family may be due to other genetic factors that are not well understood.    ASSESSMENT AND PLAN  Mr. Russo meets NCCN guidelines for genetic testing.  We reviewed the options for genetic testing including a multi-gene panel of high risk genes, a panel of genes with known management guidelines, or a broader approach including more newly discovered genes. We reviewed the benefits and drawbacks of this approach, including finding mutations in genes that are less well understood, or results that are unexpected based on this family history.  Mr. Russo elected to pursue genetic testing.  [employer]'s Common Hereditary Cancers panel with RNA was ordered, which analyzes 48 genes associated with an increased cancer risk, plus the add-on RNF43 gene for a total of 49 genes.  The genes on this panel include APC, DAMIAN, AXIN2, BAP1, BARD1, BMPR1A, BRCA1, BRCA2, BRIP1, CDH1, CDK4, CDKN2A, CHEK2, CTNNA1, DICER1, EPCAM, FH, GREM1, HOXB13, KIT, MBD4, MEN1, MLH1, MSH2, MSH3, MSH6, MUTYH, NF1, NTHL1, PALB2, PDGFRA, PMS2, POLD1, POLE, PTEN, RAD51C, RAD51D, RNF43, SDHA, SDHB, SDHC, SDHD, SMAD4, SMARCA4, STK11, TP53, TSC1, TSC2, and VHL  A blood sample for genetic evaluation was collected on 4/1/2024.   Genetic testing results are expected in 2 - 4 weeks from 4/1/2024.  We will contact the patient when results are received.  Mr. Russo asked  excellent questions during the consult and did not demonstrate any acute psychosocial distress during our visit.  This clinic encounter was 30 minutes.      Ishaan Andrade MS  Genetic Counselor  [facility]

## 2024-04-01 NOTE — PROGRESS NOTES
CHEMOTHERAPY PREPARATION    Jeb Russo  [id number]  1950    Subjective   Chief Complaint: Treatment Preparation and Needs Assessment    History of present illness:  Jeb Russo is a 73 y.o. year old male who is here today for chemotherapy preparation and needs assessment. The patient has been diagnosed with prostate cancer and is scheduled to begin treatment with Apalutamide, lupron and zometa.     Oncology History:    Oncology/Hematology History Overview Note   1.  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  Salvage external beam radiation 6400 cGy in 32 fractions ending 11/12/2021.  Stereotactic radiotherapy to 2 focal positive metastatic sites 1 year later.  PSA 8 September 2023.  Lupron started 45 mg every 6 months.  September 2023 PSA still 8.  September 23 PET showed 3 small newly hypermetabolic foci left mid abdomen and pelvis corresponding to normal-sized lymph nodes on CT suspicious for metastatic disease and to previously noted hypermetabolic nodes July 2022 no longer identified.  January 2024 PSA 9.2 3/15/2024 Pylarify PET shows increase in size and uptake of retroperitoneal adenopathy consistent with metastatic disease.  3/18/2024 PSA 11.5.  3/18/2024 follow-up with Dr. Grossman medical oncology.  He recommended Erleada or Xtandi but no chemotherapy.  Plan molecular testing with next generation sequencing and genetic counseling.    -3/25/2024 [facility] oncology follow-up: The patient had seen my partner Dr. Corcoran back in September and subsequently Dr. Otoole and in the meantime had seen Dr. Grossman at [facility].  I reviewed the above recommendations from Dr. Grossman and I confirmed with the patient that those are fine recommendations.  He also has the option of Zytiga prednisone which we discussed and I also discussed in addition the need for Zometa every 6 months to  prevent osteoporosis.  He is willing to try what ever I recommend and I would lean towards Erleada.  We discussed the side effects in detail and he will have a cancer treatment preparation visit with our pharmacists.  I would not for the moment go after this with CyberKnife unless he developed refractory symptomatic spots of disease.  I would not do next generation sequencing at this junction on his tissue from about 10 years ago but would procure tissue when he gets to the hormone refractory phase of his treatment and would send serum as well as tissue [employer] sequencing at that point.  We will make referrals to genetic testing which is standard for all patients with metastatic prostate cancer and may afford targets for therapy in the future as well.  He understands the above plan and is willing to proceed.  He will get his second Lupron shot today.He will see my nurse practitioner monthly with blood count chemistry and PSA and make sure he is tolerating this and in 3 months we will repeat the PSMA PET.  If we are making the progress that we need to see then perhaps we can back off on the frequency of the labs and the visits but I want to make sure he tolerates this well as we do have lateral hormonal blockade options.     Prostate cancer   2/4/2015 Cancer Staged    Staging form: Prostate, AJCC 8th Edition  - Pathologic stage from 2/4/2015: Stage BENNIE (pT2, pN1, cM0, PSA: 5, Grade Group: 2) - Signed by Niraj Otoole MD on 9/20/2021 9/20/2021 Initial Diagnosis    Prostate cancer (CMS/[facility])     9/30/2021 - 11/12/2021 Radiation    Radiation OncologyTreatment Course:  Jeb Russo received 6400 cGy in 32 fractions to prostate gland via External Beam Radiation - EBRT.     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         The current medication list and allergy list were reviewed and reconciled.     Past Medical History, Past Surgical History, Social History, Family History have been reviewed and are without significant changes except as mentioned.    Review of Systems   All other systems reviewed and are negative.    Objective   Physical Exam  Vital Signs: /82   Pulse 58   Temp 97.6 °F (36.4 °C)   Resp 18   Ht 171.5 cm (67.5\")   Wt 97.1 kg (214 lb)   SpO2 97%   BMI 33.02 kg/m²   Vitals:    04/01/24 1454   PainSc: 0-No pain           General Appearance:  alert, cooperative, no apparent distress, appears stated age, and normal weight   Neurologic/Psych: A&O x 3, gait steady, appropriate affect   HEENT:  Normocephalic, without obvious abnormality, mucous membranes moist   Lungs:   Respirations regular, even, and unlabored bilaterally       Extremities: Normal, atraumatic; no clubbing, cyanosis, or edema    Skin: No rashes, lesions, or abnormal coloration noted     ECOG Performance Status: 0 - Asymptomatic            NEEDS ASSESSMENTS    Genetics  The patient's new diagnosis and family history have been reviewed for genetic counseling needs. A genetic referral is recommended. He met with genetics today.    Molecular Testing  Further molecular testing on tumor is not indicated.     Psychosocial  The patient has completed a PHQ-9 Depression Screening and the Distress Thermometer (DT) today.   PHQ-9 Total Score:  . PHQ-9 results show 0 (No Depression). The patient scored their distress today as 0 on a scale of 0-10 with 0 being no distress and 10 being extreme " "distress.   Problems marked by the patient as being an issue for them within the last week include physical problems.   Results were reviewed along with psychosocial resources offered by our cancer center. Our oncology social worker will be flagged for a DT score of 4 or above, and a same day call will be made for a score of 9 or 10. A mental health referral is not recommended at this time. The patient is not accepting of a referral to [facility].   Copies of patient's questionnaires will be scanned into EMR for details and further reference.    Barriers to care  A barriers form was also completed by the patient today. We discussed services offered by our facility to help him have adequate access to care. The patient was given the name and card for our Oncology Social Worker. Based upon barriers assessment today, the patient will not require a follow-up call from the  to further discuss needs.   A copy of the barriers form will also be scanned into EMR for details and further reference.     VAD Assessment  The patient and I discussed planned intervenous chemotherapy as well as other IV treatments that are often needed throughout the course of treatment. These may include, but are not limited to blood transfusions, antibiotics, and IV hydration. The vasculature does appear to be adequate for multiple peripheral IVs throughout their treatment course. Discussed risks and benefits of VADs. The patient would not like to pursue Port-A-Cath insertion prior to initiation of treatment. No need for central venous access at this time.    Advance Care Planning   ACP discussion was declined by the patient. Patient does not have an advance directive, information provided.  The patient and I discussed advanced care planning, \"Conversations that Matter\".   This service was offered, free of charge, for development of advance directives with a certified ACP facilitator.  The patient does not have an up-to-date " advanced directive. This document is not on file with our office. The patient is not interested in an appointment with one of our facilitators to create or update their advanced directives.         Palliative Care  The patient and I discussed palliative care services. Palliative care is not the same as Hospice care. This is specialized medical care for people living with serious illness with the goal of improving quality of life for the patient and their family. [facility] [place] operated by [facility] offers our patients outpatient palliative care early along with their treatment to assist in coordination of care, symptom management, pain management, and medical decision making.  Oncology criteria for palliative care referral is not met at this time. The patient is not interested in a palliative care consultation.     Additional Referral needs  none      CHEMOTHERAPY EDUCATION    Booklets Given: Chemotherapy and You []  Eating Hints [x]    Sexuality/Fertility Books []      Chemotherapy/Biotherapy Education Sheets: (list all that apply)  Cancer resourse contacts information                                                                                                                                                                 Chemotherapy Regimen:   Treatment Plans       Name Type Plan Dates Plan Provider         Active    OP SUPPORTIVE Leuprolide 45 mg Q6M ONCOLOGY SUPPORTIVE CARE 1  9/25/2023 - Present Marielos Acosta MD     OP SUPPORTIVE Zoledronic Acid Q6M ONCOLOGY SUPPORTIVE CARE 2  3/25/2024 - Present Bright Gan MD     OP PROSTATE Apalutamide ONCOLOGY TREATMENT  3/26/2024 - Present Bright Gan MD                      DETAILED CHEMOTHERAPY TEACHING COMPLETED BY PHARMACY. CHEMOTHERAPY CONSENT COMPLETED BY PHARMACY. SEE PHARMACY EDUCATION FOR DOCUMENTATION.     Chemotherapy education comprehension reviewed. Questions answered       Assessment and Plan:    Diagnoses and all orders for this visit:    1. Prostate cancer (Primary)  -      CBC and Differential; Future  -     Comprehensive metabolic panel; Future  -     PSA Diagnostic; Future  -     Lab Appointment Request; Future  -     Clinic Appointment Request; Future      Discussion:    The patient and I have reviewed their new cancer diagnosis and scheduled treatment plan. Needs assessment was completed including genetics, psychosocial needs, barriers to care, VAD evaluation, advanced care planning, and palliative care services. Referrals have been ordered as appropriate based upon our evaluation and patient desires.     Chemotherapy teaching was also completed today as documented above. Adequate time was given to answer all questions to his satisfaction. Patient and family are aware of their care team members and contact information if they have questions or problems throughout the treatment course. Needs assessments and education has been completed. The patient is adequately prepared to begin treatment as scheduled.     He has already received his first Lupron injection and Zometa, he will be getting both of these every 6 months with next dose scheduled for September.  I will plan on seeing him back in 1 month for follow-up to see how he is tolerating his apalutamide.    I spent 30 minutes caring for Jeb on this date of service. This time includes time spent by me in the following activities: preparing for the visit, reviewing tests, obtaining and/or reviewing a separately obtained history, performing a medically appropriate examination and/or evaluation, counseling and educating the patient/family/caregiver, documenting information in the medical record, independently interpreting results and communicating that information with the patient/family/caregiver, and care coordination.     Electronically signed by EMANI Doll on 04/01/24 at 15:47 EDT.

## 2024-04-05 NOTE — PROGRESS NOTES
Confirmed patient received Erleada prescription at home address on file from [facility] mail order pharmacy on Wednesday, 4/3/2024.        Kyleigh Wei [facility]  Pharmacy Care Coordinator  Hematology and Oncology  [phone]  4/5/2024  14:55 EDT

## 2024-04-08 ENCOUNTER — TELEPHONE (OUTPATIENT)
Dept: ONCOLOGY | Facility: CLINIC | Age: 74
End: 2024-04-08
Payer: MEDICARE

## 2024-04-16 ENCOUNTER — TELEPHONE (OUTPATIENT)
Dept: GENETICS | Facility: HOSPITAL | Age: 74
End: 2024-04-16
Payer: MEDICARE

## 2024-04-16 NOTE — TELEPHONE ENCOUNTER
1st attempt to disclose genetic test results.  Talked to his sister, whom was present at the pretest consult, and instructed her to have Mr. Russo to return my call tomorrow on 4/17/24

## 2024-04-17 ENCOUNTER — DOCUMENTATION (OUTPATIENT)
Dept: GENETICS | Facility: HOSPITAL | Age: 74
End: 2024-04-17
Payer: MEDICARE

## 2024-04-17 NOTE — PROGRESS NOTES
Date of Visit: 2024  Name: Jeb Russo  : 1950  MRN: [id number]    Referring Provider: Bright Gan MD        REASON FOR CONSULT  Jeb Russo is a 73 y.o. male referred for genetic counseling due to a biochemical recurrence of his previous metastatic prostate cancer.  Mr. Russo was interested in discussing his risk for a hereditary cancer syndrome and genetic testing for cancer susceptibility.  In  at the age of 64 Mr. Russo was diagnosed with a metastatic prostatic adenocarcinoma [place] 3+4=7 with a  PSA level of 8.5.  A radical prostatectomy was performed.  In 2023 PSA level monitoring began to indicate a rise in his PSA levels that have reached a level of 8.1 indicating oligometastatic prostate cancer.  He has not tolerated androgen ablation well and is currently undergoing radiation therapy.  He also has a personal history of an unknown number of serrated colon polyps.  Mr. Russo elected to pursue genetic testing via [facility] Common Hereditary Cancers panel with the add-on RNF43 gene associated with serrated polyposis syndrome with RNA analyzing 49-cancer risk genes.   Mr. Russo's genetic test was negative for pathogenic mutations in all 49 - cancer risk genes analyzed.  Mr. 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  Per the American College of Medical Genetics (ACMG) guidelines, this VUS is not to be used in clinical decision-making, and is to be considered a negative result until further evidence supports reclassification of this variant.  Mr. Russo was notified of these results via telephone on 2024.     PERTINENT FAMILY HISTORY:  A cancer-focused four-generation pedigree was obtained via patient reporting     Maternal Uncle - prostate cancer, 96  Maternal 1st Cousin (son of CHIOMA) - prostate cancer, 60's  Maternal 2nd Cousin - breast cancer, early 50's    GENETIC TESTING RESULTS AND  RECOMMENDATIONS  Genetic testing was performed by [facility] analyzing 49-cancer-risk genes.     No pathogenic mutations were detected by sequencing, rearrangement testing, and RNA analysis for the 49 - genes analyzed.  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  With multigene panel testing, it is not uncommon for a variant of uncertain significance (VUS) to be identified.  Variants are termed VUS when there is not sufficient evidence to classify the variant as a negative (not harmful) variant or a positive (harmful) mutation. Genetic testing labs work to reclassify all VUSs overtime and issue an updated report when a reclassification occurs.  The majority (over 90%) of VUSs that are reclassified are found to be negative genetic variants, however, a small percentage (~9%) will reclassified as a harmful mutation. Clinically, a VUS is treated as a negative result.  Medical care should not be altered solely based on a VUS result.  Medical care should be guided by a family history-based risk assessment.  Genetic testing for other unaffected (never had cancer) relatives is not recommended solely for a VUS.  Currently, in the ClinVar database, which is an aggregate human genomic variation database managed by the National Library of [employer], this particular variant has reports submitted by 3 different labs all of which designate this variant a VUS as well.  Most cancer cases (~70%) are classified as sporadic in nature.  This result does not clarify the cause of Mr. Russo's cancer diagnosis or his family history of cancer.  Increased cancer risks for Mr. Russo and his family may remain due to his diagnosis and the family history of cancer.    This result does not rule out a hereditary cause for Mr. Russo's personal or family history of cancer.  There could be a mutation in the family that explains the pattern of cancer that Mr. Russo did not inherit.  There could be a mutation in the family that  explains the pattern of cancer that we cannot identify at this time or in a gene that was not tested.  There could be a mutation in one of the genes tested that was not detected. Current genetic testing technologies will identify the majority of mutations with high accuracy, but some remain undetectable at this time. Mr. Russo's family history of cancer could be occurring not due to a single gene mutation, but due to multiple undetectable genetic and environmental factors that we currently do not understand well.  There is a chance this VUS could be upgraded in the future to a pathogenic mutation.  We encourage patients to reach out over time to inquire about any new methodologies of testing or newly identified cancer risk genes that could explain their personal or family history of cancer.    GENETIC COUNSELING  We reviewed the family history and his personal cancer history information in detail. Cases of cancer follow three general patterns: sporadic, familial, and hereditary.  While most cancer cases are sporadic (~70% of cancer cases), some cases appear to occur in family clusters.  These cases are said to be familial and account for around 20% of cancer cases.  Familial cases may be due to a combination of shared genes and environmental factors among family members that we cannot evaluate via genetic testing at this time.  In 5% - 10% of cancer cases, the risk for cancer is inherited, and the genes responsible for the increased cancer risk are known.       Family histories typical of hereditary cancer syndromes usually include multiple first- and second-degree relatives diagnosed with cancer types that define a syndrome.  These cases tend to be diagnosed at younger-than-expected ages and can be bilateral or multifocal.  The cancer in these families follows an autosomal dominant inheritance pattern, which indicates the likely presence of a mutation in a cancer gene.  Children and siblings of an individual  "carrying a mutation have a 50% chance of inheriting that mutation, thereby inheriting the increased risk to develop cancer.  However, it is not recommended to pursue genetic testing for adult-onset cancers in children as the results would not have clinical utility until some time in adulthood among other ethical reasons.  These mutations can be passed down from the maternal or the paternal lineage.     We discussed that risk factors or \"red flags\" for hereditary risk include cancers diagnosed at earlier-than-average ages, multiple family members diagnosed with cancers associated with mutations in the same gene, or multiple generations of associated cancers. Dependent on the specific cancer type or syndrome, there exists the potential for several clinically significant genes related to the cancer's onset or increased risk for development.  Therefore, the standard approach to hereditary cancer genetic testing at this time is via multi-gene panel analyzing several genes associated with a hereditary risk for cancer.  We reviewed the results in the context of the patient's personal and family history to determine what, if any, post-test cancer risk management changes are recommended.       GENETIC TESTING  The risks, benefits, and limitations of genetic testing and implications for clinical management following testing were reviewed.  We discussed the implications and limitations of a positive, negative, or VUS test result.  Genetic test results can influence decisions regarding screening and prevention.  Genetic testing can have significant psychological implications for both individuals and families. Also discussed was the possibility of insurance discrimination based on genetic test results and the laws in place to prevent this, as well as the limitations of these laws.       The Genetic Information Nondiscrimination Act (FRAN) is a federal law enacted in May 2008.  FRAN prohibits discrimination on the basis of " genetic information such as genetic test results with respect to health insurance and employment status.  Under Title 1 of FRAN, health insurance companies are prohibited from using an individual's genetic information to change premiums, drop or change an individual's coverage, or prevent coverage from being acquired.  Title 2 of FRAN prohibits employers from using genetic information in employment decisions such as, but not limited to, hiring, firing, promotions, pay, and job assignments.  FRAN protections do not protect against genetic discrimination by life insurance, long-term care or disability insurance,  service members, federal employees, those using the [facility], or those employed by a small business with fewer than 15 employees.     FAMILY CONSIDERATIONS  Genetic testing for Mr. Russo's relatives may be informative despite Mr. Russo's test result being regarded as a negative result if additional cancers in the family are discovered or develop.  Testing other unaffected family members may result in negative results or other VUS's that would not clarify Mr. Paulsons personal or family history of cancer.  It would be uninformative to test Mr. Russo's children as he cannot pass on mutations that he does not have.  Genetic testing for family members solely based on the discovery of a VUS is not recommended.  However, if this VUS is upgraded to a pathogenic mutation in the future then these recommendations could change.  Family members are encouraged to discuss their family history of cancer and appropriate cancer screening recommendations with their healthcare providers.  Family members are also encouraged to inquire about information regarding genetics and genetic testing, if appropriate, at a clinic that offers genetic counseling or their healthcare provider.  We would be happy to see relatives in our clinic for genetic counseling and testing.  They can request a referral from  their healthcare provider to [facility] and that will prompt a coordinator to call them for an appointment.   For family members who live elsewhere, there are genetic counselors at most [facility] centers.  They can find a genetic counselor by visiting the National Society of Genetic Counselors website at www.nsgc.org or they can call our office and we would be happy to give them the contact information of the closest genetic counselor.     SUMMARY  Mr. Russo's hereditary cancer genetic test identified no pathogenic mutations explaining why his prostate cancer may have developed or his family history of cancer, nor increasing his risk for any other cancers.  Mr. 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  It is recommended that Mr. Russo follow his provider's recommendations for future cancer screening and future risk reduction.  A copy of the genetic test report is attached to this note.  Mr. Russo asked excellent questions during the consult and did not demonstrate any acute psychosocial distress during our visit. This clinic encounter was 15 minutes in duration.      Ishaan Andrade MS  Genetic Counselor  [facility]    Cc:  Bright Olivares MD Beckman, Alan C., MD Eckert, Sara K, APRN

## 2024-05-09 ENCOUNTER — LAB (OUTPATIENT)
Dept: LAB | Facility: HOSPITAL | Age: 74
End: 2024-05-09
Payer: MEDICARE

## 2024-05-09 DIAGNOSIS — C61 PROSTATE CANCER: ICD-10-CM

## 2024-05-09 LAB
ALBUMIN SERPL-MCNC: 4 G/DL (ref 3.5–5.2)
ALBUMIN/GLOB SERPL: 1.4 G/DL
ALP SERPL-CCNC: 61 U/L (ref 39–117)
ALT SERPL W P-5'-P-CCNC: 28 U/L (ref 1–41)
ANION GAP SERPL CALCULATED.3IONS-SCNC: 6 MMOL/L (ref 5–15)
AST SERPL-CCNC: 35 U/L (ref 1–40)
BASOPHILS # BLD AUTO: 0.04 10*3/MM3 (ref 0–0.2)
BASOPHILS NFR BLD AUTO: 0.7 % (ref 0–1.5)
BILIRUB SERPL-MCNC: 0.3 MG/DL (ref 0–1.2)
BUN SERPL-MCNC: 16 MG/DL (ref 8–23)
BUN/CREAT SERPL: 15.5 (ref 7–25)
CALCIUM SPEC-SCNC: 10 MG/DL (ref 8.6–10.5)
CHLORIDE SERPL-SCNC: 103 MMOL/L (ref 98–107)
CO2 SERPL-SCNC: 27 MMOL/L (ref 22–29)
CREAT SERPL-MCNC: 1.03 MG/DL (ref 0.76–1.27)
DEPRECATED RDW RBC AUTO: 43 FL (ref 37–54)
EGFRCR SERPLBLD CKD-EPI 2021: 76.2 ML/MIN/1.73
EOSINOPHIL # BLD AUTO: 0.18 10*3/MM3 (ref 0–0.4)
EOSINOPHIL NFR BLD AUTO: 3.1 % (ref 0.3–6.2)
ERYTHROCYTE [DISTWIDTH] IN BLOOD BY AUTOMATED COUNT: 13.7 % (ref 12.3–15.4)
GLOBULIN UR ELPH-MCNC: 2.8 GM/DL
GLUCOSE SERPL-MCNC: 119 MG/DL (ref 65–99)
HCT VFR BLD AUTO: 45.6 % (ref 37.5–51)
HGB BLD-MCNC: 15 G/DL (ref 13–17.7)
IMM GRANULOCYTES # BLD AUTO: 0.01 10*3/MM3 (ref 0–0.05)
IMM GRANULOCYTES NFR BLD AUTO: 0.2 % (ref 0–0.5)
LYMPHOCYTES # BLD AUTO: 1.13 10*3/MM3 (ref 0.7–3.1)
LYMPHOCYTES NFR BLD AUTO: 19.4 % (ref 19.6–45.3)
MCH RBC QN AUTO: 28.4 PG (ref 26.6–33)
MCHC RBC AUTO-ENTMCNC: 32.9 G/DL (ref 31.5–35.7)
MCV RBC AUTO: 86.2 FL (ref 79–97)
MONOCYTES # BLD AUTO: 0.46 10*3/MM3 (ref 0.1–0.9)
MONOCYTES NFR BLD AUTO: 7.9 % (ref 5–12)
NEUTROPHILS NFR BLD AUTO: 4 10*3/MM3 (ref 1.7–7)
NEUTROPHILS NFR BLD AUTO: 68.7 % (ref 42.7–76)
PLATELET # BLD AUTO: 200 10*3/MM3 (ref 140–450)
PMV BLD AUTO: 9.6 FL (ref 6–12)
POTASSIUM SERPL-SCNC: 5 MMOL/L (ref 3.5–5.2)
PROT SERPL-MCNC: 6.8 G/DL (ref 6–8.5)
PSA SERPL-MCNC: 0.19 NG/ML (ref 0–4)
RBC # BLD AUTO: 5.29 10*6/MM3 (ref 4.14–5.8)
SODIUM SERPL-SCNC: 136 MMOL/L (ref 136–145)
WBC NRBC COR # BLD AUTO: 5.82 10*3/MM3 (ref 3.4–10.8)

## 2024-05-09 PROCEDURE — 85025 COMPLETE CBC W/AUTO DIFF WBC: CPT

## 2024-05-09 PROCEDURE — 80053 COMPREHEN METABOLIC PANEL: CPT

## 2024-05-09 PROCEDURE — 84153 ASSAY OF PSA TOTAL: CPT

## 2024-05-09 PROCEDURE — 36415 COLL VENOUS BLD VENIPUNCTURE: CPT

## 2024-05-10 ENCOUNTER — OFFICE VISIT (OUTPATIENT)
Dept: ONCOLOGY | Facility: CLINIC | Age: 74
End: 2024-05-10
Payer: MEDICARE

## 2024-05-10 VITALS
TEMPERATURE: 97.5 F | HEART RATE: 60 BPM | BODY MASS INDEX: 32.43 KG/M2 | HEIGHT: 68 IN | RESPIRATION RATE: 18 BRPM | SYSTOLIC BLOOD PRESSURE: 152 MMHG | OXYGEN SATURATION: 98 % | DIASTOLIC BLOOD PRESSURE: 86 MMHG | WEIGHT: 214 LBS

## 2024-05-10 DIAGNOSIS — C61 PROSTATE CANCER: Primary | ICD-10-CM

## 2024-05-10 DIAGNOSIS — Z79.818 ANDROGEN DEPRIVATION THERAPY: ICD-10-CM

## 2024-05-10 PROCEDURE — 99213 OFFICE O/P EST LOW 20 MIN: CPT | Performed by: NURSE PRACTITIONER

## 2024-05-10 PROCEDURE — 1159F MED LIST DOCD IN RCRD: CPT | Performed by: NURSE PRACTITIONER

## 2024-05-10 PROCEDURE — 1126F AMNT PAIN NOTED NONE PRSNT: CPT | Performed by: NURSE PRACTITIONER

## 2024-05-10 PROCEDURE — 1160F RVW MEDS BY RX/DR IN RCRD: CPT | Performed by: NURSE PRACTITIONER

## 2024-05-13 ENCOUNTER — SPECIALTY PHARMACY (OUTPATIENT)
Dept: ONCOLOGY | Facility: HOSPITAL | Age: 74
End: 2024-05-13
Payer: MEDICARE

## 2024-05-14 ENCOUNTER — TELEPHONE (OUTPATIENT)
Dept: ONCOLOGY | Facility: CLINIC | Age: 74
End: 2024-05-14
Payer: MEDICARE

## 2024-05-14 NOTE — TELEPHONE ENCOUNTER
Caller: Johnathon Russo    Relationship: Self    Best call back number: [phone]    What was the call regarding: JOHNATHON CALLED TO SPEAK WITH DR. DAVE. HE WANTS TO DISCUSS THE POSSIBILITY OF TRAVELING AND HIS OPTIONS FOR THAT.

## 2024-05-14 NOTE — TELEPHONE ENCOUNTER
I returned the patient's call, I explained to him that I had spoken with Dr. Gan and we would not be able to manage his cancer at arms length if he were to living in [facility] and not able to return to see us at least every 3 months.  He would like to speak with Dr. Gan in person, I will get him on the schedule later this week.

## 2024-05-14 NOTE — TELEPHONE ENCOUNTER
Called patient to discuss, he states he would like to travel now and would like to see Dr. Gan sooner than currently scheduled.

## 2024-05-17 ENCOUNTER — OFFICE VISIT (OUTPATIENT)
Dept: ONCOLOGY | Facility: CLINIC | Age: 74
End: 2024-05-17
Payer: MEDICARE

## 2024-05-17 VITALS
WEIGHT: 212 LBS | DIASTOLIC BLOOD PRESSURE: 71 MMHG | TEMPERATURE: 97.5 F | OXYGEN SATURATION: 97 % | SYSTOLIC BLOOD PRESSURE: 140 MMHG | HEART RATE: 71 BPM | BODY MASS INDEX: 32.13 KG/M2 | HEIGHT: 68 IN | RESPIRATION RATE: 18 BRPM

## 2024-05-17 DIAGNOSIS — C77.2 SECONDARY AND UNSPECIFIED MALIGNANT NEOPLASM OF INTRA-ABDOMINAL LYMPH NODES: ICD-10-CM

## 2024-05-17 DIAGNOSIS — C61 PROSTATE CANCER: Primary | ICD-10-CM

## 2024-05-17 PROCEDURE — 1159F MED LIST DOCD IN RCRD: CPT | Performed by: INTERNAL MEDICINE

## 2024-05-17 PROCEDURE — 1160F RVW MEDS BY RX/DR IN RCRD: CPT | Performed by: INTERNAL MEDICINE

## 2024-05-17 PROCEDURE — 1126F AMNT PAIN NOTED NONE PRSNT: CPT | Performed by: INTERNAL MEDICINE

## 2024-05-17 PROCEDURE — 99214 OFFICE O/P EST MOD 30 MIN: CPT | Performed by: INTERNAL MEDICINE

## 2024-05-17 NOTE — PROGRESS NOTES
CHIEF COMPLAINT: No somatic complaints    Problem List:  Oncology/Hematology History Overview Note   1.  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  Salvage external beam radiation 6400 cGy in 32 fractions ending 11/12/2021.  Stereotactic radiotherapy to 2 focal positive metastatic sites 1 year later.  PSA 8 September 2023.  Lupron started 45 mg every 6 months.  September 2023 PSA still 8.  September 23 PET showed 3 small newly hypermetabolic foci left mid abdomen and pelvis corresponding to normal-sized lymph nodes on CT suspicious for metastatic disease and to previously noted hypermetabolic nodes July 2022 no longer identified.  January 2024 PSA 9.2 3/15/2024 Pylarify PET shows increase in size and uptake of retroperitoneal adenopathy consistent with metastatic disease.  3/18/2024 PSA 11.5.  3/18/2024 follow-up with Dr. Grossman medical oncology.  He recommended Erleada or Xtandi but no chemotherapy.  Plan molecular testing with next generation sequencing and genetic counseling.    -3/25/2024 [facility] oncology follow-up: The patient had seen my partner Dr. Corcoran back in September and subsequently Dr. Otoole and in the meantime had seen Dr. Grossman at [facility].  I reviewed the above recommendations from Dr. Grossman and I confirmed with the patient that those are fine recommendations.  He also has the option of Zytiga prednisone which we discussed and I also discussed in addition the need for Zometa every 6 months to prevent osteoporosis.  He is willing to try what ever I recommend and I would lean towards Erleada.  We discussed the side effects in detail and he will have a cancer treatment preparation visit with our pharmacists.  I would not for the moment go after this with CyberKnife unless he developed refractory symptomatic spots of disease.  I would not do next generation sequencing at this  junction on his tissue from about 10 years ago but would procure tissue when he gets to the hormone refractory phase of his treatment and would send serum as well as tissue NexGen sequencing at that point.  We will make referrals to genetic testing which is standard for all patients with metastatic prostate cancer and may afford targets for therapy in the future as well.  He understands the above plan and is willing to proceed.  He will get his second Lupron shot today.He will see my nurse practitioner monthly with blood count chemistry and PSA and make sure he is tolerating this and in 3 months we will repeat the PSMA PET.  If we are making the progress that we need to see then perhaps we can back off on the frequency of the labs and the visits but I want to make sure he tolerates this well as we do have lateral hormonal blockade options.    -3/25/2024 received Lupron and Zometa  -4/1/2024 chemotherapy education preparation and needs assessment completed    -4/5/2024 began apalutamide    -4/17/2024  Mr. Russo's genetic test was negative for pathogenic mutations in all 49 - cancer risk genes analyzed.  Mr. 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  Per the [employer] (ACMG) guidelines, this VUS is not to be used in clinical decision-making, and is to be considered a negative result until further evidence supports reclassification of this variant.     -5/9/2024 PSA 0.188  -5/10/2024 [facility] Oncology clinic follow-up: He is doing well on current therapy with apalutamide, Lupron and Zometa.  Clinically seems to be having an excellent response thus far with a drop in his PSA now down to 0.188.  He is having no significant side effects from his treatment.  He did have some bone pain and fatigue for a few days after Zometa but that resolved.  Physically he is feeling quite well.  He will continue treatment unchanged.  We will see him  back in 4 weeks for follow-up with repeat labs on return.  He is anxious to make a trip back home however I will have him wait and discussed with Dr. Gan plans for ongoing therapy and scans to assess for response prior to any travels.     Prostate cancer   2/4/2015 Cancer Staged    Staging form: Prostate, AJCC 8th Edition  - Pathologic stage from 2/4/2015: Stage BENNIE (pT2, pN1, cM0, PSA: 5, Grade Group: 2) - Signed by Niraj Otoole MD on 9/20/2021 9/20/2021 Initial Diagnosis    Prostate cancer (CMS/HCC)     9/30/2021 - 11/12/2021 Radiation    Radiation OncologyTreatment Course:  Jeb Russo received 6400 cGy in 32 fractions to prostate gland via External Beam Radiation - EBRT.     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         HISTORY OF PRESENT ILLNESS:  The patient is a 73 y.o. male, here for follow up on management of PSA responsive hormone sensitive prostate cancer metastasis    Past Medical History:   Diagnosis Date    Cancer     prostate    Coronary artery disease     Encounter for pacemaker at end of battery life 04/02/2021    Added automatically from request for surgery [id number]    Prostate cancer      Past Surgical History:   Procedure Laterality Date    ANKLE SURGERY      CARDIAC ELECTROPHYSIOLOGY PROCEDURE N/A 04/05/2021    Procedure: PPM generator change - dual  [employer];  Surgeon: Valentina Saini MD;  Location: [facility] INVASIVE  "LOCATION;  Service: Cardiology;  Laterality: N/A;    CARDIAC ELECTROPHYSIOLOGY PROCEDURE N/A 11/01/2021    Procedure: Pocket Revision to submuscular, no meds to hold;  Surgeon: Anant Jaramillo MD;  Location: [facility] INVASIVE LOCATION;  Service: Cardiology;  Laterality: N/A;    COLONOSCOPY      CYBERKNIFE  08/12/2022    Metastatic left iliac LN, left periaortic LN    INSERT / REPLACE / REMOVE PACEMAKER  2009    KNEE ACL RECONSTRUCTION      KNEE ACL RECONSTRUCTION      PROSTATE SURGERY  2015       No Known Allergies    Family History and Social History reviewed and changed as necessary    REVIEW OF SYSTEM:   No new somatic complaint    PHYSICAL EXAM:  No jaundice icterus pallor or respiratory distress.  No rashes.    Vitals:    05/17/24 0945   BP: 140/71   Pulse: 71   Resp: 18   Temp: 97.5 °F (36.4 °C)   SpO2: 97%   Weight: 96.2 kg (212 lb)   Height: 171.5 cm (67.5\")     Vitals:    05/17/24 0945   PainSc: 0-No pain          ECOG score: 1           Vitals reviewed.    ECOG: (1) Restricted in Physically Strenuous Activity, Ambulatory & Able to Do Work of Light Nature    Lab Results   Component Value Date    HGB 15.0 05/09/2024    HCT 45.6 05/09/2024    MCV 86.2 05/09/2024     05/09/2024    WBC 5.82 05/09/2024    NEUTROABS 4.00 05/09/2024    LYMPHSABS 1.13 05/09/2024    MONOSABS 0.46 05/09/2024    EOSABS 0.18 05/09/2024    BASOSABS 0.04 05/09/2024       Lab Results   Component Value Date    GLUCOSE 119 (H) 05/09/2024    BUN 16 05/09/2024    CREATININE 1.03 05/09/2024     05/09/2024    K 5.0 05/09/2024     05/09/2024    CO2 27.0 05/09/2024    CALCIUM 10.0 05/09/2024    PROTEINTOT 6.8 05/09/2024    ALBUMIN 4.0 05/09/2024    BILITOT 0.3 05/09/2024    ALKPHOS 61 05/09/2024    AST 35 05/09/2024    ALT 28 05/09/2024             ASSESSMENT & PLAN:  1.  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  Salvage external beam radiation 6400 cGy in 32 fractions ending 11/12/2021.  Stereotactic radiotherapy to 2 focal positive metastatic sites 1 year later.  PSA 8 September 2023.  Lupron started 45 mg every 6 months.  September 2023 PSA still 8.  September 23 PET showed 3 small newly hypermetabolic foci left mid abdomen and pelvis corresponding to normal-sized lymph nodes on CT suspicious for metastatic disease and to previously noted hypermetabolic nodes July 2022 no longer identified.  January 2024 PSA 9.2 3/15/2024 Pylarify PET shows increase in size and uptake of retroperitoneal adenopathy consistent with metastatic disease.  3/18/2024 PSA 11.5.  3/18/2024 follow-up with Dr. Grossman medical oncology.  He recommended Erleada or Xtandi but no chemotherapy.  Plan molecular testing with next generation sequencing and genetic counseling.    -3/25/2024 [facility] oncology follow-up: The patient had seen my partner Dr. Corcoran back in September and subsequently Dr. Otoole and in the meantime had seen Dr. Grossman at [facility].  I reviewed the above recommendations from Dr. Grossman and I confirmed with the patient that those are fine recommendations.  He also has the option of Zytiga prednisone which we discussed and I also discussed in addition the need for Zometa every 6 months to prevent osteoporosis.  He is willing to try what ever I recommend and I would lean towards Erleada.  We discussed the side effects in detail and he will have a cancer treatment preparation visit with our pharmacists.  I would not for the moment go after this with CyberKnife unless he developed refractory symptomatic spots of disease.  I would not do next generation sequencing at this junction on his tissue from about 10 years ago but would procure tissue when he gets to the hormone refractory phase of his treatment and would send serum as well as tissue NexGen sequencing at that point.  We will make referrals to genetic testing  which is standard for all patients with metastatic prostate cancer and may afford targets for therapy in the future as well.  He understands the above plan and is willing to proceed.  He will get his second Lupron shot today.He will see my nurse practitioner monthly with blood count chemistry and PSA and make sure he is tolerating this and in 3 months we will repeat the PSMA PET.  If we are making the progress that we need to see then perhaps we can back off on the frequency of the labs and the visits but I want to make sure he tolerates this well as we do have lateral hormonal blockade options.    -3/25/2024 received Lupron and Zometa  -4/1/2024 chemotherapy education preparation and needs assessment completed    -4/5/2024 began apalutamide    -4/17/2024  Mr. Russo's genetic test was negative for pathogenic mutations in all 49 - cancer risk genes analyzed.  Mr. 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  Per the American College [employer] Medical Genetics (ACMG) guidelines, this VUS is not to be used in clinical decision-making, and is to be considered a negative result until further evidence supports reclassification of this variant.     -5/9/2024 PSA 0.188  -5/10/2024 [facility] clinic follow-up: He is doing well on current therapy with apalutamide, Lupron and Zometa.  Clinically seems to be having an excellent response thus far with a drop in his PSA now down to 0.188.  He is having no significant side effects from his treatment.  He did have some bone pain and fatigue for a few days after Zometa but that resolved.  Physically he is feeling quite well.  He will continue treatment unchanged.  We will see him back in 4 weeks for follow-up with repeat labs on return.  He is anxious to make a trip back home however I will have him wait and discussed with Dr. Gan plans for ongoing therapy and scans to assess for response prior to any travels.    -5/17/2024  [facility] follow-up: He returns today for logistical discussions.  See my March note for overall plan but the following I think is a reasonable plan.  He will see my note nurse practitioner July 5 for labs including PSA which is going down nicely and at that point she will set him up for a PSMA PET the middle of July.  If that looks good and the PSA is coming down nicely then I think before his Lupron and Zometa the end of September would be fine for him to go home to [facility] with a CBC CMP and PSA towards the end of August in [facility] which he will get the information to us.  He is pleased with how he is feeling on the apalutamide Lupron and Zometa.  He understands the plan for indefinite salvage hormone blockade for hormone sensitive prostate cancer with a HOXB13 VUS.    Total time of care today inclusive of time spent today reviewing interval data since last I have seen him as well as discussing this complex decision tree as outlined above took 30 minutes of patient care time throughout the day today.  Bright Gan MD    05/17/2024

## 2024-05-17 NOTE — LETTER
May 17, 2024       No Recipients    Patient: Jeb Russo   YOB: 1950   Date of Visit: 5/17/2024     Dear Jacqueline Torres PA-C:       Thank you for referring Jeb Russo to me for evaluation. Below are the relevant portions of my assessment and plan of care.    If you have questions, please do not hesitate to call me. I look forward to following Jeb along with you.         Sincerely,        Bright Gan MD        CC:   No Recipients    Bright Gan MD  05/17/24 1109  Sign when Signing Visit  CHIEF COMPLAINT: No somatic complaints    Problem List:  Oncology/Hematology History Overview Note   1.  Stage mAy pathologic T2 N1 M0 PSA 5 grade group 2 prostate cancer staging 9/20/2021 with prostatectomy 8-1/2 years prior to this for node positive disease.  Salvage external beam radiation 6400 cGy in 32 fractions ending 11/12/2021.  Stereotactic radiotherapy to 2 focal positive metastatic sites 1 year later.  PSA 8 September 2023.  Lupron started 45 mg every 6 months.  September 2023 PSA still 8.  September 23 PET showed 3 small newly hypermetabolic foci left mid abdomen and pelvis corresponding to normal-sized lymph nodes on CT suspicious for metastatic disease and to previously noted hypermetabolic nodes July 2022 no longer identified.  January 2024 PSA 9.2 3/15/2024 Pylarify PET shows increase in size and uptake of retroperitoneal adenopathy consistent with metastatic disease.  3/18/2024 PSA 11.5.  3/18/2024 follow-up with Dr. Grossman medical oncology.  He recommended Erleada or Xtandi but no chemotherapy.  Plan molecular testing with next generation sequencing and genetic counseling.    -3/25/2024 [facility] medical oncology follow-up: The patient had seen my partner Dr. Corcoran back in September and subsequently Dr. Otoole and in the meantime had seen Dr. Grossman at [facility].  I reviewed the above recommendations from Dr. Grossman and I confirmed with the patient that those are fine  recommendations.  He also has the option of Zytiga prednisone which we discussed and I also discussed in addition the need for Zometa every 6 months to prevent osteoporosis.  He is willing to try what ever I recommend and I would lean towards Erleada.  We discussed the side effects in detail and he will have a cancer treatment preparation visit with our pharmacists.  I would not for the moment go after this with CyberKnife unless he developed refractory symptomatic spots of disease.  I would not do next generation sequencing at this junction on his tissue from about 10 years ago but would procure tissue when he gets to the hormone refractory phase of his treatment and would send serum as well as tissue NexGen sequencing at that point.  We will make referrals to genetic testing which is standard for all patients with metastatic prostate cancer and may afford targets for therapy in the future as well.  He understands the above plan and is willing to proceed.  He will get his second Lupron shot today.He will see my nurse practitioner monthly with blood count chemistry and PSA and make sure he is tolerating this and in 3 months we will repeat the PSMA PET.  If we are making the progress that we need to see then perhaps we can back off on the frequency of the labs and the visits but I want to make sure he tolerates this well as we do have lateral hormonal blockade options.    -3/25/2024 received Lupron and Zometa  -4/1/2024 chemotherapy education preparation and needs assessment completed    -4/5/2024 began apalutamide    -4/17/2024  Mr. Russo's genetic test was negative for pathogenic mutations in all 49 - cancer risk genes analyzed.  Mr. 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  Per the [employer] College [employer]etics (ACMG) guidelines, this VUS is not to be used in clinical decision-making, and is to be considered a negative result until further  evidence supports reclassification of this variant.     -5/9/2024 PSA 0.188  -5/10/2024 [facility] Oncology clinic follow-up: He is doing well on current therapy with apalutamide, Lupron and Zometa.  Clinically seems to be having an excellent response thus far with a drop in his PSA now down to 0.188.  He is having no significant side effects from his treatment.  He did have some bone pain and fatigue for a few days after Zometa but that resolved.  Physically he is feeling quite well.  He will continue treatment unchanged.  We will see him back in 4 weeks for follow-up with repeat labs on return.  He is anxious to make a trip back home however I will have him wait and discussed with Dr. Gan plans for ongoing therapy and scans to assess for response prior to any travels.     Prostate cancer   2/4/2015 Cancer Staged    Staging form: Prostate, AJCC 8th Edition  - Pathologic stage from 2/4/2015: Stage BENNIE (pT2, pN1, cM0, PSA: 5, Grade Group: 2) - Signed by Niraj Otoole MD on 9/20/2021 9/20/2021 Initial Diagnosis    Prostate cancer (CMS/HCC)     9/30/2021 - 11/12/2021 Radiation    Radiation OncologyTreatment Course:  Jeb Russo received 6400 cGy in 32 fractions to prostate gland via External Beam Radiation - EBRT.     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         HISTORY OF PRESENT ILLNESS:  The patient is a 73 y.o. male, here for follow up on management  "of PSA responsive hormone sensitive prostate cancer metastasis    Past Medical History:   Diagnosis Date   • Cancer     prostate   • Coronary artery disease    • Encounter for pacemaker at end of battery life 04/02/2021    Added automatically from request for surgery [id number]   • Prostate cancer      Past Surgical History:   Procedure Laterality Date   • ANKLE SURGERY     • CARDIAC ELECTROPHYSIOLOGY PROCEDURE N/A 04/05/2021    Procedure: PPM generator change - dual  [employer];  Surgeon: Valentina Saini MD;  Location:  [facility];  Service: Cardiology;  Laterality: N/A;   • CARDIAC ELECTROPHYSIOLOGY PROCEDURE N/A 11/01/2021    Procedure: Pocket Revision to submuscular, no meds to hold;  Surgeon: Anant Jaramillo MD;  Location:  [facility];  Service: Cardiology;  Laterality: N/A;   • COLONOSCOPY     • CYBERKNIFE  08/12/2022    Metastatic left iliac LN, left periaortic LN   • INSERT / REPLACE / REMOVE PACEMAKER  2009   • KNEE ACL RECONSTRUCTION     • KNEE ACL RECONSTRUCTION     • PROSTATE SURGERY  2015       No Known Allergies    Family History and Social History reviewed and changed as necessary    REVIEW OF SYSTEM:   No new somatic complaint    PHYSICAL EXAM:  No jaundice icterus pallor or respiratory distress.  No rashes.    Vitals:    05/17/24 0945   BP: 140/71   Pulse: 71   Resp: 18   Temp: 97.5 °F (36.4 °C)   SpO2: 97%   Weight: 96.2 kg (212 lb)   Height: 171.5 cm (67.5\")     Vitals:    05/17/24 0945   PainSc: 0-No pain          ECOG score: 1           Vitals reviewed.    ECOG: (1) Restricted in Physically Strenuous Activity, Ambulatory & Able to Do Work of Light Nature    Lab Results   Component Value Date    HGB 15.0 05/09/2024    HCT 45.6 05/09/2024    MCV 86.2 05/09/2024     05/09/2024    WBC 5.82 05/09/2024    NEUTROABS 4.00 05/09/2024    LYMPHSABS 1.13 05/09/2024    MONOSABS 0.46 05/09/2024    EOSABS 0.18 05/09/2024    BASOSABS 0.04 05/09/2024       Lab " Results   Component Value Date    GLUCOSE 119 (H) 05/09/2024    BUN 16 05/09/2024    CREATININE 1.03 05/09/2024     05/09/2024    K 5.0 05/09/2024     05/09/2024    CO2 27.0 05/09/2024    CALCIUM 10.0 05/09/2024    PROTEINTOT 6.8 05/09/2024    ALBUMIN 4.0 05/09/2024    BILITOT 0.3 05/09/2024    ALKPHOS 61 05/09/2024    AST 35 05/09/2024    ALT 28 05/09/2024             ASSESSMENT & PLAN:  1.  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  Salvage external beam radiation 6400 cGy in 32 fractions ending 11/12/2021.  Stereotactic radiotherapy to 2 focal positive metastatic sites 1 year later.  PSA 8 September 2023.  Lupron started 45 mg every 6 months.  September 2023 PSA still 8.  September 23 PET showed 3 small newly hypermetabolic foci left mid abdomen and pelvis corresponding to normal-sized lymph nodes on CT suspicious for metastatic disease and to previously noted hypermetabolic nodes July 2022 no longer identified.  January 2024 PSA 9.2 3/15/2024 Pylarify PET shows increase in size and uptake of retroperitoneal adenopathy consistent with metastatic disease.  3/18/2024 PSA 11.5.  3/18/2024 follow-up with Dr. Grossman medical oncology.  He recommended Erleada or Xtandi but no chemotherapy.  Plan molecular testing with next generation sequencing and genetic counseling.    -3/25/2024 [facility] medical oncology follow-up: The patient had seen my partner Dr. Corcoran back in September and subsequently Dr. Otoole and in the meantime had seen Dr. Grossman at [facility].  I reviewed the above recommendations from Dr. Grossman and I confirmed with the patient that those are fine recommendations.  He also has the option of Zytiga prednisone which we discussed and I also discussed in addition the need for Zometa every 6 months to prevent osteoporosis.  He is willing to try what ever I recommend and I would lean  towards Erleada.  We discussed the side effects in detail and he will have a cancer treatment preparation visit with our pharmacists.  I would not for the moment go after this with CyberKnife unless he developed refractory symptomatic spots of disease.  I would not do next generation sequencing at this junction on his tissue from about 10 years ago but would procure tissue when he gets to the hormone refractory phase of his treatment and would send serum as well as tissue NexGen sequencing at that point.  We will make referrals to genetic testing which is standard for all patients with metastatic prostate cancer and may afford targets for therapy in the future as well.  He understands the above plan and is willing to proceed.  He will get his second Lupron shot today.He will see my nurse practitioner monthly with blood count chemistry and PSA and make sure he is tolerating this and in 3 months we will repeat the PSMA PET.  If we are making the progress that we need to see then perhaps we can back off on the frequency of the labs and the visits but I want to make sure he tolerates this well as we do have lateral hormonal blockade options.    -3/25/2024 received Lupron and Zometa  -4/1/2024 chemotherapy education preparation and needs assessment completed    -4/5/2024 began apalutamide    -4/17/2024  Mr. Russo's genetic test was negative for pathogenic mutations in all 49 - cancer risk genes analyzed.  Mr. 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  Per the American College of Medical Genetics (ACMG) guidelines, this VUS is not to be used in clinical decision-making, and is to be considered a negative result until further evidence supports reclassification of this variant.     -5/9/2024 PSA 0.188  -5/10/2024 [facility] clinic follow-up: He is doing well on current therapy with apalutamide, Lupron and Zometa.  Clinically seems to be having an excellent  response thus far with a drop in his PSA now down to 0.188.  He is having no significant side effects from his treatment.  He did have some bone pain and fatigue for a few days after Zometa but that resolved.  Physically he is feeling quite well.  He will continue treatment unchanged.  We will see him back in 4 weeks for follow-up with repeat labs on return.  He is anxious to make a trip back home however I will have him wait and discussed with Dr. Gan plans for ongoing therapy and scans to assess for response prior to any travels.    -5/17/2024 [facility] oncology follow-up: He returns today for logistical discussions.  See my March note for overall plan but the following I think is a reasonable plan.  He will see my note nurse practitioner July 5 for labs including PSA which is going down nicely and at that point she will set him up for a PSMA PET the middle of July.  If that looks good and the PSA is coming down nicely then I think before his Lupron and Zometa the end of September would be fine for him to go home to [facility] with a CBC CMP and PSA towards the end of August in [facility] which he will get the information to us.  He is pleased with how he is feeling on the apalutamide Lupron and Zometa.  He understands the plan for indefinite salvage hormone blockade for hormone sensitive prostate cancer with a HOXB13 VUS.    Total time of care today inclusive of time spent today reviewing interval data since last I have seen him as well as discussing this complex decision tree as outlined above took 30 minutes of patient care time throughout the day today.  Bright Gan MD    05/17/2024

## 2024-06-07 ENCOUNTER — SPECIALTY PHARMACY (OUTPATIENT)
Facility: HOSPITAL | Age: 74
End: 2024-06-07
Payer: MEDICARE

## 2024-06-07 ENCOUNTER — LAB (OUTPATIENT)
Dept: LAB | Facility: HOSPITAL | Age: 74
End: 2024-06-07
Payer: MEDICARE

## 2024-06-07 DIAGNOSIS — C61 PROSTATE CANCER: ICD-10-CM

## 2024-06-07 LAB
ALBUMIN SERPL-MCNC: 3.9 G/DL (ref 3.5–5.2)
ALBUMIN/GLOB SERPL: 1.6 G/DL
ALP SERPL-CCNC: 47 U/L (ref 39–117)
ALT SERPL W P-5'-P-CCNC: 14 U/L (ref 1–41)
ANION GAP SERPL CALCULATED.3IONS-SCNC: 5 MMOL/L (ref 5–15)
AST SERPL-CCNC: 18 U/L (ref 1–40)
BASOPHILS # BLD AUTO: 0.05 10*3/MM3 (ref 0–0.2)
BASOPHILS NFR BLD AUTO: 1 % (ref 0–1.5)
BILIRUB SERPL-MCNC: 0.4 MG/DL (ref 0–1.2)
BUN SERPL-MCNC: 17 MG/DL (ref 8–23)
BUN/CREAT SERPL: 18.1 (ref 7–25)
CALCIUM SPEC-SCNC: 10.2 MG/DL (ref 8.6–10.5)
CHLORIDE SERPL-SCNC: 105 MMOL/L (ref 98–107)
CO2 SERPL-SCNC: 29 MMOL/L (ref 22–29)
CREAT SERPL-MCNC: 0.94 MG/DL (ref 0.76–1.27)
DEPRECATED RDW RBC AUTO: 47 FL (ref 37–54)
EGFRCR SERPLBLD CKD-EPI 2021: 85.1 ML/MIN/1.73
EOSINOPHIL # BLD AUTO: 0.18 10*3/MM3 (ref 0–0.4)
EOSINOPHIL NFR BLD AUTO: 3.6 % (ref 0.3–6.2)
ERYTHROCYTE [DISTWIDTH] IN BLOOD BY AUTOMATED COUNT: 14.6 % (ref 12.3–15.4)
GLOBULIN UR ELPH-MCNC: 2.5 GM/DL
GLUCOSE SERPL-MCNC: 120 MG/DL (ref 65–99)
HCT VFR BLD AUTO: 46.2 % (ref 37.5–51)
HGB BLD-MCNC: 15.1 G/DL (ref 13–17.7)
IMM GRANULOCYTES # BLD AUTO: 0.02 10*3/MM3 (ref 0–0.05)
IMM GRANULOCYTES NFR BLD AUTO: 0.4 % (ref 0–0.5)
LYMPHOCYTES # BLD AUTO: 1.1 10*3/MM3 (ref 0.7–3.1)
LYMPHOCYTES NFR BLD AUTO: 22 % (ref 19.6–45.3)
MCH RBC QN AUTO: 28.5 PG (ref 26.6–33)
MCHC RBC AUTO-ENTMCNC: 32.7 G/DL (ref 31.5–35.7)
MCV RBC AUTO: 87.3 FL (ref 79–97)
MONOCYTES # BLD AUTO: 0.48 10*3/MM3 (ref 0.1–0.9)
MONOCYTES NFR BLD AUTO: 9.6 % (ref 5–12)
NEUTROPHILS NFR BLD AUTO: 3.16 10*3/MM3 (ref 1.7–7)
NEUTROPHILS NFR BLD AUTO: 63.4 % (ref 42.7–76)
PLATELET # BLD AUTO: 227 10*3/MM3 (ref 140–450)
PMV BLD AUTO: 9.3 FL (ref 6–12)
POTASSIUM SERPL-SCNC: 4.6 MMOL/L (ref 3.5–5.2)
PROT SERPL-MCNC: 6.4 G/DL (ref 6–8.5)
PSA SERPL-MCNC: 0.15 NG/ML (ref 0–4)
RBC # BLD AUTO: 5.29 10*6/MM3 (ref 4.14–5.8)
SODIUM SERPL-SCNC: 139 MMOL/L (ref 136–145)
WBC NRBC COR # BLD AUTO: 4.99 10*3/MM3 (ref 3.4–10.8)

## 2024-06-07 PROCEDURE — 84153 ASSAY OF PSA TOTAL: CPT

## 2024-06-07 PROCEDURE — 80053 COMPREHEN METABOLIC PANEL: CPT

## 2024-06-07 PROCEDURE — 36415 COLL VENOUS BLD VENIPUNCTURE: CPT

## 2024-06-07 PROCEDURE — 85025 COMPLETE CBC W/AUTO DIFF WBC: CPT

## 2024-06-13 DIAGNOSIS — C61 PROSTATE CANCER: Primary | ICD-10-CM

## 2024-06-13 DIAGNOSIS — C77.2 SECONDARY AND UNSPECIFIED MALIGNANT NEOPLASM OF INTRA-ABDOMINAL LYMPH NODES: ICD-10-CM

## 2024-06-14 ENCOUNTER — TELEPHONE (OUTPATIENT)
Dept: ONCOLOGY | Facility: CLINIC | Age: 74
End: 2024-06-14
Payer: MEDICARE

## 2024-06-14 DIAGNOSIS — C61 PROSTATE CANCER: Primary | ICD-10-CM

## 2024-06-14 NOTE — TELEPHONE ENCOUNTER
"----- Message from Bright Gan sent at 6/14/2024 12:51 PM EDT -----  Regarding: RE: PSMA PET SCAN  Notify patient that PSMA pet cancelled due to good news on PSA and get CT C/A/P and Total body bone scan for alternate baseline for serial monitoring ( to be done before going to [facility]) .  ----- Message -----  From: Zahraa Ojeda APRN  Sent: 6/14/2024  10:41 AM EDT  To: Bright Gan MD  Subject: FW: PSMA PET SCAN                                FYI  ----- Message -----  From: Georgia Huang  Sent: 6/14/2024  10:28 AM EDT  To: EMANI Jaimes; [facility] Nurse Pool  Subject: PSMA PET SCAN                                    Patient is scheduled for PSMA on 7/9 upon review it is noted patient PSA levels are dropping with current PSA 6/7=0.15, 5/9/24=0.18. PSMA are not covered for treatment response, they are only used for suspected biochemical recurrence based on elevated PSA levels, and patients are currently decreasing show are response to treatment. \"October 19, 2021 - Revised 09/08/2023 Use in suspected recurrence:  The FDA limits to suspected recurrence-based history with elevated serum prostate-specific antigen (PSA) levels. Therefore, both diagnosis of prostate cancer and elevated PSA levels must be documented in medical record\".   Therefore PSMA is not covered    Thank you   Gladys   PET compliance  "

## 2024-06-14 NOTE — TELEPHONE ENCOUNTER
Called and notified patient that PET would be cancelled and CT scans and bone scan would be ordered alternatively due to decreasing PSA. Patient verbalized understanding.

## 2024-06-26 ENCOUNTER — HOSPITAL ENCOUNTER (OUTPATIENT)
Facility: HOSPITAL | Age: 74
Discharge: HOME | End: 2024-06-26
Admitting: INTERNAL MEDICINE
Payer: MEDICARE

## 2024-06-26 DIAGNOSIS — C61 PROSTATE CANCER: ICD-10-CM

## 2024-06-26 PROCEDURE — 25510000001 IOPAMIDOL 61 % SOLUTION: Performed by: INTERNAL MEDICINE

## 2024-06-26 PROCEDURE — 71260 CT THORAX DX C+: CPT

## 2024-06-26 PROCEDURE — 74177 CT ABD & PELVIS W/CONTRAST: CPT

## 2024-06-26 RX ADMIN — IOPAMIDOL 85 ML: 612 INJECTION, SOLUTION INTRAVENOUS at 13:17

## 2024-06-27 ENCOUNTER — LAB (OUTPATIENT)
Dept: LAB | Facility: HOSPITAL | Age: 74
End: 2024-06-27
Payer: MEDICARE

## 2024-06-27 DIAGNOSIS — C61 PROSTATE CANCER: ICD-10-CM

## 2024-06-27 LAB
ALBUMIN SERPL-MCNC: 3.9 G/DL (ref 3.5–5.2)
ALBUMIN/GLOB SERPL: 1.5 G/DL
ALP SERPL-CCNC: 50 U/L (ref 39–117)
ALT SERPL W P-5'-P-CCNC: 23 U/L (ref 1–41)
ANION GAP SERPL CALCULATED.3IONS-SCNC: 5 MMOL/L (ref 5–15)
AST SERPL-CCNC: 26 U/L (ref 1–40)
BASOPHILS # BLD AUTO: 0.04 10*3/MM3 (ref 0–0.2)
BASOPHILS NFR BLD AUTO: 0.8 % (ref 0–1.5)
BILIRUB SERPL-MCNC: 0.3 MG/DL (ref 0–1.2)
BUN SERPL-MCNC: 14 MG/DL (ref 8–23)
BUN/CREAT SERPL: 13.2 (ref 7–25)
CALCIUM SPEC-SCNC: 9.6 MG/DL (ref 8.6–10.5)
CHLORIDE SERPL-SCNC: 106 MMOL/L (ref 98–107)
CO2 SERPL-SCNC: 28 MMOL/L (ref 22–29)
CREAT SERPL-MCNC: 1.06 MG/DL (ref 0.76–1.27)
DEPRECATED RDW RBC AUTO: 47.2 FL (ref 37–54)
EGFRCR SERPLBLD CKD-EPI 2021: 73.6 ML/MIN/1.73
EOSINOPHIL # BLD AUTO: 0.23 10*3/MM3 (ref 0–0.4)
EOSINOPHIL NFR BLD AUTO: 4.3 % (ref 0.3–6.2)
ERYTHROCYTE [DISTWIDTH] IN BLOOD BY AUTOMATED COUNT: 14.3 % (ref 12.3–15.4)
GLOBULIN UR ELPH-MCNC: 2.6 GM/DL
GLUCOSE SERPL-MCNC: 110 MG/DL (ref 65–99)
HCT VFR BLD AUTO: 46.2 % (ref 37.5–51)
HGB BLD-MCNC: 14.9 G/DL (ref 13–17.7)
IMM GRANULOCYTES # BLD AUTO: 0.03 10*3/MM3 (ref 0–0.05)
IMM GRANULOCYTES NFR BLD AUTO: 0.6 % (ref 0–0.5)
LYMPHOCYTES # BLD AUTO: 1.4 10*3/MM3 (ref 0.7–3.1)
LYMPHOCYTES NFR BLD AUTO: 26.4 % (ref 19.6–45.3)
MCH RBC QN AUTO: 28.8 PG (ref 26.6–33)
MCHC RBC AUTO-ENTMCNC: 32.3 G/DL (ref 31.5–35.7)
MCV RBC AUTO: 89.4 FL (ref 79–97)
MONOCYTES # BLD AUTO: 0.5 10*3/MM3 (ref 0.1–0.9)
MONOCYTES NFR BLD AUTO: 9.4 % (ref 5–12)
NEUTROPHILS NFR BLD AUTO: 3.11 10*3/MM3 (ref 1.7–7)
NEUTROPHILS NFR BLD AUTO: 58.5 % (ref 42.7–76)
PLATELET # BLD AUTO: 209 10*3/MM3 (ref 140–450)
PMV BLD AUTO: 9.3 FL (ref 6–12)
POTASSIUM SERPL-SCNC: 4.4 MMOL/L (ref 3.5–5.2)
PROT SERPL-MCNC: 6.5 G/DL (ref 6–8.5)
PSA SERPL-MCNC: 0.09 NG/ML (ref 0–4)
RBC # BLD AUTO: 5.17 10*6/MM3 (ref 4.14–5.8)
SODIUM SERPL-SCNC: 139 MMOL/L (ref 136–145)
WBC NRBC COR # BLD AUTO: 5.31 10*3/MM3 (ref 3.4–10.8)

## 2024-06-27 PROCEDURE — 84153 ASSAY OF PSA TOTAL: CPT

## 2024-06-27 PROCEDURE — 36415 COLL VENOUS BLD VENIPUNCTURE: CPT

## 2024-06-27 PROCEDURE — 80053 COMPREHEN METABOLIC PANEL: CPT

## 2024-06-27 PROCEDURE — 85025 COMPLETE CBC W/AUTO DIFF WBC: CPT

## 2024-07-01 ENCOUNTER — OFFICE VISIT (OUTPATIENT)
Dept: ONCOLOGY | Facility: CLINIC | Age: 74
End: 2024-07-01
Payer: MEDICARE

## 2024-07-01 VITALS
HEIGHT: 68 IN | SYSTOLIC BLOOD PRESSURE: 161 MMHG | WEIGHT: 212 LBS | RESPIRATION RATE: 18 BRPM | DIASTOLIC BLOOD PRESSURE: 71 MMHG | TEMPERATURE: 98 F | OXYGEN SATURATION: 99 % | BODY MASS INDEX: 32.13 KG/M2 | HEART RATE: 72 BPM

## 2024-07-01 DIAGNOSIS — C61 PROSTATE CANCER: Primary | ICD-10-CM

## 2024-07-01 PROCEDURE — 1159F MED LIST DOCD IN RCRD: CPT | Performed by: NURSE PRACTITIONER

## 2024-07-01 PROCEDURE — 99214 OFFICE O/P EST MOD 30 MIN: CPT | Performed by: NURSE PRACTITIONER

## 2024-07-01 PROCEDURE — 1160F RVW MEDS BY RX/DR IN RCRD: CPT | Performed by: NURSE PRACTITIONER

## 2024-07-01 PROCEDURE — 1126F AMNT PAIN NOTED NONE PRSNT: CPT | Performed by: NURSE PRACTITIONER

## 2024-07-01 RX ORDER — LANOLIN ALCOHOL/MO/W.PET/CERES
1 CREAM (GRAM) TOPICAL DAILY
COMMUNITY
Start: 2024-04-04

## 2024-07-01 NOTE — PROGRESS NOTES
CHIEF COMPLAINT: Prostate cancer    Problem List:  Oncology/Hematology History Overview Note   1.  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  Salvage external beam radiation 6400 cGy in 32 fractions ending 11/12/2021.  Stereotactic radiotherapy to 2 focal positive metastatic sites 1 year later.  PSA 8 September 2023.  Lupron started 45 mg every 6 months.  September 2023 PSA still 8.  September 23 PET showed 3 small newly hypermetabolic foci left mid abdomen and pelvis corresponding to normal-sized lymph nodes on CT suspicious for metastatic disease and to previously noted hypermetabolic nodes July 2022 no longer identified.  January 2024 PSA 9.2 3/15/2024 Pylarify PET shows increase in size and uptake of retroperitoneal adenopathy consistent with metastatic disease.  3/18/2024 PSA 11.5.  3/18/2024 follow-up with Dr. Grossman medical oncology.  He recommended Erleada or Xtandi but no chemotherapy.  Plan molecular testing with next generation sequencing and genetic counseling.    -3/25/2024 [facility] oncology follow-up: The patient had seen my partner Dr. Corcoran back in September and subsequently Dr. Otoole and in the meantime had seen Dr. Grossman at [facility].  I reviewed the above recommendations from Dr. Grossman and I confirmed with the patient that those are fine recommendations.  He also has the option of Zytiga prednisone which we discussed and I also discussed in addition the need for Zometa every 6 months to prevent osteoporosis.  He is willing to try what ever I recommend and I would lean towards Erleada.  We discussed the side effects in detail and he will have a cancer treatment preparation visit with our pharmacists.  I would not for the moment go after this with CyberKnife unless he developed refractory symptomatic spots of disease.  I would not do next generation sequencing at this  junction on his tissue from about 10 years ago but would procure tissue when he gets to the hormone refractory phase of his treatment and would send serum as well as tissue NexGen sequencing at that point.  We will make referrals to genetic testing which is standard for all patients with metastatic prostate cancer and may afford targets for therapy in the future as well.  He understands the above plan and is willing to proceed.  He will get his second Lupron shot today.He will see my nurse practitioner monthly with blood count chemistry and PSA and make sure he is tolerating this and in 3 months we will repeat the PSMA PET.  If we are making the progress that we need to see then perhaps we can back off on the frequency of the labs and the visits but I want to make sure he tolerates this well as we do have lateral hormonal blockade options.    -3/25/2024 received Lupron and Zometa  -4/1/2024 chemotherapy education preparation and needs assessment completed    -4/5/2024 began apalutamide    -4/17/2024  Mr. Russo's genetic test was negative for pathogenic mutations in all 49 - cancer risk genes analyzed.  Mr. 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  Per the [employer] College [employer]etics (ACMG) guidelines, this VUS is not to be used in clinical decision-making, and is to be considered a negative result until further evidence supports reclassification of this variant.     -5/9/2024 PSA 0.188  -5/10/2024 [facility] Oncology clinic follow-up: He is doing well on current therapy with apalutamide, Lupron and Zometa.  Clinically seems to be having an excellent response thus far with a drop in his PSA now down to 0.188.  He is having no significant side effects from his treatment.  He did have some bone pain and fatigue for a few days after Zometa but that resolved.  Physically he is feeling quite well.  He will continue treatment unchanged.  We will see him  back in 4 weeks for follow-up with repeat labs on return.  He is anxious to make a trip back home however I will have him wait and discussed with Dr. Gan plans for ongoing therapy and scans to assess for response prior to any travels.    -5/17/2024 [facility] follow-up: He returns today for logistical discussions.  See my March note for overall plan but the following I think is a reasonable plan.  He will see my note nurse practitioner July 5 for labs including PSA which is going down nicely and at that point she will set him up for a PSMA PET the middle of July.  If that looks good and the PSA is coming down nicely then I think before his Lupron and Zometa the end of September would be fine for him to go home to [facility] with a CBC CMP and PSA towards the end of August in [facility] which he will get the information to us.  He is pleased with how he is feeling on the apalutamide Lupron and Zometa.  He understands the plan for indefinite salvage hormone blockade for hormone sensitive prostate cancer with a HOXB13 VUS.    -6/26/2024 CT chest, abdomen and pelvis shows a few sclerotic foci within thoracic vertebrae which are nonspecific.  These appear unchanged compared to 2021 study which may represent bone islands versus remote sclerotic mets.  Chronic changes within the lung with no suspicious nodules.  Abdomen and pelvis shows previously noted hypermetabolic lymph nodes decreased in size from comparison compatible with a response to therapy.  No evidence of progression or new disease.  Postsurgical changes from prior prostatectomy.  -PSA 0.086     Prostate cancer   2/4/2015 Cancer Staged    Staging form: Prostate, AJCC 8th Edition  - Pathologic stage from 2/4/2015: Stage BENNIE (pT2, pN1, cM0, PSA: 5, Grade Group: 2) - Signed by Niraj Otoole MD on 9/20/2021 9/20/2021 Initial Diagnosis    Prostate cancer (CMS/HCC)     9/30/2021 - 11/12/2021 Radiation    Radiation OncologyTreatment Course:  Jeb Russo  received 6400 cGy in 32 fractions to prostate gland via External Beam Radiation - EBRT.     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         HISTORY OF PRESENT ILLNESS:  The patient is a 73 y.o. male, here for follow up on management of prostate cancer currently on therapy with apalutamide, Lupron and Zometa every 6 months.  Jeb continues to do well on current therapy with no new concerns.  He has no new pain.  He is eating well, no change in his health or new concerns since we saw him last.    Past Medical History:   Diagnosis Date    Cancer     prostate    Coronary artery disease     Encounter for pacemaker at end of battery life 04/02/2021    Added automatically from request for surgery [id number]    Prostate cancer      Past Surgical History:   Procedure Laterality Date    ANKLE SURGERY      CARDIAC ELECTROPHYSIOLOGY PROCEDURE N/A 04/05/2021    Procedure: PPM generator change - dual  [employer];  Surgeon: Valentina Saini MD;  Location:  [facility];  Service: Cardiology;  Laterality: N/A;    CARDIAC ELECTROPHYSIOLOGY PROCEDURE N/A 11/01/2021    Procedure: Pocket Revision to submuscular, no meds to hold;  Surgeon: Anant Jaramillo MD;  Location:  [facility];  Service: Cardiology;  Laterality: N/A;    COLONOSCOPY      CYBERKNIFE  08/12/2022    Metastatic left iliac LN, left periaortic LN    INSERT / REPLACE / REMOVE  "PACEMAKER  2009    KNEE ACL RECONSTRUCTION      KNEE ACL RECONSTRUCTION      PROSTATE SURGERY  2015       No Known Allergies    Family History and Social History reviewed and changed as necessary    REVIEW OF SYSTEM:   No new somatic concerns    PHYSICAL EXAM:  Well-developed, well-nourished healthy appearing male in no distress  Respirations regular and unlabored    Vitals:    07/01/24 0832   BP: 161/71   Pulse: 72   Resp: 18   Temp: 98 °F (36.7 °C)   SpO2: 99%   Weight: 96.2 kg (212 lb)   Height: 171.5 cm (67.5\")     Vitals:    07/01/24 0832   PainSc: 0-No pain          ECOG score: 1           Vitals reviewed.  Labs reviewed along with CT scans    ECOG: (1) Restricted in Physically Strenuous Activity, Ambulatory & Able to Do Work of Light Nature    Lab Results   Component Value Date    HGB 14.9 06/27/2024    HCT 46.2 06/27/2024    MCV 89.4 06/27/2024     06/27/2024    WBC 5.31 06/27/2024    NEUTROABS 3.11 06/27/2024    LYMPHSABS 1.40 06/27/2024    MONOSABS 0.50 06/27/2024    EOSABS 0.23 06/27/2024    BASOSABS 0.04 06/27/2024       Lab Results   Component Value Date    GLUCOSE 110 (H) 06/27/2024    BUN 14 06/27/2024    CREATININE 1.06 06/27/2024     06/27/2024    K 4.4 06/27/2024     06/27/2024    CO2 28.0 06/27/2024    CALCIUM 9.6 06/27/2024    PROTEINTOT 6.5 06/27/2024    ALBUMIN 3.9 06/27/2024    BILITOT 0.3 06/27/2024    ALKPHOS 50 06/27/2024    AST 26 06/27/2024    ALT 23 06/27/2024             ASSESSMENT & PLAN:  1.  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  Salvage external beam radiation 6400 cGy in 32 fractions ending 11/12/2021.  Stereotactic radiotherapy to 2 focal positive metastatic sites 1 year later.  PSA 8 September 2023.  Lupron started 45 mg every 6 months.  September 2023 PSA still 8.  September 23 PET showed 3 small newly hypermetabolic foci left mid abdomen and " pelvis corresponding to normal-sized lymph nodes on CT suspicious for metastatic disease and to previously noted hypermetabolic nodes July 2022 no longer identified.  January 2024 PSA 9.2 3/15/2024 Pylarify PET shows increase in size and uptake of retroperitoneal adenopathy consistent with metastatic disease.  3/18/2024 PSA 11.5.  3/18/2024 follow-up with Dr. Grossman medical oncology.  He recommended Erleada or Xtandi but no chemotherapy.  Plan molecular testing with next generation sequencing and genetic counseling.    -3/25/2024 [facility] oncology follow-up: The patient had seen my partner Dr. Corcoran back in September and subsequently Dr. Otoole and in the meantime had seen Dr. Grossman at [facility].  I reviewed the above recommendations from Dr. Grossman and I confirmed with the patient that those are fine recommendations.  He also has the option of Zytiga prednisone which we discussed and I also discussed in addition the need for Zometa every 6 months to prevent osteoporosis.  He is willing to try what ever I recommend and I would lean towards Erleada.  We discussed the side effects in detail and he will have a cancer treatment preparation visit with our pharmacists.  I would not for the moment go after this with CyberKnife unless he developed refractory symptomatic spots of disease.  I would not do next generation sequencing at this junction on his tissue from about 10 years ago but would procure tissue when he gets to the hormone refractory phase of his treatment and would send serum as well as tissue NexGen sequencing at that point.  We will make referrals to genetic testing which is standard for all patients with metastatic prostate cancer and may afford targets for therapy in the future as well.  He understands the above plan and is willing to proceed.  He will get his second Lupron shot today.He will see my nurse practitioner monthly with blood count chemistry and PSA and make sure he is tolerating this  and in 3 months we will repeat the PSMA PET.  If we are making the progress that we need to see then perhaps we can back off on the frequency of the labs and the visits but I want to make sure he tolerates this well as we do have lateral hormonal blockade options.    -3/25/2024 received Lupron and Zometa  -4/1/2024 chemotherapy education preparation and needs assessment completed    -4/5/2024 began apalutamide    -4/17/2024  Mr. Russo's genetic test was negative for pathogenic mutations in all 49 - cancer risk genes analyzed.  Mr. 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  Per the American College of Medical Genetics (ACMG) guidelines, this VUS is not to be used in clinical decision-making, and is to be considered a negative result until further evidence supports reclassification of this variant.     -5/9/2024 PSA 0.188  -5/10/2024 [facility] follow-up: He is doing well on current therapy with apalutamide, Lupron and Zometa.  Clinically seems to be having an excellent response thus far with a drop in his PSA now down to 0.188.  He is having no significant side effects from his treatment.  He did have some bone pain and fatigue for a few days after Zometa but that resolved.  Physically he is feeling quite well.  He will continue treatment unchanged.  We will see him back in 4 weeks for follow-up with repeat labs on return.  He is anxious to make a trip back home however I will have him wait and discussed with Dr. Gan plans for ongoing therapy and scans to assess for response prior to any travels.    -5/17/2024 [facility] follow-up: He returns today for logistical discussions.  See my March note for overall plan but the following I think is a reasonable plan.  He will see my note nurse practitioner July 5 for labs including PSA which is going down nicely and at that point she will set him up for a PSMA PET the middle of July.  If that  looks good and the PSA is coming down nicely then I think before his Lupron and Zometa the end of September would be fine for him to go home to [place] with a CBC CMP and PSA towards the end of August in [place] which he will get the information to us.  He is pleased with how he is feeling on the apalutamide Lupron and Zometa.  He understands the plan for indefinite salvage hormone blockade for hormone sensitive prostate cancer with a HOXB13 VUS.    -7/1/2024 [facility] Oncology clinic follow-up: Continues to do well on current therapy with apalutamide, Lupron and Zometa.  He is tolerating well with no unusual side effects.  His PSA continues to decline, current PSA 0.086.  CT chest, abdomen and pelvis show good response with previously noted hypermetabolic lymph nodes decreased in size from comparison compatible with response to therapy.  No evidence of progression or new disease.  He will continue treatment unchanged, he will be due for Lupron and Zometa in late September and we will see him back at that time.    I spent 30 minutes caring for Jeb on this date of service. This time includes time spent by me in the following activities: preparing for the visit, reviewing tests, performing a medically appropriate examination and/or evaluation, ordering medications, tests, or procedures, documenting information in the medical record, independently interpreting results and communicating that information with the patient/family/caregiver, and care coordination.     Zharaa Ojeda, APRN    07/01/2024

## 2024-07-03 ENCOUNTER — SPECIALTY PHARMACY (OUTPATIENT)
Facility: HOSPITAL | Age: 74
End: 2024-07-03
Payer: MEDICARE

## 2024-08-08 DIAGNOSIS — C61 PROSTATE CANCER: ICD-10-CM

## 2024-08-08 RX ORDER — APALUTAMIDE 240 MG/1
TABLET, FILM COATED ORAL
Qty: 30 TABLET | Refills: 5 | Status: SHIPPED | OUTPATIENT
Start: 2024-08-08

## 2024-08-12 ENCOUNTER — TELEPHONE (OUTPATIENT)
Dept: ONCOLOGY | Facility: CLINIC | Age: 74
End: 2024-08-12
Payer: MEDICARE

## 2024-08-12 NOTE — TELEPHONE ENCOUNTER
Called patients sister and left VM informing her that it would be preferable if patients sister can bring the lab results to the office.

## 2024-08-12 NOTE — TELEPHONE ENCOUNTER
Caller: Italo Angel    Relationship: Emergency Contact    Best call back number: [phone]      Who are you requesting to speak with (clinical staff, provider,  specific staff member): CLINICAL    What was the call regarding: PT'S SISTER NEEDING EMAIL ADDRESS TO EMAIL PT'S PSA RESULTS.  HER EMAIL [email or web address]   PT IS [place] COUN[place] WITH [employer] AND IS UNABLE TO SEND THRU [employer]

## 2024-09-12 ENCOUNTER — TELEPHONE (OUTPATIENT)
Dept: ONCOLOGY | Facility: CLINIC | Age: 74
End: 2024-09-12
Payer: MEDICARE

## 2024-09-12 NOTE — TELEPHONE ENCOUNTER
Called and informed patients sister that he does not need any scans prior to  his follow up and labs can be drawn a couple of days in advance or if he would like they can just be drawn the day of. She verbalized understanding.

## 2024-09-12 NOTE — TELEPHONE ENCOUNTER
Caller: Italo Angel    Relationship: Emergency Contact    Best call back number: [phone]    What is the best time to reach you: ANY    Who are you requesting to speak with (clinical staff, provider,  specific staff member): CLINICAL     What was the call regarding: ITALO IS CALLING STATES THAT JOHNATHON HAS AN APPOINTMENT ON 9-30  SHE WOULD LIKE TO SPEAK TO THE NURSE REGARDING GETTING A PET SCAN OR ANY OTHER SCANS AND OR LABS SCHEDULED PRIOR TO HIS APPOINTMENT       PLEASE ADVISE

## 2024-09-25 ENCOUNTER — LAB (OUTPATIENT)
Dept: LAB | Facility: HOSPITAL | Age: 74
End: 2024-09-25
Payer: MEDICARE

## 2024-09-25 DIAGNOSIS — C61 PROSTATE CANCER: ICD-10-CM

## 2024-09-25 DIAGNOSIS — Z79.818 ANDROGEN DEPRIVATION THERAPY: ICD-10-CM

## 2024-09-25 LAB
ALBUMIN SERPL-MCNC: 4.1 G/DL (ref 3.5–5.2)
ALBUMIN/GLOB SERPL: 1.6 G/DL
ALP SERPL-CCNC: 68 U/L (ref 39–117)
ALT SERPL W P-5'-P-CCNC: 21 U/L (ref 1–41)
ANION GAP SERPL CALCULATED.3IONS-SCNC: 9 MMOL/L (ref 5–15)
AST SERPL-CCNC: 23 U/L (ref 1–40)
BASOPHILS # BLD AUTO: 0.03 10*3/MM3 (ref 0–0.2)
BASOPHILS NFR BLD AUTO: 0.4 % (ref 0–1.5)
BILIRUB SERPL-MCNC: 0.2 MG/DL (ref 0–1.2)
BUN SERPL-MCNC: 17 MG/DL (ref 8–23)
BUN/CREAT SERPL: 16 (ref 7–25)
CALCIUM SPEC-SCNC: 10.5 MG/DL (ref 8.6–10.5)
CHLORIDE SERPL-SCNC: 104 MMOL/L (ref 98–107)
CO2 SERPL-SCNC: 27 MMOL/L (ref 22–29)
CREAT SERPL-MCNC: 1.06 MG/DL (ref 0.76–1.27)
DEPRECATED RDW RBC AUTO: 44.3 FL (ref 37–54)
EGFRCR SERPLBLD CKD-EPI 2021: 73.2 ML/MIN/1.73
EOSINOPHIL # BLD AUTO: 0.15 10*3/MM3 (ref 0–0.4)
EOSINOPHIL NFR BLD AUTO: 2.2 % (ref 0.3–6.2)
ERYTHROCYTE [DISTWIDTH] IN BLOOD BY AUTOMATED COUNT: 13.2 % (ref 12.3–15.4)
GLOBULIN UR ELPH-MCNC: 2.5 GM/DL
GLUCOSE SERPL-MCNC: 97 MG/DL (ref 65–99)
HCT VFR BLD AUTO: 47.9 % (ref 37.5–51)
HGB BLD-MCNC: 15.7 G/DL (ref 13–17.7)
IMM GRANULOCYTES # BLD AUTO: 0.02 10*3/MM3 (ref 0–0.05)
IMM GRANULOCYTES NFR BLD AUTO: 0.3 % (ref 0–0.5)
LYMPHOCYTES # BLD AUTO: 1.89 10*3/MM3 (ref 0.7–3.1)
LYMPHOCYTES NFR BLD AUTO: 28.2 % (ref 19.6–45.3)
MAGNESIUM SERPL-MCNC: 2.5 MG/DL (ref 1.6–2.4)
MCH RBC QN AUTO: 29.7 PG (ref 26.6–33)
MCHC RBC AUTO-ENTMCNC: 32.8 G/DL (ref 31.5–35.7)
MCV RBC AUTO: 90.7 FL (ref 79–97)
MONOCYTES # BLD AUTO: 0.64 10*3/MM3 (ref 0.1–0.9)
MONOCYTES NFR BLD AUTO: 9.6 % (ref 5–12)
NEUTROPHILS NFR BLD AUTO: 3.97 10*3/MM3 (ref 1.7–7)
NEUTROPHILS NFR BLD AUTO: 59.3 % (ref 42.7–76)
PHOSPHATE SERPL-MCNC: 3.4 MG/DL (ref 2.5–4.5)
PLATELET # BLD AUTO: 204 10*3/MM3 (ref 140–450)
PMV BLD AUTO: 9 FL (ref 6–12)
POTASSIUM SERPL-SCNC: 4.6 MMOL/L (ref 3.5–5.2)
PROT SERPL-MCNC: 6.6 G/DL (ref 6–8.5)
PSA SERPL-MCNC: 0.06 NG/ML (ref 0–4)
RBC # BLD AUTO: 5.28 10*6/MM3 (ref 4.14–5.8)
SODIUM SERPL-SCNC: 140 MMOL/L (ref 136–145)
WBC NRBC COR # BLD AUTO: 6.7 10*3/MM3 (ref 3.4–10.8)

## 2024-09-25 PROCEDURE — 84153 ASSAY OF PSA TOTAL: CPT

## 2024-09-25 PROCEDURE — 85025 COMPLETE CBC W/AUTO DIFF WBC: CPT

## 2024-09-25 PROCEDURE — 80053 COMPREHEN METABOLIC PANEL: CPT

## 2024-09-25 PROCEDURE — 83735 ASSAY OF MAGNESIUM: CPT

## 2024-09-25 PROCEDURE — 84100 ASSAY OF PHOSPHORUS: CPT

## 2024-09-25 PROCEDURE — 36415 COLL VENOUS BLD VENIPUNCTURE: CPT

## 2024-09-30 ENCOUNTER — HOSPITAL ENCOUNTER (OUTPATIENT)
Dept: ONCOLOGY | Facility: HOSPITAL | Age: 74
Discharge: HOME | End: 2024-09-30
Admitting: INTERNAL MEDICINE
Payer: MEDICARE

## 2024-09-30 ENCOUNTER — SPECIALTY PHARMACY (OUTPATIENT)
Facility: HOSPITAL | Age: 74
End: 2024-09-30
Payer: MEDICARE

## 2024-09-30 ENCOUNTER — OFFICE VISIT (OUTPATIENT)
Dept: ONCOLOGY | Facility: CLINIC | Age: 74
End: 2024-09-30
Payer: MEDICARE

## 2024-09-30 VITALS
TEMPERATURE: 97.3 F | HEIGHT: 68 IN | DIASTOLIC BLOOD PRESSURE: 83 MMHG | OXYGEN SATURATION: 98 % | HEART RATE: 66 BPM | RESPIRATION RATE: 18 BRPM | SYSTOLIC BLOOD PRESSURE: 159 MMHG | BODY MASS INDEX: 32.74 KG/M2 | WEIGHT: 216 LBS

## 2024-09-30 DIAGNOSIS — C61 PROSTATE CANCER: Primary | ICD-10-CM

## 2024-09-30 DIAGNOSIS — Z79.818 ANDROGEN DEPRIVATION THERAPY: ICD-10-CM

## 2024-09-30 DIAGNOSIS — C77.9 ADENOCARCINOMA OF LYMPH NODE: ICD-10-CM

## 2024-09-30 LAB
ALBUMIN SERPL-MCNC: 4 G/DL (ref 3.5–5.2)
ALBUMIN/GLOB SERPL: 1.6 G/DL
ALP SERPL-CCNC: 65 U/L (ref 39–117)
ALT SERPL W P-5'-P-CCNC: 18 U/L (ref 1–41)
ANION GAP SERPL CALCULATED.3IONS-SCNC: 13 MMOL/L (ref 5–15)
AST SERPL-CCNC: 20 U/L (ref 1–40)
BASOPHILS # BLD AUTO: 0.03 10*3/MM3 (ref 0–0.2)
BASOPHILS NFR BLD AUTO: 0.6 % (ref 0–1.5)
BILIRUB SERPL-MCNC: <0.2 MG/DL (ref 0–1.2)
BUN SERPL-MCNC: 19 MG/DL (ref 8–23)
BUN/CREAT SERPL: 23.5 (ref 7–25)
CALCIUM SPEC-SCNC: 9.7 MG/DL (ref 8.6–10.5)
CHLORIDE SERPL-SCNC: 103 MMOL/L (ref 98–107)
CO2 SERPL-SCNC: 22 MMOL/L (ref 22–29)
CREAT BLDA-MCNC: 0.8 MG/DL (ref 0.6–1.3)
CREAT SERPL-MCNC: 0.81 MG/DL (ref 0.76–1.27)
DEPRECATED RDW RBC AUTO: 44.2 FL (ref 37–54)
EGFRCR SERPLBLD CKD-EPI 2021: 91.9 ML/MIN/1.73
EOSINOPHIL # BLD AUTO: 0.12 10*3/MM3 (ref 0–0.4)
EOSINOPHIL NFR BLD AUTO: 2.4 % (ref 0.3–6.2)
ERYTHROCYTE [DISTWIDTH] IN BLOOD BY AUTOMATED COUNT: 13 % (ref 12.3–15.4)
GLOBULIN UR ELPH-MCNC: 2.5 GM/DL
GLUCOSE SERPL-MCNC: 113 MG/DL (ref 65–99)
HCT VFR BLD AUTO: 47.3 % (ref 37.5–51)
HGB BLD-MCNC: 15.4 G/DL (ref 13–17.7)
IMM GRANULOCYTES # BLD AUTO: 0.03 10*3/MM3 (ref 0–0.05)
IMM GRANULOCYTES NFR BLD AUTO: 0.6 % (ref 0–0.5)
LYMPHOCYTES # BLD AUTO: 1.23 10*3/MM3 (ref 0.7–3.1)
LYMPHOCYTES NFR BLD AUTO: 24.8 % (ref 19.6–45.3)
MCH RBC QN AUTO: 29.4 PG (ref 26.6–33)
MCHC RBC AUTO-ENTMCNC: 32.6 G/DL (ref 31.5–35.7)
MCV RBC AUTO: 90.3 FL (ref 79–97)
MONOCYTES # BLD AUTO: 0.39 10*3/MM3 (ref 0.1–0.9)
MONOCYTES NFR BLD AUTO: 7.9 % (ref 5–12)
NEUTROPHILS NFR BLD AUTO: 3.16 10*3/MM3 (ref 1.7–7)
NEUTROPHILS NFR BLD AUTO: 63.7 % (ref 42.7–76)
PLATELET # BLD AUTO: 184 10*3/MM3 (ref 140–450)
PMV BLD AUTO: 9.5 FL (ref 6–12)
POTASSIUM SERPL-SCNC: 4.6 MMOL/L (ref 3.5–5.2)
PROT SERPL-MCNC: 6.5 G/DL (ref 6–8.5)
RBC # BLD AUTO: 5.24 10*6/MM3 (ref 4.14–5.8)
SODIUM SERPL-SCNC: 138 MMOL/L (ref 136–145)
WBC NRBC COR # BLD AUTO: 4.96 10*3/MM3 (ref 3.4–10.8)

## 2024-09-30 PROCEDURE — 25010000002 LEUPROLIDE (6 MONTH) PER 7.5 MG: Performed by: INTERNAL MEDICINE

## 2024-09-30 PROCEDURE — 25010000002 ZOLEDRONIC ACID 4 MG/100ML SOLUTION: Performed by: INTERNAL MEDICINE

## 2024-09-30 PROCEDURE — 82565 ASSAY OF CREATININE: CPT

## 2024-09-30 PROCEDURE — 84153 ASSAY OF PSA TOTAL: CPT | Performed by: NURSE PRACTITIONER

## 2024-09-30 PROCEDURE — 96402 CHEMO HORMON ANTINEOPL SQ/IM: CPT

## 2024-09-30 PROCEDURE — 96374 THER/PROPH/DIAG INJ IV PUSH: CPT

## 2024-09-30 PROCEDURE — 1126F AMNT PAIN NOTED NONE PRSNT: CPT | Performed by: INTERNAL MEDICINE

## 2024-09-30 PROCEDURE — 85025 COMPLETE CBC W/AUTO DIFF WBC: CPT | Performed by: NURSE PRACTITIONER

## 2024-09-30 PROCEDURE — 99215 OFFICE O/P EST HI 40 MIN: CPT | Performed by: INTERNAL MEDICINE

## 2024-09-30 PROCEDURE — 96372 THER/PROPH/DIAG INJ SC/IM: CPT

## 2024-09-30 PROCEDURE — 36416 COLLJ CAPILLARY BLOOD SPEC: CPT

## 2024-09-30 PROCEDURE — 1159F MED LIST DOCD IN RCRD: CPT | Performed by: INTERNAL MEDICINE

## 2024-09-30 PROCEDURE — 80053 COMPREHEN METABOLIC PANEL: CPT | Performed by: NURSE PRACTITIONER

## 2024-09-30 RX ORDER — SODIUM CHLORIDE 9 MG/ML
20 INJECTION, SOLUTION INTRAVENOUS ONCE
Status: CANCELLED | OUTPATIENT
Start: 2024-09-30

## 2024-09-30 RX ORDER — ZOLEDRONIC ACID 0.04 MG/ML
4 INJECTION, SOLUTION INTRAVENOUS ONCE
Status: COMPLETED | OUTPATIENT
Start: 2024-09-30 | End: 2024-09-30

## 2024-09-30 RX ADMIN — LEUPROLIDE ACETATE 45 MG: 45 INJECTION, SUSPENSION, EXTENDED RELEASE SUBCUTANEOUS at 10:56

## 2024-09-30 RX ADMIN — ZOLEDRONIC ACID 4 MG: 0.04 INJECTION, SOLUTION INTRAVENOUS at 10:37

## 2024-09-30 NOTE — PROGRESS NOTES
CHIEF COMPLAINT: No new somatic complaints    Problem List:  Oncology/Hematology History Overview Note   1.  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  Salvage external beam radiation 6400 cGy in 32 fractions ending 11/12/2021.  Stereotactic radiotherapy to 2 focal positive metastatic sites 1 year later.  PSA 8 September 2023.  Lupron started 45 mg every 6 months.  September 2023 PSA still 8.  September 23 PET showed 3 small newly hypermetabolic foci left mid abdomen and pelvis corresponding to normal-sized lymph nodes on CT suspicious for metastatic disease and to previously noted hypermetabolic nodes July 2022 no longer identified.  January 2024 PSA 9.2 3/15/2024 Pylarify PET shows increase in size and uptake of retroperitoneal adenopathy consistent with metastatic disease.  3/18/2024 PSA 11.5.  3/18/2024 follow-up with Dr. Grossman medical oncology.  He recommended Erleada or Xtandi but no chemotherapy.  Plan molecular testing with next generation sequencing and genetic counseling.    -3/25/2024 [facility] oncology follow-up: The patient had seen my partner Dr. Corcroan back in September and subsequently Dr. Otoole and in the meantime had seen Dr. Grossman at [facility].  I reviewed the above recommendations from Dr. Grossman and I confirmed with the patient that those are fine recommendations.  He also has the option of Zytiga prednisone which we discussed and I also discussed in addition the need for Zometa every 6 months to prevent osteoporosis.  He is willing to try what ever I recommend and I would lean towards Erleada.  We discussed the side effects in detail and he will have a cancer treatment preparation visit with our pharmacists.  I would not for the moment go after this with CyberKnife unless he developed refractory symptomatic spots of disease.  I would not do next generation sequencing at  this junction on his tissue from about 10 years ago but would procure tissue when he gets to the hormone refractory phase of his treatment and would send serum as well as tissue NexGen sequencing at that point.  We will make referrals to genetic testing which is standard for all patients with metastatic prostate cancer and may afford targets for therapy in the future as well.  He understands the above plan and is willing to proceed.  He will get his second Lupron shot today.He will see my nurse practitioner monthly with blood count chemistry and PSA and make sure he is tolerating this and in 3 months we will repeat the PSMA PET.  If we are making the progress that we need to see then perhaps we can back off on the frequency of the labs and the visits but I want to make sure he tolerates this well as we do have lateral hormonal blockade options.    -3/25/2024 received Lupron and Zometa  -4/1/2024 chemotherapy education preparation and needs assessment completed    -4/5/2024 began apalutamide    -4/17/2024  Mr. Russo's genetic test was negative for pathogenic mutations in all 49 - cancer risk genes analyzed.  Mr. 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  Per the [employer]etics (ACMG) guidelines, this VUS is not to be used in clinical decision-making, and is to be considered a negative result until further evidence supports reclassification of this variant.     -5/9/2024 PSA 0.188  -5/10/2024 [facility] Oncology clinic follow-up: He is doing well on current therapy with apalutamide, Lupron and Zometa.  Clinically seems to be having an excellent response thus far with a drop in his PSA now down to 0.188.  He is having no significant side effects from his treatment.  He did have some bone pain and fatigue for a few days after Zometa but that resolved.  Physically he is feeling quite well.  He will continue treatment unchanged.  We will see  him back in 4 weeks for follow-up with repeat labs on return.  He is anxious to make a trip back home however I will have him wait and discussed with Dr. Gan plans for ongoing therapy and scans to assess for response prior to any travels.    -5/17/2024 [facility] follow-up: He returns today for logistical discussions.  See my March note for overall plan but the following I think is a reasonable plan.  He will see my note nurse practitioner July 5 for labs including PSA which is going down nicely and at that point she will set him up for a PSMA PET the middle of July.  If that looks good and the PSA is coming down nicely then I think before his Lupron and Zometa the end of September would be fine for him to go home to [facility] with a CBC CMP and PSA towards the end of August in [facility] which he will get the information to us.  He is pleased with how he is feeling on the apalutamide Lupron and Zometa.  He understands the plan for indefinite salvage hormone blockade for hormone sensitive prostate cancer with a HOXB13 VUS.    -6/26/2024 CT chest, abdomen and pelvis shows a few sclerotic foci within thoracic vertebrae which are nonspecific.  These appear unchanged compared to 2021 study which may represent bone islands versus remote sclerotic mets.  Chronic changes within the lung with no suspicious nodules.  Abdomen and pelvis shows previously noted hypermetabolic lymph nodes decreased in size from comparison compatible with a response to therapy.  No evidence of progression or new disease.  Postsurgical changes from prior prostatectomy.  -PSA 0.086    -7/1/2024 [facility] follow-up: Continues to do well on current therapy with apalutamide, Lupron and Zometa.  He is tolerating well with no unusual side effects.  His PSA continues to decline, current PSA 0.086.  CT chest, abdomen and pelvis show good response with previously noted hypermetabolic lymph nodes decreased in size from comparison compatible with  response to therapy.  No evidence of progression or new disease.  He will continue treatment unchanged, he will be due for Lupron and Zometa in late September and we will see him back at that time.    -9/25/2024 PSA 0.057.CBC and CMP unremarkable.    -9/30/2024 [facility] oncology clinic follow-up: PSA doing wonderfully and he feels great.  He will get a PSA and MRI on in December and get back with us and assuming that that is stable or lower then I will get another PSA and a CT and bone scan and March just before his next Lupron and Zometa after today.  He will continue on his apalutamide indefinitely.  Does occasionally have dizziness but it is more vertiginous when he arises and not dizzy in general and is not often.  Otherwise no complaints.     Prostate cancer   2/4/2015 Cancer Staged    Staging form: Prostate, AJCC 8th Edition  - Pathologic stage from 2/4/2015: Stage BENNIE (pT2, pN1, cM0, PSA: 5, Grade Group: 2) - Signed by Niraj Otoole MD on 9/20/2021 9/20/2021 Initial Diagnosis    Prostate cancer (CMS/HCC)     9/30/2021 - 11/12/2021 Radiation    Radiation OncologyTreatment Course:  Jeb Russo received 6400 cGy in 32 fractions to prostate gland via External Beam Radiation - EBRT.     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         HISTORY OF PRESENT ILLNESS:  The patient is a 74 y.o. male, here for follow up on management of stage IV prostate  "cancer    Past Medical History:   Diagnosis Date    Cancer     prostate    Coronary artery disease     Encounter for pacemaker at end of battery life 04/02/2021    Added automatically from request for surgery [id number]    Prostate cancer      Past Surgical History:   Procedure Laterality Date    ANKLE SURGERY      CARDIAC ELECTROPHYSIOLOGY PROCEDURE N/A 04/05/2021    Procedure: PPM generator change - dual  [employer];  Surgeon: Valentina Saini MD;  Location:  [facility];  Service: Cardiology;  Laterality: N/A;    CARDIAC ELECTROPHYSIOLOGY PROCEDURE N/A 11/01/2021    Procedure: Pocket Revision to submuscular, no meds to hold;  Surgeon: Anant Jaramillo MD;  Location:  [facility];  Service: Cardiology;  Laterality: N/A;    COLONOSCOPY      CYBERKNIFE  08/12/2022    Metastatic left iliac LN, left periaortic LN    INSERT / REPLACE / REMOVE PACEMAKER  2009    KNEE ACL RECONSTRUCTION      KNEE ACL RECONSTRUCTION      PROSTATE SURGERY  2015       No Known Allergies    Family History and Social History reviewed and changed as necessary    REVIEW OF SYSTEM:   Occasional vertigo when he arises but not often.  No palpitations or chest pains and no focal motor or sensory issues that he is detected    PHYSICAL EXAM:  No jaundice icterus or pallor.  No rashes.  No respiratory distress.    Vitals:    09/30/24 0844   BP: 159/83   Pulse: 66   Resp: 18   Temp: 97.3 °F (36.3 °C)   SpO2: 98%   Weight: 98 kg (216 lb)   Height: 171.5 cm (67.5\")     Vitals:    09/30/24 0844   PainSc: 0-No pain          ECOG score: 1           Vitals reviewed.    ECOG: (1) Restricted in Physically Strenuous Activity, Ambulatory & Able to Do Work of Light Nature    Lab Results   Component Value Date    HGB 15.7 09/25/2024    HCT 47.9 09/25/2024    MCV 90.7 09/25/2024     09/25/2024    WBC 6.70 09/25/2024    NEUTROABS 3.97 09/25/2024    LYMPHSABS 1.89 09/25/2024    MONOSABS 0.64 09/25/2024    EOSABS 0.15 " 09/25/2024    BASOSABS 0.03 09/25/2024       Lab Results   Component Value Date    GLUCOSE 97 09/25/2024    BUN 17 09/25/2024    CREATININE 1.06 09/25/2024     09/25/2024    K 4.6 09/25/2024     09/25/2024    CO2 27.0 09/25/2024    CALCIUM 10.5 09/25/2024    PROTEINTOT 6.6 09/25/2024    ALBUMIN 4.1 09/25/2024    BILITOT 0.2 09/25/2024    ALKPHOS 68 09/25/2024    AST 23 09/25/2024    ALT 21 09/25/2024             ASSESSMENT & PLAN:  1.  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  Salvage external beam radiation 6400 cGy in 32 fractions ending 11/12/2021.  Stereotactic radiotherapy to 2 focal positive metastatic sites 1 year later.  PSA 8 September 2023.  Lupron started 45 mg every 6 months.  September 2023 PSA still 8.  September 23 PET showed 3 small newly hypermetabolic foci left mid abdomen and pelvis corresponding to normal-sized lymph nodes on CT suspicious for metastatic disease and to previously noted hypermetabolic nodes July 2022 no longer identified.  January 2024 PSA 9.2 3/15/2024 Pylarify PET shows increase in size and uptake of retroperitoneal adenopathy consistent with metastatic disease.  3/18/2024 PSA 11.5.  3/18/2024 follow-up with Dr. Grossman medical oncology.  He recommended Erleada or Xtandi but no chemotherapy.  Plan molecular testing with next generation sequencing and genetic counseling.    -3/25/2024 [facility] medical oncology follow-up: The patient had seen my partner Dr. Corcoran back in September and subsequently Dr. Otoole and in the meantime had seen Dr. Grossman at [facility].  I reviewed the above recommendations from Dr. Grossman and I confirmed with the patient that those are fine recommendations.  He also has the option of Zytiga prednisone which we discussed and I also discussed in addition the need for Zometa every 6 months to prevent osteoporosis.  He is willing to  try what ever I recommend and I would lean towards Erleada.  We discussed the side effects in detail and he will have a cancer treatment preparation visit with our pharmacists.  I would not for the moment go after this with CyberKnife unless he developed refractory symptomatic spots of disease.  I would not do next generation sequencing at this junction on his tissue from about 10 years ago but would procure tissue when he gets to the hormone refractory phase of his treatment and would send serum as well as tissue NexGen sequencing at that point.  We will make referrals to genetic testing which is standard for all patients with metastatic prostate cancer and may afford targets for therapy in the future as well.  He understands the above plan and is willing to proceed.  He will get his second Lupron shot today.He will see my nurse practitioner monthly with blood count chemistry and PSA and make sure he is tolerating this and in 3 months we will repeat the PSMA PET.  If we are making the progress that we need to see then perhaps we can back off on the frequency of the labs and the visits but I want to make sure he tolerates this well as we do have lateral hormonal blockade options.    -3/25/2024 received Lupron and Zometa  -4/1/2024 chemotherapy education preparation and needs assessment completed    -4/5/2024 began apalutamide    -4/17/2024  Mr. Russo's genetic test was negative for pathogenic mutations in all 49 - cancer risk genes analyzed.  Mr. 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  Per the American College [employer] Medical Genetics (ACMG) guidelines, this VUS is not to be used in clinical decision-making, and is to be considered a negative result until further evidence supports reclassification of this variant.     -5/9/2024 PSA 0.188  -5/10/2024 [facility] follow-[facility]: He is doing well on current therapy with apalutamide, Lupron and Zometa.   Clinically seems to be having an excellent response thus far with a drop in his PSA now down to 0.188.  He is having no significant side effects from his treatment.  He did have some bone pain and fatigue for a few days after Zometa but that resolved.  Physically he is feeling quite well.  He will continue treatment unchanged.  We will see him back in 4 weeks for follow-up with repeat labs on return.  He is anxious to make a trip back home however I will have him wait and discussed with Dr. Gan plans for ongoing therapy and scans to assess for response prior to any travels.    -5/17/2024 [facility] follow-up: He returns today for logistical discussions.  See my March note for overall plan but the following I think is a reasonable plan.  He will see my note nurse practitioner July 5 for labs including PSA which is going down nicely and at that point she will set him up for a PSMA PET the middle of July.  If that looks good and the PSA is coming down nicely then I think before his Lupron and Zometa the end of September would be fine for him to go home to [facility] with a CBC CMP and PSA towards the end of August in [facility] which he will get the information to us.  He is pleased with how he is feeling on the apalutamide Lupron and Zometa.  He understands the plan for indefinite salvage hormone blockade for hormone sensitive prostate cancer with a HOXB13 VUS.    -6/26/2024 CT chest, abdomen and pelvis shows a few sclerotic foci within thoracic vertebrae which are nonspecific.  These appear unchanged compared to 2021 study which may represent bone islands versus remote sclerotic mets.  Chronic changes within the lung with no suspicious nodules.  Abdomen and pelvis shows previously noted hypermetabolic lymph nodes decreased in size from comparison compatible with a response to therapy.  No evidence of progression or new disease.  Postsurgical changes from prior prostatectomy.  -PSA 0.086    -7/1/2024 [facility]  clinic follow-up: Continues to do well on current therapy with apalutamide, Lupron and Zometa.  He is tolerating well with no unusual side effects.  His PSA continues to decline, current PSA 0.086.  CT chest, abdomen and pelvis show good response with previously noted hypermetabolic lymph nodes decreased in size from comparison compatible with response to therapy.  No evidence of progression or new disease.  He will continue treatment unchanged, he will be due for Lupron and Zometa in late September and we will see him back at that time.    -9/25/2024 PSA 0.057.CBC and CMP unremarkable.    -9/30/2024 [facility] oncology clinic follow-up: PSA doing wonderfully and he feels great.  He will get a PSA and MRI on in December and get back with us and assuming that that is stable or lower then I will get another PSA and a CT and bone scan and March just before his next Lupron and Zometa after today.  He will continue on his apalutamide indefinitely.  Does occasionally have dizziness but it is more vertiginous when he arises and not dizzy in general and is not often.  Otherwise no complaints.    Total time of care today inclusive of time spent today prior to patient's arrival reviewing interval data and during visit interviewing him as to signs or symptoms of his disease and management thereof and after visit instituting this plan took 50 minutes patient care time throughout the day today.  Bright Gan MD    09/30/2024

## 2024-09-30 NOTE — LETTER
September 30, 2024       No Recipients    Patient: Jeb Russo   YOB: 1950   Date of Visit: 9/30/2024     Dear Jacqueline Torres PA-C:       Thank you for referring Jeb Russo to me for evaluation. Below are the relevant portions of my assessment and plan of care.    If you have questions, please do not hesitate to call me. I look forward to following Jeb along with you.         Sincerely,        Bright aGn MD        CC:   No Recipients    Bright Gan MD  09/30/24 0928  Sign when Signing Visit  CHIEF COMPLAINT: No new somatic complaints    Problem List:  Oncology/Hematology History Overview Note   1.  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  Salvage external beam radiation 6400 cGy in 32 fractions ending 11/12/2021.  Stereotactic radiotherapy to 2 focal positive metastatic sites 1 year later.  PSA 8 September 2023.  Lupron started 45 mg every 6 months.  September 2023 PSA still 8.  September 23 PET showed 3 small newly hypermetabolic foci left mid abdomen and pelvis corresponding to normal-sized lymph nodes on CT suspicious for metastatic disease and to previously noted hypermetabolic nodes July 2022 no longer identified.  January 2024 PSA 9.2 3/15/2024 Pylarify PET shows increase in size and uptake of retroperitoneal adenopathy consistent with metastatic disease.  3/18/2024 PSA 11.5.  3/18/2024 follow-up with Dr. Grossman medical oncology.  He recommended Erleada or Xtandi but no chemotherapy.  Plan molecular testing with next generation sequencing and genetic counseling.    -3/25/2024 [facility] medical oncology follow-up: The patient had seen my partner Dr. Corcoran back in September and subsequently Dr. Otoole and in the meantime had seen Dr. Grossman at [facility].  I reviewed the above recommendations from Dr. Grossman and I confirmed with the patient that those are fine  recommendations.  He also has the option of Zytiga prednisone which we discussed and I also discussed in addition the need for Zometa every 6 months to prevent osteoporosis.  He is willing to try what ever I recommend and I would lean towards Erleada.  We discussed the side effects in detail and he will have a cancer treatment preparation visit with our pharmacists.  I would not for the moment go after this with CyberKnife unless he developed refractory symptomatic spots of disease.  I would not do next generation sequencing at this junction on his tissue from about 10 years ago but would procure tissue when he gets to the hormone refractory phase of his treatment and would send serum as well as tissue NexGen sequencing at that point.  We will make referrals to genetic testing which is standard for all patients with metastatic prostate cancer and may afford targets for therapy in the future as well.  He understands the above plan and is willing to proceed.  He will get his second Lupron shot today.He will see my nurse practitioner monthly with blood count chemistry and PSA and make sure he is tolerating this and in 3 months we will repeat the PSMA PET.  If we are making the progress that we need to see then perhaps we can back off on the frequency of the labs and the visits but I want to make sure he tolerates this well as we do have lateral hormonal blockade options.    -3/25/2024 received Lupron and Zometa  -4/1/2024 chemotherapy education preparation and needs assessment completed    -4/5/2024 began apalutamide    -4/17/2024  Mr. Russo's genetic test was negative for pathogenic mutations in all 49 - cancer risk genes analyzed.  Mr. 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  Per the [employer] College [employer]etics (ACMG) guidelines, this VUS is not to be used in clinical decision-making, and is to be considered a negative result until further  evidence supports reclassification of this variant.     -5/9/2024 PSA 0.188  -5/10/2024 [facility] follow-up: He is doing well on current therapy with apalutamide, Lupron and Zometa.  Clinically seems to be having an excellent response thus far with a drop in his PSA now down to 0.188.  He is having no significant side effects from his treatment.  He did have some bone pain and fatigue for a few days after Zometa but that resolved.  Physically he is feeling quite well.  He will continue treatment unchanged.  We will see him back in 4 weeks for follow-up with repeat labs on return.  He is anxious to make a trip back home however I will have him wait and discussed with Dr. Gan plans for ongoing therapy and scans to assess for response prior to any travels.    -5/17/2024 [facility] follow-up: He returns today for logistical discussions.  See my March note for overall plan but the following I think is a reasonable plan.  He will see my note nurse practitioner July 5 for labs including PSA which is going down nicely and at that point she will set him up for a PSMA PET the middle of July.  If that looks good and the PSA is coming down nicely then I think before his Lupron and Zometa the end of September would be fine for him to go home to [facility] with a CBC CMP and PSA towards the end of August in [facility] which he will get the information to us.  He is pleased with how he is feeling on the apalutamide Lupron and Zometa.  He understands the plan for indefinite salvage hormone blockade for hormone sensitive prostate cancer with a HOXB13 VUS.    -6/26/2024 CT chest, abdomen and pelvis shows a few sclerotic foci within thoracic vertebrae which are nonspecific.  These appear unchanged compared to 2021 study which may represent bone islands versus remote sclerotic mets.  Chronic changes within the lung with no suspicious nodules.  Abdomen and pelvis shows previously noted hypermetabolic lymph nodes decreased in  size from comparison compatible with a response to therapy.  No evidence of progression or new disease.  Postsurgical changes from prior prostatectomy.  -PSA 0.086    -7/1/2024 [facility] follow[facility]: Continues to do well on current therapy with apalutamide, Lupron and Zometa.  He is tolerating well with no unusual side effects.  His PSA continues to decline, current PSA 0.086.  CT chest, abdomen and pelvis show good response with previously noted hypermetabolic lymph nodes decreased in size from comparison compatible with response to therapy.  No evidence of progression or new disease.  He will continue treatment unchanged, he will be due for Lupron and Zometa in late September and we will see him back at that time.    -9/25/2024 PSA 0.057.CBC and CMP unremarkable.    -9/30/2024 [facility] follow-up: PSA doing wonderfully and he feels great.  He will get a PSA and MRI on in December and get back with us and assuming that that is stable or lower then I will get another PSA and a CT and bone scan and March just before his next Lupron and Zometa after today.  He will continue on his apalutamide indefinitely.  Does occasionally have dizziness but it is more vertiginous when he arises and not dizzy in general and is not often.  Otherwise no complaints.     Prostate cancer   2/4/2015 Cancer Staged    Staging form: Prostate, AJCC 8th Edition  - Pathologic stage from 2/4/2015: Stage BENNIE (pT2, pN1, cM0, PSA: 5, Grade Group: 2) - Signed by Niraj Otoole MD on 9/20/2021 9/20/2021 Initial Diagnosis    Prostate cancer (CMS/[facility])     9/30/2021 - 11/12/2021 Radiation    Radiation OncologyTreatment Course:  Jeb Russo received 6400 cGy in 32 fractions to prostate gland via External Beam Radiation - EBRT.     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         HISTORY OF PRESENT ILLNESS:  The patient is a 74 y.o. male, here for follow up on management of stage IV prostate cancer    Past Medical History:   Diagnosis Date   • Cancer     prostate   • Coronary artery disease    • Encounter for pacemaker at end of battery life 04/02/2021    Added automatically from request for surgery [id number]   • Prostate cancer      Past Surgical History:   Procedure Laterality Date   • ANKLE SURGERY     • CARDIAC ELECTROPHYSIOLOGY PROCEDURE N/A 04/05/2021    Procedure: PPM generator change - dual  [employer];  Surgeon: Valentina Saini MD;  Location:  [facility];  Service: Cardiology;  Laterality: N/A;   • CARDIAC ELECTROPHYSIOLOGY PROCEDURE N/A 11/01/2021    Procedure: Pocket Revision to submuscular, no meds to hold;  Surgeon: Anant Jaramillo MD;  Location: [facility];  Service: Cardiology;  Laterality: N/A;   • COLONOSCOPY     • CYBERKNIFE  08/12/2022    Metastatic left iliac LN, left periaortic LN   • INSERT / REPLACE / REMOVE PACEMAKER  2009   • KNEE ACL RECONSTRUCTION     • KNEE ACL RECONSTRUCTION     • PROSTATE SURGERY  2015       No Known Allergies    Family History and Social History reviewed and changed as necessary    REVIEW OF SYSTEM:   Occasional vertigo when he arises but not often.  No palpitations or chest pains and no focal motor or sensory issues that he is detected    PHYSICAL EXAM:  No jaundice icterus or pallor.  No rashes.  No respiratory distress.    Vitals:    09/30/24 0844   BP: 159/83   Pulse: 66   Resp: 18  "  Temp: 97.3 °F (36.3 °C)   SpO2: 98%   Weight: 98 kg (216 lb)   Height: 171.5 cm (67.5\")     Vitals:    09/30/24 0844   PainSc: 0-No pain          ECOG score: 1           Vitals reviewed.    ECOG: (1) Restricted in Physically Strenuous Activity, Ambulatory & Able to Do Work of Light Nature    Lab Results   Component Value Date    HGB 15.7 09/25/2024    HCT 47.9 09/25/2024    MCV 90.7 09/25/2024     09/25/2024    WBC 6.70 09/25/2024    NEUTROABS 3.97 09/25/2024    LYMPHSABS 1.89 09/25/2024    MONOSABS 0.64 09/25/2024    EOSABS 0.15 09/25/2024    BASOSABS 0.03 09/25/2024       Lab Results   Component Value Date    GLUCOSE 97 09/25/2024    BUN 17 09/25/2024    CREATININE 1.06 09/25/2024     09/25/2024    K 4.6 09/25/2024     09/25/2024    CO2 27.0 09/25/2024    CALCIUM 10.5 09/25/2024    PROTEINTOT 6.6 09/25/2024    ALBUMIN 4.1 09/25/2024    BILITOT 0.2 09/25/2024    ALKPHOS 68 09/25/2024    AST 23 09/25/2024    ALT 21 09/25/2024             ASSESSMENT & PLAN:  1.  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  Salvage external beam radiation 6400 cGy in 32 fractions ending 11/12/2021.  Stereotactic radiotherapy to 2 focal positive metastatic sites 1 year later.  PSA 8 September 2023.  Lupron started 45 mg every 6 months.  September 2023 PSA still 8.  September 23 PET showed 3 small newly hypermetabolic foci left mid abdomen and pelvis corresponding to normal-sized lymph nodes on CT suspicious for metastatic disease and to previously noted hypermetabolic nodes July 2022 no longer identified.  January 2024 PSA 9.2 3/15/2024 Pylarify PET shows increase in size and uptake of retroperitoneal adenopathy consistent with metastatic disease.  3/18/2024 PSA 11.5.  3/18/2024 follow-up with Dr. Grossman medical oncology.  He recommended Erleada or Xtandi but no chemotherapy.  Plan molecular testing with " next generation sequencing and genetic counseling.    -3/25/2024 [facility] medical oncology follow-up: The patient had seen my partner Dr. Corcoran back in September and subsequently Dr. Otoole and in the meantime had seen Dr. Grossman at [facility].  I reviewed the above recommendations from Dr. Grossman and I confirmed with the patient that those are fine recommendations.  He also has the option of Zytiga prednisone which we discussed and I also discussed in addition the need for Zometa every 6 months to prevent osteoporosis.  He is willing to try what ever I recommend and I would lean towards Erleada.  We discussed the side effects in detail and he will have a cancer treatment preparation visit with our pharmacists.  I would not for the moment go after this with CyberKnife unless he developed refractory symptomatic spots of disease.  I would not do next generation sequencing at this junction on his tissue from about 10 years ago but would procure tissue when he gets to the hormone refractory phase of his treatment and would send serum as well as tissue NexGen sequencing at that point.  We will make referrals to genetic testing which is standard for all patients with metastatic prostate cancer and may afford targets for therapy in the future as well.  He understands the above plan and is willing to proceed.  He will get his second Lupron shot today.He will see my nurse practitioner monthly with blood count chemistry and PSA and make sure he is tolerating this and in 3 months we will repeat the PSMA PET.  If we are making the progress that we need to see then perhaps we can back off on the frequency of the labs and the visits but I want to make sure he tolerates this well as we do have lateral hormonal blockade options.    -3/25/2024 received Lupron and Zometa  -4/1/2024 chemotherapy education preparation and needs assessment completed    -4/5/2024 began apalutamide    -4/17/2024  Mr. Russo's genetic test was negative  for pathogenic mutations in all 49 - cancer risk genes analyzed.  Mr. 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  Per the American College of Medical Genetics (ACMG) guidelines, this VUS is not to be used in clinical decision-making, and is to be considered a negative result until further evidence supports reclassification of this variant.     -5/9/2024 PSA 0.188  -5/10/2024 [facility] follow-up: He is doing well on current therapy with apalutamide, Lupron and Zometa.  Clinically seems to be having an excellent response thus far with a drop in his PSA now down to 0.188.  He is having no significant side effects from his treatment.  He did have some bone pain and fatigue for a few days after Zometa but that resolved.  Physically he is feeling quite well.  He will continue treatment unchanged.  We will see him back in 4 weeks for follow-up with repeat labs on return.  He is anxious to make a trip back home however I will have him wait and discussed with Dr. Gan plans for ongoing therapy and scans to assess for response prior to any travels.    -5/17/2024 [facility] follow-[facility]: He returns today for logistical discussions.  See my March note for overall plan but the following I think is a reasonable plan.  He will see my note nurse practitioner July 5 for labs including PSA which is going down nicely and at that point she will set him up for a PSMA PET the middle of July.  If that looks good and the PSA is coming down nicely then I think before his Lupron and Zometa the end of September would be fine for him to go home to [facility] with a CBC CMP and PSA towards the end of August in [facility] which he will get the information to us.  He is pleased with how he is feeling on the apalutamide Lupron and Zometa.  He understands the plan for indefinite salvage hormone blockade for hormone sensitive prostate cancer with a HOXB13 VUS.    -6/26/2024 CT chest,  abdomen and pelvis shows a few sclerotic foci within thoracic vertebrae which are nonspecific.  These appear unchanged compared to 2021 study which may represent bone islands versus remote sclerotic mets.  Chronic changes within the lung with no suspicious nodules.  Abdomen and pelvis shows previously noted hypermetabolic lymph nodes decreased in size from comparison compatible with a response to therapy.  No evidence of progression or new disease.  Postsurgical changes from prior prostatectomy.  -PSA 0.086    -7/1/2024 [facility] clinic follow-up: Continues to do well on current therapy with apalutamide, Lupron and Zometa.  He is tolerating well with no unusual side effects.  His PSA continues to decline, current PSA 0.086.  CT chest, abdomen and pelvis show good response with previously noted hypermetabolic lymph nodes decreased in size from comparison compatible with response to therapy.  No evidence of progression or new disease.  He will continue treatment unchanged, he will be due for Lupron and Zometa in late September and we will see him back at that time.    -9/25/2024 PSA 0.057.CBC and CMP unremarkable.    -9/30/2024 [facility] oncology clinic follow-up: PSA doing wonderfully and he feels great.  He will get a PSA and MRI on in December and get back with us and assuming that that is stable or lower then I will get another PSA and a CT and bone scan and March just before his next Lupron and Zometa after today.  He will continue on his apalutamide indefinitely.  Does occasionally have dizziness but it is more vertiginous when he arises and not dizzy in general and is not often.  Otherwise no complaints.    Total time of care today inclusive of time spent today prior to patient's arrival reviewing interval data and during visit interviewing him as to signs or symptoms of his disease and management thereof and after visit instituting this plan took 50 minutes patient care time throughout the day today.  Bright  DIANE Gan MD    09/30/2024

## 2024-10-01 LAB — PSA SERPL-MCNC: 0.04 NG/ML (ref 0–4)

## 2024-12-13 DIAGNOSIS — C61 PROSTATE CANCER: ICD-10-CM

## 2024-12-13 RX ORDER — APALUTAMIDE 240 MG/1
TABLET, FILM COATED ORAL
Qty: 30 TABLET | Refills: 11 | Status: SHIPPED | OUTPATIENT
Start: 2024-12-13

## 2025-03-26 ENCOUNTER — HOSPITAL ENCOUNTER (OUTPATIENT)
Dept: NUCLEAR MEDICINE | Facility: HOSPITAL | Age: 75
Discharge: HOME | End: 2025-03-26
Payer: MEDICARE

## 2025-03-26 ENCOUNTER — HOSPITAL ENCOUNTER (OUTPATIENT)
Dept: CT IMAGING | Facility: HOSPITAL | Age: 75
Discharge: HOME | End: 2025-03-26
Admitting: INTERNAL MEDICINE
Payer: MEDICARE

## 2025-03-26 DIAGNOSIS — C61 PROSTATE CANCER: ICD-10-CM

## 2025-03-26 DIAGNOSIS — C77.9 ADENOCARCINOMA OF LYMPH NODE: ICD-10-CM

## 2025-03-26 PROCEDURE — 74177 CT ABD & PELVIS W/CONTRAST: CPT

## 2025-03-26 PROCEDURE — 71260 CT THORAX DX C+: CPT

## 2025-03-26 PROCEDURE — 25510000001 IOPAMIDOL 61 % SOLUTION: Performed by: INTERNAL MEDICINE

## 2025-03-26 PROCEDURE — A9503 TC99M MEDRONATE: HCPCS | Performed by: INTERNAL MEDICINE

## 2025-03-26 PROCEDURE — 78306 BONE IMAGING WHOLE BODY: CPT

## 2025-03-26 PROCEDURE — 34310000005 TECHNETIUM MEDRONATE KIT: Performed by: INTERNAL MEDICINE

## 2025-03-26 RX ORDER — IOPAMIDOL 612 MG/ML
100 INJECTION, SOLUTION INTRAVASCULAR
Status: COMPLETED | OUTPATIENT
Start: 2025-03-26 | End: 2025-03-26

## 2025-03-26 RX ORDER — TC 99M MEDRONATE 20 MG/10ML
27.6 INJECTION, POWDER, LYOPHILIZED, FOR SOLUTION INTRAVENOUS
Status: COMPLETED | OUTPATIENT
Start: 2025-03-26 | End: 2025-03-26

## 2025-03-26 RX ADMIN — TC 99M MEDRONATE 27.6 MILLICURIE: 20 INJECTION, POWDER, LYOPHILIZED, FOR SOLUTION INTRAVENOUS at 09:20

## 2025-03-26 RX ADMIN — IOPAMIDOL 90 ML: 612 INJECTION, SOLUTION INTRAVENOUS at 10:51

## 2025-03-27 ENCOUNTER — SPECIALTY PHARMACY (OUTPATIENT)
Dept: ONCOLOGY | Facility: HOSPITAL | Age: 75
End: 2025-03-27
Payer: MEDICARE

## 2025-03-27 NOTE — PROGRESS NOTES
Specialty Pharmacy Patient Management Program  Oncology 6-Month Clinical Assessment       Jeb Russo is a 74 y.o. male with prostate cancer seen today to assess adherence and side effects.    Reason for Outreach: Routine medication check-in .    Regimen: Apalutamide (Erleada) 240mg tablet by mouth once daily with or without food.    Specialty Pharmacy Goal   Goals Addressed Today        Specialty Pharmacy General Goal      Clinical goal/therapeutic target: stable or decreasing PSA and disease control, per the recent oncology clinic notes and labs.   3/27/25: I have reviewed the most recent clinic note and labs. Dr. Gan recommends continuing Apalutamide at this time. The patient is tolerating the medication and is currently on track for goal. Medication is preventing disease progression as evidenced by imaging or provider/clinic documentation.    PSA   Date Value Ref Range Status   03/14/2024 10.300 (H) 0.000 - 4.000 ng/mL Final   09/25/2023 7.730 (H) 0.000 - 4.000 ng/mL Final   09/11/2023 8.110 (H) 0.000 - 4.000 ng/mL Final   04/13/2021 2.080 0.000 - 4.000 ng/mL Final   03/31/2021 1.960 0.000 - 4.000 ng/mL Final   05/29/2019 0.221 0.000 - 4.000 ng/mL Final   11/22/2017 0.100 0.000 - 4.000 ng/mL Final      Latest Reference Range & Units 05/09/24 12:16 06/07/24 12:04 06/27/24 10:08 09/25/24 10:59 09/30/24 10:21   PSA 0.000 - 4.000 ng/mL 0.188 0.151 0.086 0.057 0.045               Problem List   Problem list reviewed by Aleah Fink, PharmD on 3/27/2025 at 11:29 AM  Patient Active Problem List   Diagnosis Code    Sick sinus syndrome I49.5    Hyperlipidemia LDL goal <100 E78.5    Hypercholesterolemia E78.00    History of prostate cancer Z85.46    Prostate cancer C61    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   Patient can self administer medications: yes  Medication Follow-Up Plan: Next clinical assessment  Lab Review: The labs listed below have been reviewed. No dose adjustments are needed for the oral specialty medication(s) based on the labs.    Lab Results   Component Value Date    GLUCOSE 113 (H) 09/30/2024    CALCIUM 9.7 09/30/2024     09/30/2024    K 4.6 09/30/2024    CO2 22.0 09/30/2024     09/30/2024    BUN 19 09/30/2024    CREATININE 0.81 09/30/2024    EGFRIFNONA 75 11/01/2021    BCR 23.5 09/30/2024    ANIONGAP 13.0 09/30/2024     Lab Results   Component Value Date    WBC 4.96 09/30/2024    RBC 5.24 09/30/2024    HGB 15.4 09/30/2024    HCT 47.3 09/30/2024    MCV 90.3 09/30/2024    MCH 29.4 09/30/2024    MCHC 32.6 09/30/2024    RDW 13.0 09/30/2024    RDWSD 44.2 09/30/2024    MPV 9.5 09/30/2024     09/30/2024    NEUTRORELPCT 63.7 09/30/2024    LYMPHORELPCT 24.8 09/30/2024    MONORELPCT 7.9 09/30/2024    EOSRELPCT 2.4 09/30/2024    BASORELPCT 0.6 09/30/2024    AUTOIGPER 0.6 (H) 09/30/2024    NEUTROABS 3.16 09/30/2024    LYMPHSABS 1.23 09/30/2024    MONOSABS 0.39 09/30/2024    EOSABS 0.12 09/30/2024    BASOSABS 0.03 09/30/2024    AUTOIGNUM 0.03 09/30/2024    [facility] 0.0 05/29/2019     Drug-drug interactions  Completed medication reconciliation today to assess for drug interactions. Patient denies starting or stopping any medications.    Assessed medication list for interactions, no significant drug interactions noted.   Advised patient to  call the clinic if any new medications are started so we can assess for drug-drug interactions.  Drug-food interactions discussed  Vaccines are coordinated by the patient's oncologist and primary care provider.    Medications   Medicines reviewed by Aleah Fink, PharmD on 3/27/2025 at 11:29 AM  Prior to Admission medications    Medication Sig Start Date End Date Taking? Authorizing Provider   Apalutamide (Erleada) 240 MG tablet TAKE 1 TABLET BY MOUTH DAILY  WITH OR WITHOUT FOOD 12/13/24   Bright Gan MD   B Complex Vitamins (VITAMIN B COMPLEX PO) Take  by mouth Every Other Day.    Lory Roberto MD   Calcium Citrate-Vitamin D (Calcium + D) 315-5 MG-MCG per tablet Take 1 tablet by mouth Daily. 4/4/24   Lory Roberto MD   COLLAGEN PO Take  by mouth.    Lory Roberto MD   Multiple Vitamins-Minerals (MULTIVITAMIN ADULT PO) Take 15 mg by mouth Daily.    Lory Roberto MD   ondansetron (ZOFRAN) 8 MG tablet Take 1 tablet by mouth 3 (Three) Times a Day As Needed for Nausea or Vomiting. 3/25/24   Bright Gan MD   vitamin D3 125 MCG (5000 UT) capsule capsule Take 1 capsule by mouth Daily.    Lory Roberto MD   Zinc 30 MG tablet Take 1 tablet by mouth Daily.    Lory Roberto MD       Allergies  Known allergies and reactions were discussed with the patient. The patient's chart has been reviewed for allergy information and updated as necessary.   No Known Allergies      Allergies reviewed by Aleah Fink, PharmD on 3/27/2025 at 11:29 AM    Hospitalizations and Urgent Care Visits Since Last Assessment:  Unplanned hospitalizations or inpatient admissions: 0  ED Visits: 0  Urgent Office Visits: 0    Adherence Assessment:  Adherence Questions  Linked Medication(s) Assessed: Apalutamide (Erleada)  On average, how many doses/injections does the patient miss per month?: 1  What are the identified reasons for non-adherence or missed doses? : no problems identified  What is the  estimated medication adherence level?: %  Based on the patient/caregiver response and refill history, does this patient require an MTP to track adherence improvements?: no    Quality of Life Assessment:  Quality of Life Assessment  Quality of Life Improvement Scale: 10-Significantly better    Financial Assessment:  Medication availability/affordability: Patient has had no issues obtaining medication from pharmacy.    Attestation:  I attest the patient was actively involved in and has agreed to the above plan of care. If the prescribed therapy is at any point deemed not appropriate based on the current or future assessments, a consultation will be initiated with the patient's specialty care provider to determine the best course of action. The revised plan of therapy will be documented along with any required assessments and/or additional patient education provided.       All questions addressed and patient had no additional concerns. Patient has pharmacy contact information.    Aleah Fink, PharmD, [facility]  Clinic Specialty Oncology Pharmacist  Phone [phone]      3/27/2025  11:45 EDT

## 2025-03-28 ENCOUNTER — LAB (OUTPATIENT)
Dept: LAB | Facility: HOSPITAL | Age: 75
End: 2025-03-28
Payer: MEDICARE

## 2025-03-28 DIAGNOSIS — C61 PROSTATE CANCER: ICD-10-CM

## 2025-03-28 DIAGNOSIS — IMO0001 ANDROGEN DEPRIVATION THERAPY: ICD-10-CM

## 2025-03-28 DIAGNOSIS — C77.9 ADENOCARCINOMA OF LYMPH NODE: ICD-10-CM

## 2025-03-28 LAB
ALBUMIN SERPL-MCNC: 3.9 G/DL (ref 3.5–5.2)
ALBUMIN/GLOB SERPL: 1.5 G/DL
ALP SERPL-CCNC: 70 U/L (ref 39–117)
ALT SERPL W P-5'-P-CCNC: 16 U/L (ref 1–41)
ANION GAP SERPL CALCULATED.3IONS-SCNC: 8 MMOL/L (ref 5–15)
AST SERPL-CCNC: 20 U/L (ref 1–40)
BASOPHILS # BLD AUTO: 0.03 10*3/MM3 (ref 0–0.2)
BASOPHILS NFR BLD AUTO: 0.5 % (ref 0–1.5)
BILIRUB SERPL-MCNC: 0.2 MG/DL (ref 0–1.2)
BUN SERPL-MCNC: 11 MG/DL (ref 8–23)
BUN/CREAT SERPL: 12.6 (ref 7–25)
CALCIUM SPEC-SCNC: 9.8 MG/DL (ref 8.6–10.5)
CHLORIDE SERPL-SCNC: 107 MMOL/L (ref 98–107)
CO2 SERPL-SCNC: 26 MMOL/L (ref 22–29)
CREAT SERPL-MCNC: 0.87 MG/DL (ref 0.76–1.27)
DEPRECATED RDW RBC AUTO: 43.5 FL (ref 37–54)
EGFRCR SERPLBLD CKD-EPI 2021: 90 ML/MIN/1.73
EOSINOPHIL # BLD AUTO: 0.16 10*3/MM3 (ref 0–0.4)
EOSINOPHIL NFR BLD AUTO: 2.7 % (ref 0.3–6.2)
ERYTHROCYTE [DISTWIDTH] IN BLOOD BY AUTOMATED COUNT: 13 % (ref 12.3–15.4)
GLOBULIN UR ELPH-MCNC: 2.6 GM/DL
GLUCOSE SERPL-MCNC: 106 MG/DL (ref 65–99)
HCT VFR BLD AUTO: 45.8 % (ref 37.5–51)
HGB BLD-MCNC: 14.9 G/DL (ref 13–17.7)
IMM GRANULOCYTES # BLD AUTO: 0.04 10*3/MM3 (ref 0–0.05)
IMM GRANULOCYTES NFR BLD AUTO: 0.7 % (ref 0–0.5)
LYMPHOCYTES # BLD AUTO: 1.56 10*3/MM3 (ref 0.7–3.1)
LYMPHOCYTES NFR BLD AUTO: 26.3 % (ref 19.6–45.3)
MAGNESIUM SERPL-MCNC: 2.3 MG/DL (ref 1.6–2.4)
MCH RBC QN AUTO: 29.6 PG (ref 26.6–33)
MCHC RBC AUTO-ENTMCNC: 32.5 G/DL (ref 31.5–35.7)
MCV RBC AUTO: 91.1 FL (ref 79–97)
MONOCYTES # BLD AUTO: 0.53 10*3/MM3 (ref 0.1–0.9)
MONOCYTES NFR BLD AUTO: 8.9 % (ref 5–12)
NEUTROPHILS NFR BLD AUTO: 3.62 10*3/MM3 (ref 1.7–7)
NEUTROPHILS NFR BLD AUTO: 60.9 % (ref 42.7–76)
PHOSPHATE SERPL-MCNC: 2.9 MG/DL (ref 2.5–4.5)
PLATELET # BLD AUTO: 204 10*3/MM3 (ref 140–450)
PMV BLD AUTO: 9.3 FL (ref 6–12)
POTASSIUM SERPL-SCNC: 4.5 MMOL/L (ref 3.5–5.2)
PROT SERPL-MCNC: 6.5 G/DL (ref 6–8.5)
PSA SERPL-MCNC: 0.02 NG/ML (ref 0–4)
RBC # BLD AUTO: 5.03 10*6/MM3 (ref 4.14–5.8)
SODIUM SERPL-SCNC: 141 MMOL/L (ref 136–145)
WBC NRBC COR # BLD AUTO: 5.94 10*3/MM3 (ref 3.4–10.8)

## 2025-03-28 PROCEDURE — 36415 COLL VENOUS BLD VENIPUNCTURE: CPT

## 2025-03-28 PROCEDURE — 84100 ASSAY OF PHOSPHORUS: CPT

## 2025-03-28 PROCEDURE — 80053 COMPREHEN METABOLIC PANEL: CPT

## 2025-03-28 PROCEDURE — 84153 ASSAY OF PSA TOTAL: CPT

## 2025-03-28 PROCEDURE — 83735 ASSAY OF MAGNESIUM: CPT

## 2025-03-28 PROCEDURE — 85025 COMPLETE CBC W/AUTO DIFF WBC: CPT

## 2025-03-31 ENCOUNTER — OFFICE VISIT (OUTPATIENT)
Dept: ONCOLOGY | Facility: CLINIC | Age: 75
End: 2025-03-31
Payer: MEDICARE

## 2025-03-31 ENCOUNTER — HOSPITAL ENCOUNTER (OUTPATIENT)
Dept: ONCOLOGY | Facility: HOSPITAL | Age: 75
Discharge: HOME | End: 2025-03-31
Payer: MEDICARE

## 2025-03-31 VITALS
WEIGHT: 213 LBS | OXYGEN SATURATION: 98 % | RESPIRATION RATE: 18 BRPM | SYSTOLIC BLOOD PRESSURE: 148 MMHG | HEART RATE: 70 BPM | TEMPERATURE: 97.8 F | HEIGHT: 68 IN | BODY MASS INDEX: 32.28 KG/M2 | DIASTOLIC BLOOD PRESSURE: 74 MMHG

## 2025-03-31 DIAGNOSIS — IMO0001 ANDROGEN DEPRIVATION THERAPY: ICD-10-CM

## 2025-03-31 DIAGNOSIS — C61 PROSTATE CANCER: Primary | ICD-10-CM

## 2025-03-31 DIAGNOSIS — C77.2 SECONDARY AND UNSPECIFIED MALIGNANT NEOPLASM OF INTRA-ABDOMINAL LYMPH NODES: ICD-10-CM

## 2025-03-31 PROCEDURE — G2211 COMPLEX E/M VISIT ADD ON: HCPCS | Performed by: INTERNAL MEDICINE

## 2025-03-31 PROCEDURE — 25010000002 ZOLEDRONIC ACID 4 MG/100ML SOLUTION: Performed by: INTERNAL MEDICINE

## 2025-03-31 PROCEDURE — 96365 THER/PROPH/DIAG IV INF INIT: CPT

## 2025-03-31 PROCEDURE — 99215 OFFICE O/P EST HI 40 MIN: CPT | Performed by: INTERNAL MEDICINE

## 2025-03-31 PROCEDURE — 25010000002 LEUPROLIDE (6 MONTH) PER 7.5 MG: Performed by: INTERNAL MEDICINE

## 2025-03-31 PROCEDURE — 1159F MED LIST DOCD IN RCRD: CPT | Performed by: INTERNAL MEDICINE

## 2025-03-31 PROCEDURE — 96402 CHEMO HORMON ANTINEOPL SQ/IM: CPT

## 2025-03-31 PROCEDURE — 96374 THER/PROPH/DIAG INJ IV PUSH: CPT

## 2025-03-31 PROCEDURE — 1126F AMNT PAIN NOTED NONE PRSNT: CPT | Performed by: INTERNAL MEDICINE

## 2025-03-31 PROCEDURE — 1160F RVW MEDS BY RX/DR IN RCRD: CPT | Performed by: INTERNAL MEDICINE

## 2025-03-31 RX ORDER — ZOLEDRONIC ACID 0.04 MG/ML
4 INJECTION, SOLUTION INTRAVENOUS ONCE
Status: COMPLETED | OUTPATIENT
Start: 2025-03-31 | End: 2025-03-31

## 2025-03-31 RX ORDER — SODIUM CHLORIDE 9 MG/ML
20 INJECTION, SOLUTION INTRAVENOUS ONCE
Status: CANCELLED | OUTPATIENT
Start: 2025-03-31

## 2025-03-31 RX ADMIN — LEUPROLIDE ACETATE 45 MG: 45 INJECTION, SUSPENSION, EXTENDED RELEASE SUBCUTANEOUS at 11:40

## 2025-03-31 RX ADMIN — ZOLEDRONIC ACID 4 MG: 0.04 INJECTION, SOLUTION INTRAVENOUS at 11:40

## 2025-03-31 NOTE — LETTER
March 31, 2025     Jacqueline Torres PA-C  [address]  [place] [address]  Formerly [facility] [address]    Patient: Jeb Russo   YOB: 1950   Date of Visit: 3/31/2025     Dear Jacqueline Torres PA-C:       Thank you for referring Jeb Russo to me for evaluation. Below are the relevant portions of my assessment and plan of care.    If you have questions, please do not hesitate to call me. I look forward to following Jeb along with you.         Sincerely,        Bright Gan MD        CC: MD Valentina Bah, MD Leora Esposito MD Clayton Lau, MD Alan C. Beckman, MD Collin Grossman, MD Anant Jaramillo, MD Gan, Bright AMADOR MD  03/31/25 1101  Sign when Signing Visit  CHIEF COMPLAINT: No new somatic complaints tolerating therapy well    Problem List:  Oncology/Hematology History Overview Note   1.  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  Salvage external beam radiation 6400 cGy in 32 fractions ending 11/12/2021.  Stereotactic radiotherapy to 2 focal positive metastatic sites 1 year later.  PSA 8 September 2023.  Lupron started 45 mg every 6 months.  September 2023 PSA still 8.  September 23 PET showed 3 small newly hypermetabolic foci left mid abdomen and pelvis corresponding to normal-sized lymph nodes on CT suspicious for metastatic disease and to previously noted hypermetabolic nodes July 2022 no longer identified.  January 2024 PSA 9.2 3/15/2024 Pylarify PET shows increase in size and uptake of retroperitoneal adenopathy consistent with metastatic disease.  3/18/2024 PSA 11.5.  3/18/2024 follow-up with Dr. Grossman medical oncology.  He recommended Erleada or Xtandi but no chemotherapy.  Plan molecular testing with next generation sequencing and genetic counseling.    -3/25/2024 [facility] medical oncology follow-up: The patient had seen my  partner Dr. Corcoran back in September and subsequently Dr. Otoole and in the meantime had seen Dr. Grossman at [facility].  I reviewed the above recommendations from Dr. Grossman and I confirmed with the patient that those are fine recommendations.  He also has the option of Zytiga prednisone which we discussed and I also discussed in addition the need for Zometa every 6 months to prevent osteoporosis.  He is willing to try what ever I recommend and I would lean towards Erleada.  We discussed the side effects in detail and he will have a cancer treatment preparation visit with our pharmacists.  I would not for the moment go after this with CyberKnife unless he developed refractory symptomatic spots of disease.  I would not do next generation sequencing at this junction on his tissue from about 10 years ago but would procure tissue when he gets to the hormone refractory phase of his treatment and would send serum as well as tissue NexGen sequencing at that point.  We will make referrals to genetic testing which is standard for all patients with metastatic prostate cancer and may afford targets for therapy in the future as well.  He understands the above plan and is willing to proceed.  He will get his second Lupron shot today.He will see my nurse practitioner monthly with blood count chemistry and PSA and make sure he is tolerating this and in 3 months we will repeat the PSMA PET.  If we are making the progress that we need to see then perhaps we can back off on the frequency of the labs and the visits but I want to make sure he tolerates this well as we do have lateral hormonal blockade options.    -3/25/2024 received Lupron and Zometa  -4/1/2024 chemotherapy education preparation and needs assessment completed    -4/5/2024 began apalutamide    -4/17/2024  Mr. Russo's genetic test was negative for pathogenic mutations in all 49 - cancer risk genes analyzed.  Mr. 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  Per the [employer] College [employer] Genetics (ACMG) guidelines, this VUS is not to be used in clinical decision-making, and is to be considered a negative result until further evidence supports reclassification of this variant.     -5/9/2024 PSA 0.188  -5/10/2024 [facility] follow-up: He is doing well on current therapy with apalutamide, Lupron and Zometa.  Clinically seems to be having an excellent response thus far with a drop in his PSA now down to 0.188.  He is having no significant side effects from his treatment.  He did have some bone pain and fatigue for a few days after Zometa but that resolved.  Physically he is feeling quite well.  He will continue treatment unchanged.  We will see him back in 4 weeks for follow-up with repeat labs on return.  He is anxious to make a trip back home however I will have him wait and discussed with Dr. Gan plans for ongoing therapy and scans to assess for response prior to any travels.    -5/17/2024 [facility] follow-up: He returns today for logistical discussions.  See my March note for overall plan but the following I think is a reasonable plan.  He will see my note nurse practitioner July 5 for labs including PSA which is going down nicely and at that point she will set him up for a PSMA PET the middle of July.  If that looks good and the PSA is coming down nicely then I think before his Lupron and Zometa the end of September would be fine for him to go home to [facility] with a CBC CMP and PSA towards the end of August in [facility] which he will get the information to us.  He is pleased with how he is feeling on the apalutamide Lupron and Zometa.  He understands the plan for indefinite salvage hormone blockade for hormone sensitive prostate cancer with a HOXB13 VUS.    -6/26/2024 CT chest, abdomen and pelvis shows a few sclerotic foci within thoracic vertebrae which are nonspecific.  These appear unchanged  compared to 2021 study which may represent bone islands versus remote sclerotic mets.  Chronic changes within the lung with no suspicious nodules.  Abdomen and pelvis shows previously noted hypermetabolic lymph nodes decreased in size from comparison compatible with a response to therapy.  No evidence of progression or new disease.  Postsurgical changes from prior prostatectomy.  -PSA 0.086    -7/1/2024 [facility] Oncology clinic follow-up: Continues to do well on current therapy with apalutamide, Lupron and Zometa.  He is tolerating well with no unusual side effects.  His PSA continues to decline, current PSA 0.086.  CT chest, abdomen and pelvis show good response with previously noted hypermetabolic lymph nodes decreased in size from comparison compatible with response to therapy.  No evidence of progression or new disease.  He will continue treatment unchanged, he will be due for Lupron and Zometa in late September and we will see him back at that time.    -9/25/2024 PSA 0.057.CBC and CMP unremarkable.    -9/30/2024 [facility] oncology clinic follow-up: PSA doing wonderfully and he feels great.  He will get a PSA and MRI on in December and get back with us and assuming that that is stable or lower then I will get another PSA and a CT and bone scan and March just before his next Lupron and Zometa after today.  He will continue on his apalutamide indefinitely.  Does occasionally have dizziness but it is more vertiginous when he arises and not dizzy in general and is not often.  Otherwise no complaints.    -12/24/2024 PSA 0.03    -3/26/2025 CT chest abdomen pelvis with contrast compared to June 2024 shows continued interval decrease in the size of retroperitoneal nodes with no new or worrisome lymph nodes and no bony disease.  Bone scan without evidence of metastasis.    -3/28/2025 PSA 0.016.  Glucose 106 otherwise unremarkable CMP and CBC.    -3/31/2025 [facility] oncology clinic follow-up: Tolerating Lupron and apalutamide  Zometa.  PSA continues to decline even lower in the nodes continue to respond.  Will see him back in 6 months for next Lupron after today and repeat CT abdomen bone scan in a year from now.     Prostate cancer   2/4/2015 Cancer Staged    Staging form: Prostate, AJCC 8th Edition  - Pathologic stage from 2/4/2015: Stage BENNIE (pT2, pN1, cM0, PSA: 5, Grade Group: 2) - Signed by Niraj Otoole MD on 9/20/2021 9/20/2021 Initial Diagnosis    Prostate cancer (CMS/HCC)     9/30/2021 - 11/12/2021 Radiation    Radiation OncologyTreatment Course:  Jeb Russo received 6400 cGy in 32 fractions to prostate gland via External Beam Radiation - EBRT.     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         HISTORY OF PRESENT ILLNESS:  The patient is a 74 y.o. male, here for follow up on management of stage Bennie prostate cancer tolerating therapy with Lupron and apalutamide Zometa    Past Medical History:   Diagnosis Date   • Cancer     prostate   • Coronary artery disease    • Encounter for pacemaker at end of battery life 04/02/2021    Added automatically from request for surgery [id number]   • Prostate cancer      Past Surgical History:   Procedure Laterality Date   • ANKLE SURGERY     • CARDIAC ELECTROPHYSIOLOGY PROCEDURE N/A 04/05/2021    Procedure: PPM generator change - dual  [employer];  Surgeon: Valentina Saini MD;  Location: [facility] INVASIVE LOCATION;  Service:  "Cardiology;  Laterality: N/A;   • CARDIAC ELECTROPHYSIOLOGY PROCEDURE N/A 11/01/2021    Procedure: Pocket Revision to submuscular, no meds to hold;  Surgeon: Anant Jaramillo MD;  Location: [facility] INVASIVE LOCATION;  Service: Cardiology;  Laterality: N/A;   • COLONOSCOPY     • CYBERKNIFE  08/12/2022    Metastatic left iliac LN, left periaortic LN   • INSERT / REPLACE / REMOVE PACEMAKER  2009   • KNEE ACL RECONSTRUCTION     • KNEE ACL RECONSTRUCTION     • PROSTATE SURGERY  2015       No Known Allergies    Family History and Social History reviewed and changed as necessary    REVIEW OF SYSTEM:   No new somatic complaints    PHYSICAL EXAM:  No jaundice icterus or pallor.  No respiratory distress.  No rashes    Vitals:    03/31/25 1004   BP: 148/74   Pulse: 70   Resp: 18   Temp: 97.8 °F (36.6 °C)   SpO2: 98%   Weight: 96.6 kg (213 lb)   Height: 171.5 cm (67.5\")     Vitals:    03/31/25 1004   PainSc: 0-No pain          ECOG score: 1           Vitals reviewed.    ECOG: (1) Restricted in Physically Strenuous Activity, Ambulatory & Able to Do Work of Light Nature    Lab Results   Component Value Date    HGB 14.9 03/28/2025    HCT 45.8 03/28/2025    MCV 91.1 03/28/2025     03/28/2025    WBC 5.94 03/28/2025    NEUTROABS 3.62 03/28/2025    LYMPHSABS 1.56 03/28/2025    MONOSABS 0.53 03/28/2025    EOSABS 0.16 03/28/2025    BASOSABS 0.03 03/28/2025       Lab Results   Component Value Date    GLUCOSE 106 (H) 03/28/2025    BUN 11 03/28/2025    CREATININE 0.87 03/28/2025     03/28/2025    K 4.5 03/28/2025     03/28/2025    CO2 26.0 03/28/2025    CALCIUM 9.8 03/28/2025    PROTEINTOT 6.5 03/28/2025    ALBUMIN 3.9 03/28/2025    BILITOT 0.2 03/28/2025    ALKPHOS 70 03/28/2025    AST 20 03/28/2025    ALT 16 03/28/2025             ASSESSMENT & PLAN:  1.  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  " Salvage external beam radiation 6400 cGy in 32 fractions ending 11/12/2021.  Stereotactic radiotherapy to 2 focal positive metastatic sites 1 year later.  PSA 8 September 2023.  Lupron started 45 mg every 6 months.  September 2023 PSA still 8.  September 23 PET showed 3 small newly hypermetabolic foci left mid abdomen and pelvis corresponding to normal-sized lymph nodes on CT suspicious for metastatic disease and to previously noted hypermetabolic nodes July 2022 no longer identified.  January 2024 PSA 9.2 3/15/2024 Pylarify PET shows increase in size and uptake of retroperitoneal adenopathy consistent with metastatic disease.  3/18/2024 PSA 11.5.  3/18/2024 follow-up with Dr. Grossman medical oncology.  He recommended Erleada or Xtandi but no chemotherapy.  Plan molecular testing with next generation sequencing and genetic counseling.    -3/25/2024 [facility] medical oncology follow-up: The patient had seen my partner Dr. Corcoran back in September and subsequently Dr. Otoole and in the meantime had seen Dr. Grossman at [facility].  I reviewed the above recommendations from Dr. Grossman and I confirmed with the patient that those are fine recommendations.  He also has the option of Zytiga prednisone which we discussed and I also discussed in addition the need for Zometa every 6 months to prevent osteoporosis.  He is willing to try what ever I recommend and I would lean towards Erleada.  We discussed the side effects in detail and he will have a cancer treatment preparation visit with our pharmacists.  I would not for the moment go after this with CyberKnife unless he developed refractory symptomatic spots of disease.  I would not do next generation sequencing at this junction on his tissue from about 10 years ago but would procure tissue when he gets to the hormone refractory phase of his treatment and would send serum as well as tissue NexGen sequencing at that point.  We will make referrals to genetic testing which is  standard for all patients with metastatic prostate cancer and may afford targets for therapy in the future as well.  He understands the above plan and is willing to proceed.  He will get his second Lupron shot today.He will see my nurse practitioner monthly with blood count chemistry and PSA and make sure he is tolerating this and in 3 months we will repeat the PSMA PET.  If we are making the progress that we need to see then perhaps we can back off on the frequency of the labs and the visits but I want to make sure he tolerates this well as we do have lateral hormonal blockade options.    -3/25/2024 received Lupron and Zometa  -4/1/2024 chemotherapy education preparation and needs assessment completed    -4/5/2024 began apalutamide    -4/17/2024  Mr. Russo's genetic test was negative for pathogenic mutations in all 49 - cancer risk genes analyzed.  Mr. 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  Per the American College of Medical Genetics (ACMG) guidelines, this VUS is not to be used in clinical decision-making, and is to be considered a negative result until further evidence supports reclassification of this variant.     -5/9/2024 PSA 0.188  -5/10/2024 [facility] follow-up: He is doing well on current therapy with apalutamide, Lupron and Zometa.  Clinically seems to be having an excellent response thus far with a drop in his PSA now down to 0.188.  He is having no significant side effects from his treatment.  He did have some bone pain and fatigue for a few days after Zometa but that resolved.  Physically he is feeling quite well.  He will continue treatment unchanged.  We will see him back in 4 weeks for follow-up with repeat labs on return.  He is anxious to make a trip back home however I will have him wait and discussed with Dr. Gan plans for ongoing therapy and scans to assess for response prior to any travels.    -5/17/2024 [facility]  on[facility] follow-up: He returns today for logistical discussions.  See my March note for overall plan but the following I think is a reasonable plan.  He will see my note nurse practitioner July 5 for labs including PSA which is going down nicely and at that point she will set him up for a PSMA PET the middle of July.  If that looks good and the PSA is coming down nicely then I think before his Lupron and Zometa the end of September would be fine for him to go home to [facility] with a CBC CMP and PSA towards the end of August in [facility] which he will get the information to us.  He is pleased with how he is feeling on the apalutamide Lupron and Zometa.  He understands the plan for indefinite salvage hormone blockade for hormone sensitive prostate cancer with a HOXB13 VUS.    -6/26/2024 CT chest, abdomen and pelvis shows a few sclerotic foci within thoracic vertebrae which are nonspecific.  These appear unchanged compared to 2021 study which may represent bone islands versus remote sclerotic mets.  Chronic changes within the lung with no suspicious nodules.  Abdomen and pelvis shows previously noted hypermetabolic lymph nodes decreased in size from comparison compatible with a response to therapy.  No evidence of progression or new disease.  Postsurgical changes from prior prostatectomy.  -PSA 0.086    -7/1/2024 [facility] follow-up: Continues to do well on current therapy with apalutamide, Lupron and Zometa.  He is tolerating well with no unusual side effects.  His PSA continues to decline, current PSA 0.086.  CT chest, abdomen and pelvis show good response with previously noted hypermetabolic lymph nodes decreased in size from comparison compatible with response to therapy.  No evidence of progression or new disease.  He will continue treatment unchanged, he will be due for Lupron and Zometa in late September and we will see him back at that time.    -9/25/2024 PSA 0.057.CBC and CMP unremarkable.    -9/30/2024  [facility] oncology clinic follow-up: PSA doing wonderfully and he feels great.  He will get a PSA and MRI on in December and get back with us and assuming that that is stable or lower then I will get another PSA and a CT and bone scan and March just before his next Lupron and Zometa after today.  He will continue on his apalutamide indefinitely.  Does occasionally have dizziness but it is more vertiginous when he arises and not dizzy in general and is not often.  Otherwise no complaints.    -12/24/2024 PSA 0.03    -3/26/2025 CT chest abdomen pelvis with contrast compared to June 2024 shows continued interval decrease in the size of retroperitoneal nodes with no new or worrisome lymph nodes and no bony disease.  Bone scan without evidence of metastasis.    -3/28/2025 PSA 0.016.  Glucose 106 otherwise unremarkable CMP and CBC.    -3/31/2025 [facility] oncology clinic follow-up: Tolerating Lupron and apalutamide Zometa.  PSA continues to decline even lower in the nodes continue to respond.  Will see him back in 6 months for next Lupron after today and repeat CT abdomen bone scan in a year from now.    Total time of care today inclusive of time spent today prior to patient's arrival reviewing interval data and during visit translating patient putting forth plan as outlined after visit instituting this plan took 50 minutes patient care time throughout the day today.  Bright Gan MD    03/31/2025

## 2025-03-31 NOTE — PROGRESS NOTES
CHIEF COMPLAINT: No new somatic complaints tolerating therapy well    Problem List:  Oncology/Hematology History Overview Note   1.  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  Salvage external beam radiation 6400 cGy in 32 fractions ending 11/12/2021.  Stereotactic radiotherapy to 2 focal positive metastatic sites 1 year later.  PSA 8 September 2023.  Lupron started 45 mg every 6 months.  September 2023 PSA still 8.  September 23 PET showed 3 small newly hypermetabolic foci left mid abdomen and pelvis corresponding to normal-sized lymph nodes on CT suspicious for metastatic disease and to previously noted hypermetabolic nodes July 2022 no longer identified.  January 2024 PSA 9.2 3/15/2024 Pylarify PET shows increase in size and uptake of retroperitoneal adenopathy consistent with metastatic disease.  3/18/2024 PSA 11.5.  3/18/2024 follow-up with Dr. Grossman medical oncology.  He recommended Erleada or Xtandi but no chemotherapy.  Plan molecular testing with next generation sequencing and genetic counseling.    -3/25/2024 [facility] oncology follow-up: The patient had seen my partner Dr. Corcoran back in September and subsequently Dr. Otoole and in the meantime had seen Dr. Grossman at [facility].  I reviewed the above recommendations from Dr. Grossman and I confirmed with the patient that those are fine recommendations.  He also has the option of Zytiga prednisone which we discussed and I also discussed in addition the need for Zometa every 6 months to prevent osteoporosis.  He is willing to try what ever I recommend and I would lean towards Erleada.  We discussed the side effects in detail and he will have a cancer treatment preparation visit with our pharmacists.  I would not for the moment go after this with CyberKnife unless he developed refractory symptomatic spots of disease.  I would not do next  generation sequencing at this junction on his tissue from about 10 years ago but would procure tissue when he gets to the hormone refractory phase of his treatment and would send serum as well as tissue NexGen sequencing at that point.  We will make referrals to genetic testing which is standard for all patients with metastatic prostate cancer and may afford targets for therapy in the future as well.  He understands the above plan and is willing to proceed.  He will get his second Lupron shot today.He will see my nurse practitioner monthly with blood count chemistry and PSA and make sure he is tolerating this and in 3 months we will repeat the PSMA PET.  If we are making the progress that we need to see then perhaps we can back off on the frequency of the labs and the visits but I want to make sure he tolerates this well as we do have lateral hormonal blockade options.    -3/25/2024 received Lupron and Zometa  -4/1/2024 chemotherapy education preparation and needs assessment completed    -4/5/2024 began apalutamide    -4/17/2024  Mr. Russo's genetic test was negative for pathogenic mutations in all 49 - cancer risk genes analyzed.  Mr. 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  Per the American College of Medical Genetics (ACMG) guidelines, this VUS is not to be used in clinical decision-making, and is to be considered a negative result until further evidence supports reclassification of this variant.     -5/9/2024 PSA 0.188  -5/10/2024 [facility] Oncology clinic follow-up: He is doing well on current therapy with apalutamide, Lupron and Zometa.  Clinically seems to be having an excellent response thus far with a drop in his PSA now down to 0.188.  He is having no significant side effects from his treatment.  He did have some bone pain and fatigue for a few days after Zometa but that resolved.  Physically he is feeling quite well.  He will continue treatment  unchanged.  We will see him back in 4 weeks for follow-up with repeat labs on return.  He is anxious to make a trip back home however I will have him wait and discussed with Dr. Gan plans for ongoing therapy and scans to assess for response prior to any travels.    -5/17/2024 [facility] follow-[facility]: He returns today for logistical discussions.  See my March note for overall plan but the following I think is a reasonable plan.  He will see my note nurse practitioner July 5 for labs including PSA which is going down nicely and at that point she will set him up for a PSMA PET the middle of July.  If that looks good and the PSA is coming down nicely then I think before his Lupron and Zometa the end of September would be fine for him to go home to [facility] with a CBC CMP and PSA towards the end of August in [facility] which he will get the information to us.  He is pleased with how he is feeling on the apalutamide Lupron and Zometa.  He understands the plan for indefinite salvage hormone blockade for hormone sensitive prostate cancer with a HOXB13 VUS.    -6/26/2024 CT chest, abdomen and pelvis shows a few sclerotic foci within thoracic vertebrae which are nonspecific.  These appear unchanged compared to 2021 study which may represent bone islands versus remote sclerotic mets.  Chronic changes within the lung with no suspicious nodules.  Abdomen and pelvis shows previously noted hypermetabolic lymph nodes decreased in size from comparison compatible with a response to therapy.  No evidence of progression or new disease.  Postsurgical changes from prior prostatectomy.  -PSA 0.086    -7/1/2024 [facility] follow-up: Continues to do well on current therapy with apalutamide, Lupron and Zometa.  He is tolerating well with no unusual side effects.  His PSA continues to decline, current PSA 0.086.  CT chest, abdomen and pelvis show good response with previously noted hypermetabolic lymph nodes decreased in size from  comparison compatible with response to therapy.  No evidence of progression or new disease.  He will continue treatment unchanged, he will be due for Lupron and Zometa in late September and we will see him back at that time.    -9/25/2024 PSA 0.057.CBC and CMP unremarkable.    -9/30/2024 [facility] clinic follow-up: PSA doing wonderfully and he feels great.  He will get a PSA and MRI on in December and get back with us and assuming that that is stable or lower then I will get another PSA and a CT and bone scan and March just before his next Lupron and Zometa after today.  He will continue on his apalutamide indefinitely.  Does occasionally have dizziness but it is more vertiginous when he arises and not dizzy in general and is not often.  Otherwise no complaints.    -12/24/2024 PSA 0.03    -3/26/2025 CT chest abdomen pelvis with contrast compared to June 2024 shows continued interval decrease in the size of retroperitoneal nodes with no new or worrisome lymph nodes and no bony disease.  Bone scan without evidence of metastasis.    -3/28/2025 PSA 0.016.  Glucose 106 otherwise unremarkable CMP and CBC.    -3/31/2025 [facility] clinic follow-up: Tolerating Lupron and apalutamide Zometa.  PSA continues to decline even lower in the nodes continue to respond.  Will see him back in 6 months for next Lupron after today and repeat CT abdomen bone scan in a year from now.     Prostate cancer   2/4/2015 Cancer Staged    Staging form: Prostate, AJCC 8th Edition  - Pathologic stage from 2/4/2015: Stage BENNIE (pT2, pN1, cM0, PSA: 5, Grade Group: 2) - Signed by Niraj Otoole MD on 9/20/2021 9/20/2021 Initial Diagnosis    Prostate cancer (CMS/HCC)     9/30/2021 - 11/12/2021 Radiation    Radiation OncologyTreatment Course:  Jeb Russo received 6400 cGy in 32 fractions to prostate gland via External Beam Radiation - EBRT.     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         HISTORY OF PRESENT ILLNESS:  The patient is a 74 y.o. male, here for follow up on management of stage Amy prostate cancer tolerating therapy with Lupron and apalutamide Zometa    Past Medical History:   Diagnosis Date    Cancer     prostate    Coronary artery disease     Encounter for pacemaker at end of battery life 04/02/2021    Added automatically from request for surgery [id number]    Prostate cancer      Past Surgical History:   Procedure Laterality Date    ANKLE SURGERY      CARDIAC ELECTROPHYSIOLOGY PROCEDURE N/A 04/05/2021    Procedure: PPM generator change - dual  [employer];  Surgeon: Valentina Saini MD;  Location:  [facility];  Service: Cardiology;  Laterality: N/A;    CARDIAC ELECTROPHYSIOLOGY PROCEDURE N/A 11/01/2021    Procedure: Pocket Revision to submuscular, no meds to hold;  Surgeon: Anant Jaramillo MD;  Location:  [facility];  Service: Cardiology;  Laterality: N/A;    COLONOSCOPY      CYBERKNIFE  08/12/2022    Metastatic left iliac LN, left periaortic LN    INSERT / REPLACE / REMOVE PACEMAKER  2009    KNEE ACL RECONSTRUCTION      KNEE ACL RECONSTRUCTION      PROSTATE SURGERY  2015       No Known Allergies    Family History and Social History reviewed and changed as necessary    REVIEW OF SYSTEM:   No new somatic complaints    PHYSICAL EXAM:  No jaundice icterus or pallor.  No respiratory distress.  No rashes    Vitals:    03/31/25 1004   BP:  "148/74   Pulse: 70   Resp: 18   Temp: 97.8 °F (36.6 °C)   SpO2: 98%   Weight: 96.6 kg (213 lb)   Height: 171.5 cm (67.5\")     Vitals:    03/31/25 1004   PainSc: 0-No pain          ECOG score: 1           Vitals reviewed.    ECOG: (1) Restricted in Physically Strenuous Activity, Ambulatory & Able to Do Work of Light Nature    Lab Results   Component Value Date    HGB 14.9 03/28/2025    HCT 45.8 03/28/2025    MCV 91.1 03/28/2025     03/28/2025    WBC 5.94 03/28/2025    NEUTROABS 3.62 03/28/2025    LYMPHSABS 1.56 03/28/2025    MONOSABS 0.53 03/28/2025    EOSABS 0.16 03/28/2025    BASOSABS 0.03 03/28/2025       Lab Results   Component Value Date    GLUCOSE 106 (H) 03/28/2025    BUN 11 03/28/2025    CREATININE 0.87 03/28/2025     03/28/2025    K 4.5 03/28/2025     03/28/2025    CO2 26.0 03/28/2025    CALCIUM 9.8 03/28/2025    PROTEINTOT 6.5 03/28/2025    ALBUMIN 3.9 03/28/2025    BILITOT 0.2 03/28/2025    ALKPHOS 70 03/28/2025    AST 20 03/28/2025    ALT 16 03/28/2025             ASSESSMENT & PLAN:  1.  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  Salvage external beam radiation 6400 cGy in 32 fractions ending 11/12/2021.  Stereotactic radiotherapy to 2 focal positive metastatic sites 1 year later.  PSA 8 September 2023.  Lupron started 45 mg every 6 months.  September 2023 PSA still 8.  September 23 PET showed 3 small newly hypermetabolic foci left mid abdomen and pelvis corresponding to normal-sized lymph nodes on CT suspicious for metastatic disease and to previously noted hypermetabolic nodes July 2022 no longer identified.  January 2024 PSA 9.2 3/15/2024 Pylarify PET shows increase in size and uptake of retroperitoneal adenopathy consistent with metastatic disease.  3/18/2024 PSA 11.5.  3/18/2024 follow-up with Dr. Grossman medical oncology.  He recommended Erleada or Xtandi but no " chemotherapy.  Plan molecular testing with next generation sequencing and genetic counseling.    -3/25/2024 [facility] medical oncology follow-up: The patient had seen my partner Dr. Corcoran back in September and subsequently Dr. Otoole and in the meantime had seen Dr. Grossman at [facility].  I reviewed the above recommendations from Dr. Grossman and I confirmed with the patient that those are fine recommendations.  He also has the option of Zytiga prednisone which we discussed and I also discussed in addition the need for Zometa every 6 months to prevent osteoporosis.  He is willing to try what ever I recommend and I would lean towards Erleada.  We discussed the side effects in detail and he will have a cancer treatment preparation visit with our pharmacists.  I would not for the moment go after this with CyberKnife unless he developed refractory symptomatic spots of disease.  I would not do next generation sequencing at this junction on his tissue from about 10 years ago but would procure tissue when he gets to the hormone refractory phase of his treatment and would send serum as well as tissue NexGen sequencing at that point.  We will make referrals to genetic testing which is standard for all patients with metastatic prostate cancer and may afford targets for therapy in the future as well.  He understands the above plan and is willing to proceed.  He will get his second Lupron shot today.He will see my nurse practitioner monthly with blood count chemistry and PSA and make sure he is tolerating this and in 3 months we will repeat the PSMA PET.  If we are making the progress that we need to see then perhaps we can back off on the frequency of the labs and the visits but I want to make sure he tolerates this well as we do have lateral hormonal blockade options.    -3/25/2024 received Lupron and Zometa  -4/1/2024 chemotherapy education preparation and needs assessment completed    -4/5/2024 began apalutamide    -4/17/2024   Mr. Russo's genetic test was negative for pathogenic mutations in all 49 - cancer risk genes analyzed.  Mr. 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  Per the American College of Medical Genetics (ACMG) guidelines, this VUS is not to be used in clinical decision-making, and is to be considered a negative result until further evidence supports reclassification of this variant.     -5/9/2024 PSA 0.188  -5/10/2024 [facility] follow-up: He is doing well on current therapy with apalutamide, Lupron and Zometa.  Clinically seems to be having an excellent response thus far with a drop in his PSA now down to 0.188.  He is having no significant side effects from his treatment.  He did have some bone pain and fatigue for a few days after Zometa but that resolved.  Physically he is feeling quite well.  He will continue treatment unchanged.  We will see him back in 4 weeks for follow-up with repeat labs on return.  He is anxious to make a trip back home however I will have him wait and discussed with Dr. Gan plans for ongoing therapy and scans to assess for response prior to any travels.    -5/17/2024 [facility] follow-up: He returns today for logistical discussions.  See my March note for overall plan but the following I think is a reasonable plan.  He will see my note nurse practitioner July 5 for labs including PSA which is going down nicely and at that point she will set him up for a PSMA PET the middle of July.  If that looks good and the PSA is coming down nicely then I think before his Lupron and Zometa the end of September would be fine for him to go home to [facility] with a CBC CMP and PSA towards the end of August in [place] which he will get the information to us.  He is pleased with how he is feeling on the apalutamide Lupron and Zometa.  He understands the plan for indefinite salvage hormone blockade for hormone sensitive prostate cancer with a  HOXB13 VUS.    -6/26/2024 CT chest, abdomen and pelvis shows a few sclerotic foci within thoracic vertebrae which are nonspecific.  These appear unchanged compared to 2021 study which may represent bone islands versus remote sclerotic mets.  Chronic changes within the lung with no suspicious nodules.  Abdomen and pelvis shows previously noted hypermetabolic lymph nodes decreased in size from comparison compatible with a response to therapy.  No evidence of progression or new disease.  Postsurgical changes from prior prostatectomy.  -PSA 0.086    -7/1/2024 [facility] Oncology clinic follow-up: Continues to do well on current therapy with apalutamide, Lupron and Zometa.  He is tolerating well with no unusual side effects.  His PSA continues to decline, current PSA 0.086.  CT chest, abdomen and pelvis show good response with previously noted hypermetabolic lymph nodes decreased in size from comparison compatible with response to therapy.  No evidence of progression or new disease.  He will continue treatment unchanged, he will be due for Lupron and Zometa in late September and we will see him back at that time.    -9/25/2024 PSA 0.057.CBC and CMP unremarkable.    -9/30/2024 [facility] oncology clinic follow-up: PSA doing wonderfully and he feels great.  He will get a PSA and MRI on in December and get back with us and assuming that that is stable or lower then I will get another PSA and a CT and bone scan and March just before his next Lupron and Zometa after today.  He will continue on his apalutamide indefinitely.  Does occasionally have dizziness but it is more vertiginous when he arises and not dizzy in general and is not often.  Otherwise no complaints.    -12/24/2024 PSA 0.03    -3/26/2025 CT chest abdomen pelvis with contrast compared to June 2024 shows continued interval decrease in the size of retroperitoneal nodes with no new or worrisome lymph nodes and no bony disease.  Bone scan without evidence of  metastasis.    -3/28/2025 PSA 0.016.  Glucose 106 otherwise unremarkable CMP and CBC.    -3/31/2025 [facility] clinic follow-up: Tolerating Lupron and apalutamide Zometa.  PSA continues to decline even lower in the nodes continue to respond.  Will see him back in 6 months for next Lupron after today and repeat CT abdomen bone scan in a year from now.    Total time of care today inclusive of time spent today prior to patient's arrival reviewing interval data and during visit translating patient putting forth plan as outlined after visit instituting this plan took 50 minutes patient care time throughout the day today.  Bright Gan MD    03/31/2025       actual

## 2025-04-02 ENCOUNTER — SPECIALTY PHARMACY (OUTPATIENT)
Dept: ONCOLOGY | Facility: HOSPITAL | Age: 75
End: 2025-04-02
Payer: MEDICARE

## 2025-04-02 ENCOUNTER — OFFICE VISIT (OUTPATIENT)
Dept: CARDIOLOGY | Facility: CLINIC | Age: 75
End: 2025-04-02
Payer: MEDICARE

## 2025-04-02 VITALS
HEIGHT: 68 IN | SYSTOLIC BLOOD PRESSURE: 132 MMHG | WEIGHT: 214.6 LBS | OXYGEN SATURATION: 97 % | BODY MASS INDEX: 32.52 KG/M2 | DIASTOLIC BLOOD PRESSURE: 56 MMHG | HEART RATE: 62 BPM

## 2025-04-02 DIAGNOSIS — I49.5 SICK SINUS SYNDROME: Primary | ICD-10-CM

## 2025-04-02 DIAGNOSIS — I44.30 AVB (ATRIOVENTRICULAR BLOCK): ICD-10-CM

## 2025-04-02 DIAGNOSIS — T82.897S EROSION OF PACEMAKER POCKET DUE TO AND NOT CONCURRENT WITH IMPLANTATION OF CARDIAC PACEMAKER: ICD-10-CM

## 2025-04-02 NOTE — PROGRESS NOTES
Jeb Russo  1950  [phone]      04/02/2025      [facility] CARDIOLOGY     Blues, Jacqueline RODRIGUES, PARadhaC  [address]  [place] [id number]    Chief Complaint   Patient presents with    Sick sinus syndrome       Problem List:  Sick Sinus Rhythm  Episode of Syncope 2006  Subsequent extensive cardiac evaluation including negative cardiac catheterization study.   [facility] dual-chamber pacemaker implant in 2006  PM generator change by Dr. Saini, 4/2012  PM generator change by Dr. Saini, 4/5/2021  Revision of pacemaker generator November 1, 2021 secondary to pocket erosion, discomfort. Dr. Jaramillo   Echocardiogram 9/20/2023 normal LV size and function, trace MR, trace TR, mild LVH  Dyslipidemia, not on any treatment.  Prostate cancer, s/p prostatectomy  Obesity, BMI 30  Surgical history:   Prostatectomy   Knee surgery/ ACL repair (remote past)    Allergies  No Known Allergies    Current Medications    Current Outpatient Medications:     Apalutamide (Erleada) 240 MG tablet, TAKE 1 TABLET BY MOUTH DAILY  WITH OR WITHOUT FOOD, Disp: 30 tablet, Rfl: 11    B Complex Vitamins (VITAMIN B COMPLEX PO), Take  by mouth Every Other Day., Disp: , Rfl:     Calcium Citrate-Vitamin D (Calcium + D) 315-5 MG-MCG per tablet, Take 1 tablet by mouth Daily., Disp: , Rfl:     COLLAGEN PO, Take  by mouth., Disp: , Rfl:     Multiple Vitamins-Minerals (MULTIVITAMIN ADULT PO), Take 15 mg by mouth Daily., Disp: , Rfl:     ondansetron (ZOFRAN) 8 MG tablet, Take 1 tablet by mouth 3 (Three) Times a Day As Needed for Nausea or Vomiting., Disp: 30 tablet, Rfl: 5    vitamin D3 125 MCG (5000 UT) capsule capsule, Take 1 capsule by mouth Daily., Disp: , Rfl:     Zinc 30 MG tablet, Take 1 tablet by mouth Daily., Disp: , Rfl:     History of Present Illness     Pt presents for follow up of SSS .Since the pt has seen us in clinic last, pt denies any syncope, SOB, CP, LH, and dizziness. Denies any hospitalizations, ER  "visits, bleeding, or TIA/CVA symptoms. Overall feels well.  Blood pressure been well-controlled at home.    Vitals:    04/02/25 1001   BP: 132/56   BP Location: Right arm   Patient Position: Sitting   Cuff Size: Adult   Pulse: 62   SpO2: 97%   Weight: 97.3 kg (214 lb 9.6 oz)   Height: 171.5 cm (67.52\")       PE:  General: NAD  Neck: no JVD, no carotid bruits, no TM  Heart: RRR, NL S1, S2, S4 present, no rubs, murmurs  Lungs: CTA, no wheezes, rhonchi, or rales  Abd: soft, non-tender, NL BS  Ext: No musculoskeletal deformities, no edema, cyanosis, or clubbing  Psych: normal mood and affect    Diagnostic Data:    ECG 12 Lead    Date/Time: 4/2/2025 10:25 AM  Performed by: Anant Jaramillo MD    Authorized by: Anant Jaramillo MD  Comparison: compared with previous ECG from 9/12/2023  Similar to previous ECG  Rhythm: sinus rhythm and paced  BPM: 60          Interrogation of device demonstrates normal DDD pacemaker.  93% paced in the atrium.  100% the ventricle.  Battery voltage 5.5 years.  No significant arrhythmias.    Advance Care Planning   ACP discussion was declined by the patient. Patient does not have an advance directive, declines further assistance.       1. Sick sinus syndrome    2. AVB (atrioventricular block)    3. Erosion of pacemaker pocket due to and not concurrent with implantation of cardiac pacemaker            Plan:    Sick sinus syndrome/AV block status post DDD pacemaker implantation in the past with normal pacemaker function.  Erosion of pacemaker pocket subsequent resolution after revision.    F/up in 12 months      "

## 2025-05-14 ENCOUNTER — TELEPHONE (OUTPATIENT)
Dept: CARDIOLOGY | Facility: CLINIC | Age: 75
End: 2025-05-14
Payer: MEDICARE

## 2025-05-14 NOTE — TELEPHONE ENCOUNTER
Called patient's sister regarding Mr. Russo's bedside home monitor. She informed me that Mr. Russo is visiting [place] and his monitor wasn't delivered in time for him to take it with him. He will be back in [place] in October. She said she will let us know when we returns.

## (undated) DEVICE — DRSNG SURG AQUACEL AG 9X15CM

## (undated) DEVICE — SKIN AFFIX SURG ADHESIVE 72/CS 0.55ML: Brand: MEDLINE

## (undated) DEVICE — SUT VIC 2/0 CT1 27IN J259H

## (undated) DEVICE — LEX CATH LAB MINOR: Brand: MEDLINE INDUSTRIES, INC.

## (undated) DEVICE — SUT VIC FS2 4/0 27IN J422H

## (undated) DEVICE — 3M™ IOBAN™ 2 ANTIMICROBIAL INCISE DRAPE 6650EZ: Brand: IOBAN™ 2